# Patient Record
Sex: FEMALE | Race: WHITE | NOT HISPANIC OR LATINO | Employment: FULL TIME | URBAN - METROPOLITAN AREA
[De-identification: names, ages, dates, MRNs, and addresses within clinical notes are randomized per-mention and may not be internally consistent; named-entity substitution may affect disease eponyms.]

---

## 2018-05-22 ENCOUNTER — HOSPITAL ENCOUNTER (EMERGENCY)
Facility: HOSPITAL | Age: 58
Discharge: HOME | End: 2018-05-22
Attending: EMERGENCY MEDICINE | Admitting: EMERGENCY MEDICINE

## 2018-05-22 ENCOUNTER — APPOINTMENT (OUTPATIENT)
Dept: GENERAL RADIOLOGY | Facility: HOSPITAL | Age: 58
End: 2018-05-22

## 2018-05-22 VITALS
BODY MASS INDEX: 26.07 KG/M2 | OXYGEN SATURATION: 98 % | DIASTOLIC BLOOD PRESSURE: 70 MMHG | HEART RATE: 70 BPM | TEMPERATURE: 98.1 F | RESPIRATION RATE: 18 BRPM | SYSTOLIC BLOOD PRESSURE: 114 MMHG | HEIGHT: 68 IN | WEIGHT: 172 LBS

## 2018-05-22 DIAGNOSIS — M43.16 SPONDYLOLISTHESIS AT L4-L5 LEVEL: ICD-10-CM

## 2018-05-22 DIAGNOSIS — M85.88 OSTEOPENIA OF SPINE: ICD-10-CM

## 2018-05-22 DIAGNOSIS — M54.50 ACUTE BILATERAL LOW BACK PAIN WITHOUT SCIATICA: Primary | ICD-10-CM

## 2018-05-22 PROCEDURE — 63710000001 PREDNISONE PER 1 MG: Performed by: EMERGENCY MEDICINE

## 2018-05-22 PROCEDURE — 25010000002 HYDROMORPHONE PER 4 MG: Performed by: EMERGENCY MEDICINE

## 2018-05-22 PROCEDURE — 96372 THER/PROPH/DIAG INJ SC/IM: CPT

## 2018-05-22 PROCEDURE — 25010000002 ONDANSETRON PER 1 MG

## 2018-05-22 PROCEDURE — 96374 THER/PROPH/DIAG INJ IV PUSH: CPT

## 2018-05-22 PROCEDURE — 99284 EMERGENCY DEPT VISIT MOD MDM: CPT

## 2018-05-22 PROCEDURE — 72100 X-RAY EXAM L-S SPINE 2/3 VWS: CPT

## 2018-05-22 PROCEDURE — 25010000002 PROMETHAZINE PER 50 MG: Performed by: EMERGENCY MEDICINE

## 2018-05-22 RX ORDER — PREDNISONE 20 MG/1
40 TABLET ORAL ONCE
Status: COMPLETED | OUTPATIENT
Start: 2018-05-22 | End: 2018-05-22

## 2018-05-22 RX ORDER — PREDNISONE 20 MG/1
TABLET ORAL
Qty: 10 TABLET | Refills: 0 | Status: ON HOLD | OUTPATIENT
Start: 2018-05-22 | End: 2018-10-02

## 2018-05-22 RX ORDER — ONDANSETRON 2 MG/ML
4 INJECTION INTRAMUSCULAR; INTRAVENOUS ONCE
Status: COMPLETED | OUTPATIENT
Start: 2018-05-22 | End: 2018-05-22

## 2018-05-22 RX ORDER — METAXALONE 800 MG/1
800 TABLET ORAL 3 TIMES DAILY PRN
Qty: 15 TABLET | Refills: 0 | Status: ON HOLD | OUTPATIENT
Start: 2018-05-22 | End: 2018-10-02

## 2018-05-22 RX ORDER — PROMETHAZINE HYDROCHLORIDE 25 MG/ML
12.5 INJECTION, SOLUTION INTRAMUSCULAR; INTRAVENOUS ONCE
Status: COMPLETED | OUTPATIENT
Start: 2018-05-22 | End: 2018-05-22

## 2018-05-22 RX ORDER — ONDANSETRON 2 MG/ML
INJECTION INTRAMUSCULAR; INTRAVENOUS
Status: COMPLETED
Start: 2018-05-22 | End: 2018-05-22

## 2018-05-22 RX ORDER — HYDROMORPHONE HYDROCHLORIDE 1 MG/ML
2 INJECTION, SOLUTION INTRAMUSCULAR; INTRAVENOUS; SUBCUTANEOUS ONCE
Status: COMPLETED | OUTPATIENT
Start: 2018-05-22 | End: 2018-05-22

## 2018-05-22 RX ORDER — HYDROCODONE BITARTRATE AND ACETAMINOPHEN 7.5; 325 MG/1; MG/1
1 TABLET ORAL EVERY 4 HOURS PRN
Qty: 12 TABLET | Refills: 0 | Status: ON HOLD | OUTPATIENT
Start: 2018-05-22 | End: 2018-10-03

## 2018-05-22 RX ADMIN — PREDNISONE 40 MG: 20 TABLET ORAL at 17:34

## 2018-05-22 RX ADMIN — ONDANSETRON 4 MG: 2 INJECTION INTRAMUSCULAR; INTRAVENOUS at 20:38

## 2018-05-22 RX ADMIN — PROMETHAZINE HYDROCHLORIDE 12.5 MG: 25 INJECTION INTRAMUSCULAR; INTRAVENOUS at 17:33

## 2018-05-22 RX ADMIN — HYDROMORPHONE HYDROCHLORIDE 2 MG: 1 INJECTION, SOLUTION INTRAMUSCULAR; INTRAVENOUS; SUBCUTANEOUS at 17:33

## 2018-05-22 NOTE — DISCHARGE INSTRUCTIONS
Return if difficulty controlling your bowels or bladder or any other concerns.  Don't drive while taking the muscle relaxer or the pain medicine that I have prescribed, they will make you sleepy.  Remain as active as you can

## 2018-05-22 NOTE — ED PROVIDER NOTES
Subjective   Carolyn Chaparro is a 57 y.o.female who presents to the emergency department with complaints of back pain. The patient has a history of degenerative disc disease and arthritis in the right hip. She has chronic pain in the right lower back at baseline but tells us that this progressively increased in severity last night with radiation across the lower back to the left side which is new and different from baseline. At times the pain has been so severe that it made her cry out. The patient says that her back pain usually goes away when lying down. She has tried lying down and even sitting but states that this has not provided any relief. Sitting down actually causes her to have sharp pains radiating down the left posterior upper leg. She took 800 mg of Ibuprofen at 1500 today prior to arrival with no improvement and acme here for continued symptoms. She denies numbness, tingling, incontinence of urine or stool, or any other acute complaints at this time.        History provided by:  Patient  Back Pain   Location:  Lumbar spine  Quality:  Aching  Radiates to:  L posterior upper leg  Pain severity:  Severe  Pain is:  Same all the time  Onset quality:  Gradual  Duration:  1 day  Timing:  Constant  Progression:  Worsening  Chronicity:  Chronic  Context: physical stress    Relieved by:  Nothing  Worsened by:  Lying down (sitting)  Ineffective treatments:  Ibuprofen  Associated symptoms: leg pain    Associated symptoms: no bladder incontinence, no bowel incontinence, no numbness, no paresthesias, no perianal numbness, no tingling and no weakness        Review of Systems   Gastrointestinal: Negative for bowel incontinence.   Genitourinary: Negative for bladder incontinence.   Musculoskeletal: Positive for back pain.   Neurological: Negative for tingling, weakness, numbness and paresthesias.   All other systems reviewed and are negative.      Past Medical History:   Diagnosis Date   • Anxiety    • Chronic  constipation    • Heart palpitations    • Hypertension    • Post-menopause on HRT (hormone replacement therapy)        No Known Allergies    Past Surgical History:   Procedure Laterality Date   • LAPAROSCOPIC ASSISTED VAGINAL HYSTERECTOMY SALPINGO OOPHORECTOMY Bilateral    • LAPAROSCOPY WITH LASER     • PERINEOPLASTY     • POSTERIOR REPAIR         History reviewed. No pertinent family history.    Social History     Social History   • Marital status:      Social History Main Topics   • Smoking status: Never Smoker   • Alcohol use No   • Drug use: No   • Sexual activity: Yes     Partners: Male     Birth control/ protection: Surgical, Post-menopausal     Other Topics Concern   • Not on file         Objective   Physical Exam   Constitutional: She is oriented to person, place, and time. She appears well-developed and well-nourished. No distress.   HENT:   Head: Normocephalic and atraumatic.   Eyes: Conjunctivae are normal. No scleral icterus.   Neck: Normal range of motion. Neck supple.   Cardiovascular: Normal rate, regular rhythm and normal heart sounds.    No murmur heard.  Pulmonary/Chest: Effort normal and breath sounds normal. No respiratory distress.   Abdominal: Soft. Bowel sounds are normal. There is no tenderness. There is no rebound and no guarding.   Musculoskeletal: She exhibits tenderness. She exhibits no edema.   She is standing. Diffuse tenderness to palpation of the lower back.   Neurological: She is alert and oriented to person, place, and time.   Skin: Skin is warm and dry. No erythema.   Psychiatric: She has a normal mood and affect. Her behavior is normal.   Nursing note and vitals reviewed.      Procedures         ED Course  ED Course as of May 24 2044   Tue May 22, 2018   1716 At this point she is standing unable to sit and will not be able to get x-rays.  She has occasional spasms of pain.  I will give her an injection of pain medicine and hopefully at that point will be able to get x-rays   [DT]   1919 Long talk with Mrs. Chaparro and her  about her osteopenia as well as the spondylolisthesis.  I talked to them about use of narcotic pain medications.  I talked to her about the need for follow-up and will refer her to follow-up with Dr. Kaur who is on-call for neurosurgery she will also continue to follow with her primary care provider  [DT]      ED Course User Index  [DT] Rashaad Mota MD                     MDM  Number of Diagnoses or Management Options  Acute bilateral low back pain without sciatica: new and requires workup  Osteopenia of spine:   Spondylolisthesis at L4-L5 level:      Amount and/or Complexity of Data Reviewed  Tests in the radiology section of CPT®: ordered and reviewed    Patient Progress  Patient progress: improved      Final diagnoses:   Acute bilateral low back pain without sciatica   Spondylolisthesis at L4-L5 level   Osteopenia of spine       Documentation assistance provided by gamaliel Dodson.  Information recorded by the scribe was done at my direction and has been verified and validated by me.     Luz Dodson  05/22/18 [id number]       Rashaad Mota MD  05/24/18 [id number]

## 2018-10-01 ENCOUNTER — HOSPITAL ENCOUNTER (INPATIENT)
Facility: HOSPITAL | Age: 58
LOS: 3 days | Discharge: HOME | End: 2018-10-05
Attending: EMERGENCY MEDICINE | Admitting: INTERNAL MEDICINE

## 2018-10-01 ENCOUNTER — APPOINTMENT (OUTPATIENT)
Dept: GENERAL RADIOLOGY | Facility: HOSPITAL | Age: 58
End: 2018-10-01

## 2018-10-01 DIAGNOSIS — R77.8 ELEVATED TROPONIN: ICD-10-CM

## 2018-10-01 DIAGNOSIS — R07.2 PRECORDIAL CHEST PAIN: Primary | ICD-10-CM

## 2018-10-01 DIAGNOSIS — I21.4 NSTEMI (NON-ST ELEVATED MYOCARDIAL INFARCTION) (HCC): ICD-10-CM

## 2018-10-01 LAB
ALBUMIN SERPL-MCNC: 4.15 G/DL (ref 3.2–4.8)
ALBUMIN/GLOB SERPL: 1.8 G/DL (ref 1.5–2.5)
ALP SERPL-CCNC: 75 U/L (ref 25–100)
ALT SERPL W P-5'-P-CCNC: 34 U/L (ref 7–40)
ANION GAP SERPL CALCULATED.3IONS-SCNC: 6 MMOL/L (ref 3–11)
AST SERPL-CCNC: 34 U/L (ref 0–33)
BASOPHILS # BLD AUTO: 0.03 10*3/MM3 (ref 0–0.2)
BASOPHILS NFR BLD AUTO: 0.6 % (ref 0–1)
BILIRUB SERPL-MCNC: 0.4 MG/DL (ref 0.3–1.2)
BNP SERPL-MCNC: 34 PG/ML (ref 0–100)
BUN BLD-MCNC: 12 MG/DL (ref 9–23)
BUN/CREAT SERPL: 14.5 (ref 7–25)
CALCIUM SPEC-SCNC: 9 MG/DL (ref 8.7–10.4)
CHLORIDE SERPL-SCNC: 100 MMOL/L (ref 99–109)
CO2 SERPL-SCNC: 31 MMOL/L (ref 20–31)
CREAT BLD-MCNC: 0.83 MG/DL (ref 0.6–1.3)
DEPRECATED RDW RBC AUTO: 40.4 FL (ref 37–54)
EOSINOPHIL # BLD AUTO: 0.29 10*3/MM3 (ref 0–0.3)
EOSINOPHIL NFR BLD AUTO: 5.9 % (ref 0–3)
ERYTHROCYTE [DISTWIDTH] IN BLOOD BY AUTOMATED COUNT: 12.5 % (ref 11.3–14.5)
GFR SERPL CREATININE-BSD FRML MDRD: 71 ML/MIN/1.73
GLOBULIN UR ELPH-MCNC: 2.3 GM/DL
GLUCOSE BLD-MCNC: 129 MG/DL (ref 70–100)
HCT VFR BLD AUTO: 39.9 % (ref 34.5–44)
HGB BLD-MCNC: 13.7 G/DL (ref 11.5–15.5)
HOLD SPECIMEN: NORMAL
HOLD SPECIMEN: NORMAL
IMM GRANULOCYTES # BLD: 0.01 10*3/MM3 (ref 0–0.03)
IMM GRANULOCYTES NFR BLD: 0.2 % (ref 0–0.6)
LIPASE SERPL-CCNC: 38 U/L (ref 6–51)
LYMPHOCYTES # BLD AUTO: 1.72 10*3/MM3 (ref 0.6–4.8)
LYMPHOCYTES NFR BLD AUTO: 35.1 % (ref 24–44)
MCH RBC QN AUTO: 30.9 PG (ref 27–31)
MCHC RBC AUTO-ENTMCNC: 34.3 G/DL (ref 32–36)
MCV RBC AUTO: 89.9 FL (ref 80–99)
MONOCYTES # BLD AUTO: 0.49 10*3/MM3 (ref 0–1)
MONOCYTES NFR BLD AUTO: 10 % (ref 0–12)
NEUTROPHILS # BLD AUTO: 2.37 10*3/MM3 (ref 1.5–8.3)
NEUTROPHILS NFR BLD AUTO: 48.4 % (ref 41–71)
PLATELET # BLD AUTO: 245 10*3/MM3 (ref 150–450)
PMV BLD AUTO: 10.1 FL (ref 6–12)
POTASSIUM BLD-SCNC: 4.1 MMOL/L (ref 3.5–5.5)
PROT SERPL-MCNC: 6.4 G/DL (ref 5.7–8.2)
RBC # BLD AUTO: 4.44 10*6/MM3 (ref 3.89–5.14)
SODIUM BLD-SCNC: 137 MMOL/L (ref 132–146)
TROPONIN I SERPL-MCNC: 0 NG/ML (ref 0–0.07)
WBC NRBC COR # BLD: 4.9 10*3/MM3 (ref 3.5–10.8)
WHOLE BLOOD HOLD SPECIMEN: NORMAL
WHOLE BLOOD HOLD SPECIMEN: NORMAL

## 2018-10-01 PROCEDURE — 83880 ASSAY OF NATRIURETIC PEPTIDE: CPT | Performed by: EMERGENCY MEDICINE

## 2018-10-01 PROCEDURE — 99285 EMERGENCY DEPT VISIT HI MDM: CPT

## 2018-10-01 PROCEDURE — 85025 COMPLETE CBC W/AUTO DIFF WBC: CPT | Performed by: EMERGENCY MEDICINE

## 2018-10-01 PROCEDURE — 93005 ELECTROCARDIOGRAM TRACING: CPT | Performed by: EMERGENCY MEDICINE

## 2018-10-01 PROCEDURE — 93005 ELECTROCARDIOGRAM TRACING: CPT

## 2018-10-01 PROCEDURE — 71045 X-RAY EXAM CHEST 1 VIEW: CPT

## 2018-10-01 PROCEDURE — 83690 ASSAY OF LIPASE: CPT | Performed by: EMERGENCY MEDICINE

## 2018-10-01 PROCEDURE — 84484 ASSAY OF TROPONIN QUANT: CPT

## 2018-10-01 PROCEDURE — 80053 COMPREHEN METABOLIC PANEL: CPT | Performed by: EMERGENCY MEDICINE

## 2018-10-01 RX ORDER — NITROGLYCERIN 0.4 MG/1
0.4 TABLET SUBLINGUAL
Status: DISCONTINUED | OUTPATIENT
Start: 2018-10-01 | End: 2018-10-02

## 2018-10-01 RX ORDER — PANTOPRAZOLE SODIUM 40 MG/1
40 TABLET, DELAYED RELEASE ORAL DAILY
COMMUNITY

## 2018-10-01 RX ORDER — FLUOXETINE HYDROCHLORIDE 20 MG/1
20 CAPSULE ORAL NIGHTLY
COMMUNITY
End: 2021-03-22

## 2018-10-01 RX ORDER — AMITRIPTYLINE HYDROCHLORIDE 10 MG/1
10 TABLET, FILM COATED ORAL NIGHTLY
COMMUNITY
End: 2022-06-03

## 2018-10-01 RX ORDER — SODIUM CHLORIDE 0.9 % (FLUSH) 0.9 %
10 SYRINGE (ML) INJECTION AS NEEDED
Status: DISCONTINUED | OUTPATIENT
Start: 2018-10-01 | End: 2018-10-02

## 2018-10-01 RX ORDER — NAPROXEN SODIUM 220 MG
220 TABLET ORAL NIGHTLY
COMMUNITY
End: 2019-07-17

## 2018-10-01 RX ORDER — ASPIRIN 81 MG/1
324 TABLET, CHEWABLE ORAL ONCE
Status: COMPLETED | OUTPATIENT
Start: 2018-10-01 | End: 2018-10-01

## 2018-10-01 RX ADMIN — NITROGLYCERIN 0.4 MG: 0.4 TABLET SUBLINGUAL at 23:23

## 2018-10-01 RX ADMIN — ASPIRIN 81 MG 324 MG: 81 TABLET ORAL at 22:43

## 2018-10-02 ENCOUNTER — APPOINTMENT (OUTPATIENT)
Dept: CARDIOLOGY | Facility: HOSPITAL | Age: 58
End: 2018-10-02

## 2018-10-02 PROBLEM — I21.4 NSTEMI (NON-ST ELEVATED MYOCARDIAL INFARCTION) (HCC): Status: ACTIVE | Noted: 2018-10-02

## 2018-10-02 PROBLEM — K21.9 GERD (GASTROESOPHAGEAL REFLUX DISEASE): Status: ACTIVE | Noted: 2018-10-02

## 2018-10-02 PROBLEM — R07.2 PRECORDIAL CHEST PAIN: Status: ACTIVE | Noted: 2018-10-02

## 2018-10-02 LAB
ACT BLD: 279 SECONDS (ref 82–152)
ACT BLD: 323 SECONDS (ref 82–152)
ANION GAP SERPL CALCULATED.3IONS-SCNC: 5 MMOL/L (ref 3–11)
ARTICHOKE IGE QN: 104 MG/DL (ref 0–130)
BH CV ECHO MEAS - AO ROOT AREA (BSA CORRECTED): 1.5
BH CV ECHO MEAS - AO ROOT AREA: 6 CM^2
BH CV ECHO MEAS - AO ROOT DIAM: 2.8 CM
BH CV ECHO MEAS - BSA(HAYCOCK): 1.9 M^2
BH CV ECHO MEAS - BSA: 1.9 M^2
BH CV ECHO MEAS - BZI_BMI: 25.4 KILOGRAMS/M^2
BH CV ECHO MEAS - BZI_METRIC_HEIGHT: 172.7 CM
BH CV ECHO MEAS - BZI_METRIC_WEIGHT: 75.8 KG
BH CV ECHO MEAS - EDV(CUBED): 147.6 ML
BH CV ECHO MEAS - EDV(TEICH): 134.4 ML
BH CV ECHO MEAS - EF(CUBED): 76.8 %
BH CV ECHO MEAS - EF(TEICH): 68.4 %
BH CV ECHO MEAS - ESV(CUBED): 34.3 ML
BH CV ECHO MEAS - ESV(TEICH): 42.5 ML
BH CV ECHO MEAS - FS: 38.5 %
BH CV ECHO MEAS - IVS/LVPW: 0.98
BH CV ECHO MEAS - IVSD: 1 CM
BH CV ECHO MEAS - LA DIMENSION: 4 CM
BH CV ECHO MEAS - LA/AO: 1.5
BH CV ECHO MEAS - LAD MAJOR: 4.7 CM
BH CV ECHO MEAS - LAT PEAK E' VEL: 6.1 CM/SEC
BH CV ECHO MEAS - LATERAL E/E' RATIO: 8.4
BH CV ECHO MEAS - LV MASS(C)D: 213 GRAMS
BH CV ECHO MEAS - LV MASS(C)DI: 112.5 GRAMS/M^2
BH CV ECHO MEAS - LVIDD: 5.3 CM
BH CV ECHO MEAS - LVIDS: 3.2 CM
BH CV ECHO MEAS - LVPWD: 1.1 CM
BH CV ECHO MEAS - MED PEAK E' VEL: 4.6 CM/SEC
BH CV ECHO MEAS - MEDIAL E/E' RATIO: 11
BH CV ECHO MEAS - MV A MAX VEL: 78.5 CM/SEC
BH CV ECHO MEAS - MV DEC SLOPE: 207.3 CM/SEC^2
BH CV ECHO MEAS - MV DEC TIME: 0.41 SEC
BH CV ECHO MEAS - MV E MAX VEL: 52.3 CM/SEC
BH CV ECHO MEAS - MV E/A: 0.67
BH CV ECHO MEAS - MV P1/2T MAX VEL: 70.6 CM/SEC
BH CV ECHO MEAS - MV P1/2T: 99.7 MSEC
BH CV ECHO MEAS - MVA P1/2T LCG: 3.1 CM^2
BH CV ECHO MEAS - MVA(P1/2T): 2.2 CM^2
BH CV ECHO MEAS - PA ACC SLOPE: 387.9 CM/SEC^2
BH CV ECHO MEAS - PA ACC TIME: 0.15 SEC
BH CV ECHO MEAS - PA PR(ACCEL): 10.9 MMHG
BH CV ECHO MEAS - RV MAX PG: 1.5 MMHG
BH CV ECHO MEAS - RV V1 MAX: 61.2 CM/SEC
BH CV ECHO MEAS - SI(CUBED): 59.8 ML/M^2
BH CV ECHO MEAS - SI(TEICH): 48.6 ML/M^2
BH CV ECHO MEAS - SV(CUBED): 113.3 ML
BH CV ECHO MEAS - SV(TEICH): 91.9 ML
BH CV ECHO MEASUREMENTS AVERAGE E/E' RATIO: 9.78
BH CV XLRA - RV BASE: 2.7 CM
BH CV XLRA - RV LENGTH: 6.9 CM
BH CV XLRA - RV MID: 2.4 CM
BH CV XLRA - TDI S': 10.8 CM/SEC
BUN BLD-MCNC: 14 MG/DL (ref 9–23)
BUN/CREAT SERPL: 21.5 (ref 7–25)
CALCIUM SPEC-SCNC: 8.9 MG/DL (ref 8.7–10.4)
CHLORIDE SERPL-SCNC: 99 MMOL/L (ref 99–109)
CHOLEST SERPL-MCNC: 153 MG/DL (ref 0–200)
CO2 SERPL-SCNC: 29 MMOL/L (ref 20–31)
CREAT BLD-MCNC: 0.65 MG/DL (ref 0.6–1.3)
DEPRECATED RDW RBC AUTO: 39.6 FL (ref 37–54)
ERYTHROCYTE [DISTWIDTH] IN BLOOD BY AUTOMATED COUNT: 12.3 % (ref 11.3–14.5)
GFR SERPL CREATININE-BSD FRML MDRD: 94 ML/MIN/1.73
GLUCOSE BLD-MCNC: 108 MG/DL (ref 70–100)
HBA1C MFR BLD: 5.1 % (ref 4.8–5.6)
HCT VFR BLD AUTO: 36.9 % (ref 34.5–44)
HDLC SERPL-MCNC: 41 MG/DL (ref 40–60)
HGB BLD-MCNC: 12.6 G/DL (ref 11.5–15.5)
LEFT ATRIUM VOLUME INDEX: 22.7 ML/M^2
LEFT ATRIUM VOLUME: 43 CM3
LV EF 2D ECHO EST: 60 %
MCH RBC QN AUTO: 30.4 PG (ref 27–31)
MCHC RBC AUTO-ENTMCNC: 34.1 G/DL (ref 32–36)
MCV RBC AUTO: 88.9 FL (ref 80–99)
PLATELET # BLD AUTO: 243 10*3/MM3 (ref 150–450)
PMV BLD AUTO: 10.3 FL (ref 6–12)
POTASSIUM BLD-SCNC: 4.5 MMOL/L (ref 3.5–5.5)
RBC # BLD AUTO: 4.15 10*6/MM3 (ref 3.89–5.14)
SODIUM BLD-SCNC: 133 MMOL/L (ref 132–146)
TRIGL SERPL-MCNC: 126 MG/DL (ref 0–150)
TROPONIN I SERPL-MCNC: 1.02 NG/ML (ref 0–0.07)
TROPONIN I SERPL-MCNC: 9.86 NG/ML
WBC NRBC COR # BLD: 7.11 10*3/MM3 (ref 3.5–10.8)

## 2018-10-02 PROCEDURE — C1769 GUIDE WIRE: HCPCS | Performed by: INTERNAL MEDICINE

## 2018-10-02 PROCEDURE — 85347 COAGULATION TIME ACTIVATED: CPT

## 2018-10-02 PROCEDURE — 93306 TTE W/DOPPLER COMPLETE: CPT

## 2018-10-02 PROCEDURE — 84484 ASSAY OF TROPONIN QUANT: CPT

## 2018-10-02 PROCEDURE — 93306 TTE W/DOPPLER COMPLETE: CPT | Performed by: INTERNAL MEDICINE

## 2018-10-02 PROCEDURE — C1894 INTRO/SHEATH, NON-LASER: HCPCS | Performed by: INTERNAL MEDICINE

## 2018-10-02 PROCEDURE — 25010000002 FENTANYL CITRATE (PF) 100 MCG/2ML SOLUTION: Performed by: INTERNAL MEDICINE

## 2018-10-02 PROCEDURE — 25010000002 ENOXAPARIN PER 10 MG: Performed by: EMERGENCY MEDICINE

## 2018-10-02 PROCEDURE — 25010000002 HEPARIN (PORCINE) PER 1000 UNITS: Performed by: INTERNAL MEDICINE

## 2018-10-02 PROCEDURE — 25010000002 EPTIFIBATIDE 20 MG/10ML SOLUTION: Performed by: INTERNAL MEDICINE

## 2018-10-02 PROCEDURE — 84484 ASSAY OF TROPONIN QUANT: CPT | Performed by: NURSE PRACTITIONER

## 2018-10-02 PROCEDURE — 93005 ELECTROCARDIOGRAM TRACING: CPT | Performed by: INTERNAL MEDICINE

## 2018-10-02 PROCEDURE — 25010000002 ONDANSETRON PER 1 MG: Performed by: INTERNAL MEDICINE

## 2018-10-02 PROCEDURE — 25010000002 EPTIFIBATIDE PER 5 MG: Performed by: INTERNAL MEDICINE

## 2018-10-02 PROCEDURE — 25010000002 MIDAZOLAM PER 1 MG: Performed by: INTERNAL MEDICINE

## 2018-10-02 PROCEDURE — 0 IOPAMIDOL PER 1 ML: Performed by: INTERNAL MEDICINE

## 2018-10-02 PROCEDURE — 85027 COMPLETE CBC AUTOMATED: CPT | Performed by: NURSE PRACTITIONER

## 2018-10-02 PROCEDURE — 25010000002 MORPHINE PER 10 MG: Performed by: INTERNAL MEDICINE

## 2018-10-02 PROCEDURE — 4A023N7 MEASUREMENT OF CARDIAC SAMPLING AND PRESSURE, LEFT HEART, PERCUTANEOUS APPROACH: ICD-10-PCS | Performed by: INTERNAL MEDICINE

## 2018-10-02 PROCEDURE — 80061 LIPID PANEL: CPT | Performed by: NURSE PRACTITIONER

## 2018-10-02 PROCEDURE — 93010 ELECTROCARDIOGRAM REPORT: CPT | Performed by: INTERNAL MEDICINE

## 2018-10-02 PROCEDURE — 93005 ELECTROCARDIOGRAM TRACING: CPT | Performed by: NURSE PRACTITIONER

## 2018-10-02 PROCEDURE — 80048 BASIC METABOLIC PNL TOTAL CA: CPT | Performed by: NURSE PRACTITIONER

## 2018-10-02 PROCEDURE — 99223 1ST HOSP IP/OBS HIGH 75: CPT | Performed by: INTERNAL MEDICINE

## 2018-10-02 PROCEDURE — 93454 CORONARY ARTERY ANGIO S&I: CPT | Performed by: INTERNAL MEDICINE

## 2018-10-02 PROCEDURE — 99254 IP/OBS CNSLTJ NEW/EST MOD 60: CPT | Performed by: INTERNAL MEDICINE

## 2018-10-02 PROCEDURE — B2111ZZ FLUOROSCOPY OF MULTIPLE CORONARY ARTERIES USING LOW OSMOLAR CONTRAST: ICD-10-PCS | Performed by: INTERNAL MEDICINE

## 2018-10-02 PROCEDURE — B2151ZZ FLUOROSCOPY OF LEFT HEART USING LOW OSMOLAR CONTRAST: ICD-10-PCS | Performed by: INTERNAL MEDICINE

## 2018-10-02 PROCEDURE — C1887 CATHETER, GUIDING: HCPCS | Performed by: INTERNAL MEDICINE

## 2018-10-02 PROCEDURE — 83036 HEMOGLOBIN GLYCOSYLATED A1C: CPT | Performed by: NURSE PRACTITIONER

## 2018-10-02 RX ORDER — EPTIFIBATIDE 0.75 MG/ML
2 INJECTION, SOLUTION INTRAVENOUS CONTINUOUS
Status: DISCONTINUED | OUTPATIENT
Start: 2018-10-02 | End: 2018-10-05 | Stop reason: HOSPADM

## 2018-10-02 RX ORDER — HEPARIN SODIUM 1000 [USP'U]/ML
INJECTION, SOLUTION INTRAVENOUS; SUBCUTANEOUS AS NEEDED
Status: DISCONTINUED | OUTPATIENT
Start: 2018-10-02 | End: 2018-10-02 | Stop reason: HOSPADM

## 2018-10-02 RX ORDER — PANTOPRAZOLE SODIUM 40 MG/1
40 TABLET, DELAYED RELEASE ORAL
Status: DISCONTINUED | OUTPATIENT
Start: 2018-10-02 | End: 2018-10-05 | Stop reason: HOSPADM

## 2018-10-02 RX ORDER — ACETAMINOPHEN 325 MG/1
650 TABLET ORAL EVERY 4 HOURS PRN
Status: DISCONTINUED | OUTPATIENT
Start: 2018-10-02 | End: 2018-10-02 | Stop reason: SDUPTHER

## 2018-10-02 RX ORDER — MORPHINE SULFATE 2 MG/ML
1 INJECTION, SOLUTION INTRAMUSCULAR; INTRAVENOUS EVERY 4 HOURS PRN
Status: DISCONTINUED | OUTPATIENT
Start: 2018-10-02 | End: 2018-10-05 | Stop reason: HOSPADM

## 2018-10-02 RX ORDER — METOPROLOL SUCCINATE 50 MG/1
50 TABLET, EXTENDED RELEASE ORAL EVERY EVENING
Status: DISCONTINUED | OUTPATIENT
Start: 2018-10-02 | End: 2018-10-05 | Stop reason: HOSPADM

## 2018-10-02 RX ORDER — CLOPIDOGREL BISULFATE 75 MG/1
300 TABLET ORAL ONCE
Status: COMPLETED | OUTPATIENT
Start: 2018-10-02 | End: 2018-10-02

## 2018-10-02 RX ORDER — CLOPIDOGREL BISULFATE 75 MG/1
75 TABLET ORAL DAILY
Status: DISCONTINUED | OUTPATIENT
Start: 2018-10-03 | End: 2018-10-05 | Stop reason: HOSPADM

## 2018-10-02 RX ORDER — ACETAMINOPHEN 325 MG/1
650 TABLET ORAL EVERY 4 HOURS PRN
Status: DISCONTINUED | OUTPATIENT
Start: 2018-10-02 | End: 2018-10-05 | Stop reason: HOSPADM

## 2018-10-02 RX ORDER — CLOPIDOGREL BISULFATE 75 MG/1
75 TABLET ORAL DAILY
Status: DISCONTINUED | OUTPATIENT
Start: 2018-10-03 | End: 2018-10-02

## 2018-10-02 RX ORDER — FENTANYL CITRATE 50 UG/ML
INJECTION, SOLUTION INTRAMUSCULAR; INTRAVENOUS AS NEEDED
Status: DISCONTINUED | OUTPATIENT
Start: 2018-10-02 | End: 2018-10-02 | Stop reason: HOSPADM

## 2018-10-02 RX ORDER — AMITRIPTYLINE HYDROCHLORIDE 10 MG/1
10 TABLET, FILM COATED ORAL NIGHTLY
Status: DISCONTINUED | OUTPATIENT
Start: 2018-10-02 | End: 2018-10-05 | Stop reason: HOSPADM

## 2018-10-02 RX ORDER — ONDANSETRON 2 MG/ML
4 INJECTION INTRAMUSCULAR; INTRAVENOUS EVERY 6 HOURS PRN
Status: DISCONTINUED | OUTPATIENT
Start: 2018-10-02 | End: 2018-10-05 | Stop reason: HOSPADM

## 2018-10-02 RX ORDER — ONDANSETRON 4 MG/1
4 TABLET, FILM COATED ORAL EVERY 6 HOURS PRN
Status: DISCONTINUED | OUTPATIENT
Start: 2018-10-02 | End: 2018-10-05 | Stop reason: HOSPADM

## 2018-10-02 RX ORDER — EPTIFIBATIDE 0.75 MG/ML
INJECTION, SOLUTION INTRAVENOUS CONTINUOUS PRN
Status: COMPLETED | OUTPATIENT
Start: 2018-10-02 | End: 2018-10-02

## 2018-10-02 RX ORDER — ONDANSETRON 2 MG/ML
INJECTION INTRAMUSCULAR; INTRAVENOUS AS NEEDED
Status: DISCONTINUED | OUTPATIENT
Start: 2018-10-02 | End: 2018-10-02 | Stop reason: HOSPADM

## 2018-10-02 RX ORDER — HYDROCODONE BITARTRATE AND ACETAMINOPHEN 7.5; 325 MG/1; MG/1
1 TABLET ORAL EVERY 4 HOURS PRN
Status: DISCONTINUED | OUTPATIENT
Start: 2018-10-02 | End: 2018-10-05 | Stop reason: HOSPADM

## 2018-10-02 RX ORDER — METOPROLOL SUCCINATE 50 MG/1
50 TABLET, EXTENDED RELEASE ORAL DAILY
Status: DISCONTINUED | OUTPATIENT
Start: 2018-10-02 | End: 2018-10-02

## 2018-10-02 RX ORDER — SODIUM CHLORIDE 0.9 % (FLUSH) 0.9 %
1-10 SYRINGE (ML) INJECTION AS NEEDED
Status: DISCONTINUED | OUTPATIENT
Start: 2018-10-02 | End: 2018-10-05 | Stop reason: HOSPADM

## 2018-10-02 RX ORDER — BISACODYL 10 MG
10 SUPPOSITORY, RECTAL RECTAL DAILY PRN
Status: DISCONTINUED | OUTPATIENT
Start: 2018-10-02 | End: 2018-10-05 | Stop reason: HOSPADM

## 2018-10-02 RX ORDER — ONDANSETRON 2 MG/ML
4 INJECTION INTRAMUSCULAR; INTRAVENOUS EVERY 6 HOURS PRN
Status: DISCONTINUED | OUTPATIENT
Start: 2018-10-02 | End: 2018-10-02 | Stop reason: SDUPTHER

## 2018-10-02 RX ORDER — LIDOCAINE HYDROCHLORIDE 10 MG/ML
INJECTION, SOLUTION EPIDURAL; INFILTRATION; INTRACAUDAL; PERINEURAL AS NEEDED
Status: DISCONTINUED | OUTPATIENT
Start: 2018-10-02 | End: 2018-10-02 | Stop reason: HOSPADM

## 2018-10-02 RX ORDER — CLOPIDOGREL BISULFATE 75 MG/1
600 TABLET ORAL ONCE
Status: DISCONTINUED | OUTPATIENT
Start: 2018-10-02 | End: 2018-10-02

## 2018-10-02 RX ORDER — ATORVASTATIN CALCIUM 40 MG/1
80 TABLET, FILM COATED ORAL NIGHTLY
Status: DISCONTINUED | OUTPATIENT
Start: 2018-10-02 | End: 2018-10-02

## 2018-10-02 RX ORDER — SODIUM CHLORIDE 9 MG/ML
75 INJECTION, SOLUTION INTRAVENOUS CONTINUOUS
Status: ACTIVE | OUTPATIENT
Start: 2018-10-02 | End: 2018-10-02

## 2018-10-02 RX ORDER — ASPIRIN 325 MG
325 TABLET, DELAYED RELEASE (ENTERIC COATED) ORAL DAILY
Status: DISCONTINUED | OUTPATIENT
Start: 2018-10-03 | End: 2018-10-05

## 2018-10-02 RX ORDER — ASPIRIN 81 MG/1
81 TABLET ORAL DAILY
Status: DISCONTINUED | OUTPATIENT
Start: 2018-10-02 | End: 2018-10-02 | Stop reason: SDUPTHER

## 2018-10-02 RX ORDER — SODIUM CHLORIDE 9 MG/ML
1-3 INJECTION, SOLUTION INTRAVENOUS CONTINUOUS
Status: DISCONTINUED | OUTPATIENT
Start: 2018-10-02 | End: 2018-10-05 | Stop reason: HOSPADM

## 2018-10-02 RX ORDER — ATORVASTATIN CALCIUM 10 MG/1
10 TABLET, FILM COATED ORAL NIGHTLY
Status: DISCONTINUED | OUTPATIENT
Start: 2018-10-02 | End: 2018-10-03

## 2018-10-02 RX ORDER — ALPRAZOLAM 0.25 MG/1
0.25 TABLET ORAL 3 TIMES DAILY PRN
Status: DISCONTINUED | OUTPATIENT
Start: 2018-10-02 | End: 2018-10-05 | Stop reason: HOSPADM

## 2018-10-02 RX ORDER — SODIUM CHLORIDE 0.9 % (FLUSH) 0.9 %
3 SYRINGE (ML) INJECTION EVERY 12 HOURS SCHEDULED
Status: DISCONTINUED | OUTPATIENT
Start: 2018-10-02 | End: 2018-10-05 | Stop reason: HOSPADM

## 2018-10-02 RX ORDER — FLUOXETINE HYDROCHLORIDE 20 MG/1
20 CAPSULE ORAL DAILY
Status: DISCONTINUED | OUTPATIENT
Start: 2018-10-02 | End: 2018-10-02

## 2018-10-02 RX ORDER — FLUOXETINE HYDROCHLORIDE 20 MG/1
20 CAPSULE ORAL EVERY EVENING
Status: DISCONTINUED | OUTPATIENT
Start: 2018-10-02 | End: 2018-10-05 | Stop reason: HOSPADM

## 2018-10-02 RX ORDER — SODIUM CHLORIDE 9 MG/ML
100 INJECTION, SOLUTION INTRAVENOUS CONTINUOUS
Status: ACTIVE | OUTPATIENT
Start: 2018-10-02 | End: 2018-10-02

## 2018-10-02 RX ORDER — ASPIRIN 325 MG
325 TABLET ORAL ONCE
Status: DISCONTINUED | OUTPATIENT
Start: 2018-10-02 | End: 2018-10-02 | Stop reason: SDUPTHER

## 2018-10-02 RX ORDER — NICARDIPINE HCL-0.9% SOD CHLOR 1 MG/10 ML
SYRINGE (ML) INTRAVENOUS AS NEEDED
Status: DISCONTINUED | OUTPATIENT
Start: 2018-10-02 | End: 2018-10-02 | Stop reason: HOSPADM

## 2018-10-02 RX ORDER — EPTIFIBATIDE 2 MG/ML
INJECTION, SOLUTION INTRAVENOUS AS NEEDED
Status: DISCONTINUED | OUTPATIENT
Start: 2018-10-02 | End: 2018-10-02 | Stop reason: HOSPADM

## 2018-10-02 RX ORDER — NITROGLYCERIN 0.4 MG/1
0.4 TABLET SUBLINGUAL
Status: DISCONTINUED | OUTPATIENT
Start: 2018-10-02 | End: 2018-10-05 | Stop reason: HOSPADM

## 2018-10-02 RX ORDER — NALOXONE HCL 0.4 MG/ML
0.4 VIAL (ML) INJECTION
Status: DISCONTINUED | OUTPATIENT
Start: 2018-10-02 | End: 2018-10-05 | Stop reason: HOSPADM

## 2018-10-02 RX ORDER — ZOLPIDEM TARTRATE 5 MG/1
5 TABLET ORAL NIGHTLY PRN
Status: DISCONTINUED | OUTPATIENT
Start: 2018-10-02 | End: 2018-10-05 | Stop reason: HOSPADM

## 2018-10-02 RX ORDER — MIDAZOLAM HYDROCHLORIDE 1 MG/ML
INJECTION INTRAMUSCULAR; INTRAVENOUS AS NEEDED
Status: DISCONTINUED | OUTPATIENT
Start: 2018-10-02 | End: 2018-10-02 | Stop reason: HOSPADM

## 2018-10-02 RX ADMIN — METOPROLOL SUCCINATE 50 MG: 50 TABLET, EXTENDED RELEASE ORAL at 17:43

## 2018-10-02 RX ADMIN — ONDANSETRON 4 MG: 4 TABLET, FILM COATED ORAL at 06:48

## 2018-10-02 RX ADMIN — POLYETHYLENE GLYCOL (3350) 17 G: 17 POWDER, FOR SOLUTION ORAL at 21:31

## 2018-10-02 RX ADMIN — EPTIFIBATIDE 2 MCG/KG/MIN: 0.75 INJECTION, SOLUTION INTRAVENOUS at 23:14

## 2018-10-02 RX ADMIN — EPTIFIBATIDE 2 MCG/KG/MIN: 0.75 INJECTION, SOLUTION INTRAVENOUS at 17:43

## 2018-10-02 RX ADMIN — AMITRIPTYLINE HYDROCHLORIDE 10 MG: 10 TABLET, FILM COATED ORAL at 21:33

## 2018-10-02 RX ADMIN — NITROGLYCERIN 0.5 INCH: 20 OINTMENT TOPICAL at 00:30

## 2018-10-02 RX ADMIN — ATORVASTATIN CALCIUM 10 MG: 10 TABLET, FILM COATED ORAL at 21:32

## 2018-10-02 RX ADMIN — ASPIRIN 81 MG: 81 TABLET, COATED ORAL at 09:00

## 2018-10-02 RX ADMIN — ACETAMINOPHEN 650 MG: 325 TABLET, FILM COATED ORAL at 06:20

## 2018-10-02 RX ADMIN — FLUOXETINE HYDROCHLORIDE 20 MG: 20 CAPSULE ORAL at 17:43

## 2018-10-02 RX ADMIN — NICARDIPINE HYDROCHLORIDE 2 MG/HR: 0.1 INJECTION, SOLUTION INTRAVENOUS at 22:09

## 2018-10-02 RX ADMIN — ENOXAPARIN SODIUM 80 MG: 80 INJECTION SUBCUTANEOUS at 00:31

## 2018-10-02 RX ADMIN — CLOPIDOGREL BISULFATE 300 MG: 75 TABLET ORAL at 06:20

## 2018-10-02 RX ADMIN — SODIUM CHLORIDE 75 ML/HR: 9 INJECTION, SOLUTION INTRAVENOUS at 06:49

## 2018-10-02 RX ADMIN — NICARDIPINE HYDROCHLORIDE 2 MG/HR: 0.1 INJECTION, SOLUTION INTRAVENOUS at 13:38

## 2018-10-02 RX ADMIN — PANTOPRAZOLE SODIUM 40 MG: 40 TABLET, DELAYED RELEASE ORAL at 06:20

## 2018-10-02 RX ADMIN — ONDANSETRON 4 MG: 4 TABLET, FILM COATED ORAL at 17:42

## 2018-10-02 RX ADMIN — MORPHINE SULFATE 1 MG: 2 INJECTION, SOLUTION INTRAMUSCULAR; INTRAVENOUS at 12:40

## 2018-10-02 NOTE — PROGRESS NOTES
Discharge Planning Assessment  [facility]     Patient Name: Carolyn Chaparro  MRN: [id number]  Today's Date: 10/2/2018    Admit Date: 10/1/2018          Discharge Needs Assessment     Row Name 10/02/18 1035       Living Environment    Lives With spouse    Name(s) of Who Lives With Patient Daniel Chaparro spouse    Current Living Arrangements home/apartment/condo    Primary Care Provided by self    Family Caregiver if Needed spouse    Family Caregiver Names Daniel Chaparro    Quality of Family Relationships supportive    Able to Return to Prior Arrangements yes    Living Arrangement Comments Lives in [place] and goal is to return home       Transition Planning    Patient/Family Anticipates Transition to home with family    Transportation Anticipated family or friend will provide       Discharge Needs Assessment    Readmission Within the Last 30 Days no previous admission in last 30 days    Concerns to be Addressed no discharge needs identified    Equipment Currently Used at Home none    Anticipated Changes Related to Illness none    Equipment Needed After Discharge none            Discharge Plan     Row Name 10/02/18 1036       Plan    Plan Carolyn Chaparro lives in [place] with her spouse Daniel Chaparro. She is going to the Cath Lab at this time.  She was independent before being admitted to the hospital.  She does not use equipment or have home health needs at this time.  Casemanagement will follow Mrs. Chaparro for discharge planning needs.    Patient/Family in Agreement with Plan yes        Destination     No service coordination in this encounter.      Durable Medical Equipment     No service coordination in this encounter.      Dialysis/Infusion     No service coordination in this encounter.      Home Medical Care     No service coordination in this encounter.      Social Care     No service coordination in this encounter.                Demographic Summary     Row Name 10/02/18 1035        General Information    Admission Type inpatient            Functional Status     Row Name 10/02/18 1035       Functional Status    Usual Activity Tolerance good    Current Activity Tolerance moderate       Functional Status, IADL    Medications independent    Meal Preparation independent    Housekeeping independent    Laundry independent    Shopping independent            Psychosocial    No documentation.           Abuse/Neglect    No documentation.           Legal    No documentation.           Substance Abuse    No documentation.           Patient Forms    No documentation.         SELENE Contreras

## 2018-10-02 NOTE — PROGRESS NOTES
H&P reviewed. Patient unable to be seen this morning as she was already down in cath lab with cardiology. Will await results per cardiology. No further changes at this time.

## 2018-10-02 NOTE — PLAN OF CARE
Problem: Patient Care Overview  Goal: Plan of Care Review  Outcome: Ongoing (interventions implemented as appropriate)    Goal: Individualization and Mutuality  Outcome: Ongoing (interventions implemented as appropriate)    Goal: Discharge Needs Assessment  Outcome: Ongoing (interventions implemented as appropriate)    Goal: Interprofessional Rounds/Family Conf  Outcome: Ongoing (interventions implemented as appropriate)      Problem: Cardiac: ACS (Acute Coronary Syndrome) (Adult)  Goal: Signs and Symptoms of Listed Potential Problems Will be Absent, Minimized or Managed (Cardiac: ACS)  Outcome: Ongoing (interventions implemented as appropriate)

## 2018-10-02 NOTE — H&P
"    [facility] Medicine Services  HISTORY AND PHYSICAL    Patient Name: Carolyn Chaparro  : 1960  MRN: [id number]  Primary Care Physician: Kimberlee Pinto MD  Date of admission: 10/1/2018      Subjective   Subjective     Chief Complaint: Chest Pain    HPI:  Carolyn Chaparro is a 58 y.o. female with past medical history of anxiety, GERD, hypertension, heart palpitations  presented to [facility] emergency department this evening with complaints of chest pain.  Patient states that 9 p.m. She was getting ready for bed and developed midsternal chest pain and pressure.  Patient also reports that her arms became weak and describes the pain as constant and \"uncomfortable.\"  At its worst she rates it at a 6 out of 10, but currently rates it at a 0 out of 10. Patient denies any prior similar symptoms. Patient reports that her chest pain was relieved with the placement Nitropaste in the emergency arm.  While in the emergency department patient was found to have troponin was elevated at 1.02.  Patient admitted to the hospital medicine service for further evaluation and treatment.    Review of Systems   Constitutional: Negative for chills, diaphoresis, fatigue and fever.   Respiratory: Negative for cough, shortness of breath and wheezing.    Cardiovascular: Positive for chest pain. Negative for palpitations and leg swelling.   Gastrointestinal: Negative for abdominal pain, nausea and vomiting.   Genitourinary: Negative for dysuria, frequency and urgency.   Musculoskeletal: Negative for arthralgias and myalgias.   Skin: Negative for color change, pallor, rash and wound.   Neurological: Negative for dizziness, syncope, weakness, light-headedness and headaches.   Psychiatric/Behavioral: Negative for agitation and confusion.   All other systems reviewed and are negative.    Otherwise 10-system ROS reviewed and is negative except as mentioned in the HPI.    Personal History "     Past Medical History:   Diagnosis Date   • Anxiety    • Chronic constipation    • GERD (gastroesophageal reflux disease)    • Heart palpitations    • Hypertension    • Osteopenia    • Post-menopause on HRT (hormone replacement therapy)        Past Surgical History:   Procedure Laterality Date   • LAPAROSCOPIC ASSISTED VAGINAL HYSTERECTOMY SALPINGO OOPHORECTOMY Bilateral    • LAPAROSCOPY WITH LASER     • PERINEOPLASTY     • POSTERIOR REPAIR         Family History: family history is not on file.     Social History:  reports that she has never smoked. She has never used smokeless tobacco. She reports that she does not drink alcohol or use drugs.  Social History     Social History Narrative   • No narrative on file       Medications:  Available home medication information reviewed     No Known Allergies    Objective   Objective     Vital Signs:   Temp:  [97.7 °F (36.5 °C)] 97.7 °F (36.5 °C)  Heart Rate:  [47-65] 65  Resp:  [16] 16  BP: (125-162)/(62-90) 134/78        Physical Exam   Constitutional: She is oriented to person, place, and time. She appears well-developed and well-nourished.   HENT:   Head: Normocephalic and atraumatic.   Eyes: Pupils are equal, round, and reactive to light. EOM are normal. No scleral icterus.   Neck: Normal range of motion. Neck supple. No JVD present.   Cardiovascular: Normal rate, regular rhythm, normal heart sounds and intact distal pulses.  Exam reveals no gallop and no friction rub.    No murmur heard.  Pulmonary/Chest: Effort normal and breath sounds normal. No respiratory distress. She has no wheezes. She has no rales. She exhibits no tenderness.   Abdominal: Soft. Bowel sounds are normal. She exhibits no distension and no mass. There is no tenderness. There is no rebound and no guarding. No hernia.   Musculoskeletal: Normal range of motion. She exhibits no edema, tenderness or deformity.   Neurological: She is alert and oriented to person, place, and time.   Skin: Skin is warm  and dry. Capillary refill takes less than 2 seconds. No rash noted. No erythema. No pallor.   Psychiatric: Her speech is normal and behavior is normal. Judgment and thought content normal. Her mood appears anxious. Cognition and memory are normal.   Nursing note and vitals reviewed.    Results Reviewed:  I have personally reviewed current lab, radiology, and data and agree.      Results from last 7 days  Lab Units 10/01/18  2219   WBC 10*3/mm3 4.90   HEMOGLOBIN g/dL 13.7   HEMATOCRIT % 39.9   PLATELETS 10*3/mm3 245       Results from last 7 days  Lab Units 10/01/18  2219   SODIUM mmol/L 137   POTASSIUM mmol/L 4.1   CHLORIDE mmol/L 100   CO2 mmol/L 31.0   BUN mg/dL 12   CREATININE mg/dL 0.83   GLUCOSE mg/dL 129*   CALCIUM mg/dL 9.0   ALT (SGPT) U/L 34   AST (SGOT) U/L 34*     Estimated Creatinine Clearance: 83.8 mL/min (by C-G formula based on SCr of 0.83 mg/dL).  Brief Urine Lab Results     None        BNP   Date Value Ref Range Status   10/01/2018 34.0 0.0 - 100.0 pg/mL Final     Comment:     Results may be falsely decreased if patient taking Biotin.     Imaging Results (last 24 hours)     Procedure Component Value Units Date/Time    XR Chest 1 View [[id number]] Collected:  10/01/18 2134     Updated:  10/01/18 2314    Narrative:       EXAM:    XR Chest, 1 View    CLINICAL HISTORY:    58 years old, female; Pain; Chest pain; Additional info: Chest pain triage   protocol    TECHNIQUE:    Frontal view of the chest.    COMPARISON:    No relevant prior studies available.    FINDINGS:    Lungs:  No acute infiltrates.  No pneumonia or CHF.    Pleural space:  No pleural effusion or pneumothorax.    Heart:  Normal heart size.    Mediastinum:  No acute mediastinal abnormality visualized.    Bones/joints:  Minimal scoliotic curvature, positional versus fixed.  No   acute abnormality seen along the well-visualized osseous structures.    Vasculature:  Mild aortic tortuosity.      Impression:         No acute cardiopulmonary  disease demonstrated.    THIS DOCUMENT HAS BEEN ELECTRONICALLY SIGNED BY NICKI BONILLA MD             Assessment/Plan   Assessment / Plan     Hospital Problem List     * (Principal)NSTEMI (non-ST elevated myocardial infarction) (CMS/[facility])    Hypertension    GERD (gastroesophageal reflux disease)          Assessment & Plan:  - Admit to telemetry  - VS q4h  - Consult to cardiology   - NSTEMI  - Trend troponin  - NPO   - ASA, Lovenox and Nitro paste in ED   - Continue  - Will add Plavix and statin   - AM Labs  - AM ekg  - AM ECHO  - IVF  - Referral to cardiac rehab    DVT prophylaxis: Lovenox    CODE STATUS:    Code Status and Medical Interventions:   Ordered at: 10/02/18 0520     Level Of Support Discussed With:    Patient     Code Status:    CPR     Medical Interventions (Level of Support Prior to Arrest):    Full       Admission Status:  I believe this patient meets INPATIENT status due to the need for care which can only be reasonably provided in an hospital setting such as aggressive/expedited ancillary services and/or consultation services, the necessity for IV medications, close physician monitoring and/or the possible need for procedures.  In such, I feel patient’s risk for adverse outcomes and need for care warrant INPATIENT evaluation and predict the patient’s care encounter to likely last beyond 2 midnights.    Electronically signed by REE Fischer, 10/02/18, 3:46 AM.      Brief Attending Admission Attestation     I have seen and examined the patient, performing an independent face-to-face diagnostic evaluation with plan of care reviewed and developed with the advanced practice clinician (APC).      Brief Summary Statement/HPI:   Carolyn Chaparro is a 58 y.o. female with a PMH significant for GERD, anxiety who present to the ED d/t midsternal chest pain.  Pt reports sx onset at 9 pm. While getting ready for bed.  He pain Non-radiating, no associated diaphoresis, nausea, presyncope,  palpitations.  She says the pain felt similar to an esophageal spasm which typically will resolve after drinking water.  Her pain persisted and she came to the ED for further evaluation.    Attending Physical Exam:  Constitutional: No acute distress, awake, alert  Eyes: PERRLA, sclerae anicteric, no conjunctival injection  HENT: NCAT, mucous membranes moist  Neck: Supple, no thyromegaly, no lymphadenopathy, trachea midline  Respiratory: Clear to auscultation bilaterally, nonlabored respirations   Cardiovascular: RRR, no murmurs, rubs, or gallops, palpable pedal pulses bilaterally  Gastrointestinal: Positive bowel sounds, soft, nontender, nondistended  Musculoskeletal: No bilateral ankle edema, no clubbing or cyanosis to extremities  Psychiatric: Appropriate affect, cooperative  Neurologic: Oriented x 3, strength symmetric in all extremities, Cranial Nerves grossly intact to confrontation, speech clear  Skin: No rashes      Brief Assessment/Plan :  See above for further detailed assessment and plan developed with APC which I have reviewed and/or edited.      Electronically signed by Radha Rodriguez DO, 10/02/18, 5:30 AM.

## 2018-10-02 NOTE — PLAN OF CARE
Problem: Cardiac: ACS (Acute Coronary Syndrome) (Adult)  Goal: Signs and Symptoms of Listed Potential Problems Will be Absent, Minimized or Managed (Cardiac: ACS)  Outcome: Ongoing (interventions implemented as appropriate)   10/02/18 0418   Goal/Outcome Evaluation   Problems Assessed (Acute Coronary Syndrome) all   Problems Present (Acute Coronary Syn) situational response

## 2018-10-02 NOTE — ED PROVIDER NOTES
Subjective   Ms. Carolyn Chaparro is a 58-year-old female presenting to the emergency department with complaints of mid-sternal chest pain with sudden onset around 21:00 this evening. The pain began shortly after taking a hydrocodone for sciatica before going to bed. She describes the pain as a pressure sensation, a 7/10 in severity, and it is not worsened with movement, walking, stretching, or breathing. She states that she ate chili this evening but does not suspect that this could be causing the symptoms. She also complains of SOA , but denies any cough. She denies any recent trauma, illness, or heavy lifting. She has had a stress test in the past for heart palpitations, and she now regularly takes Metoprolol.  The patient's mother had a CABG in her 70s. The patient has a hx of HTN. There are no other acute complaints at this time.  Lifelong nonsmoker.  No history of diabetes or dyslipidemia.        History provided by:  Patient  Chest Pain   Chest pain location: Mid-sternal.  Pain quality: pressure    Pain radiates to:  Does not radiate  Onset quality:  Sudden  Duration:  2 hours  Timing:  Constant  Progression:  Unchanged  Chronicity:  New  Context comment:  Getting ready for bed  Relieved by:  None tried  Worsened by:  Nothing  Ineffective treatments:  None tried  Associated symptoms: shortness of breath and weakness    Associated symptoms: no cough    Risk factors: hypertension        Review of Systems   Respiratory: Positive for shortness of breath. Negative for cough.    Cardiovascular: Positive for chest pain.   Neurological: Positive for weakness.   All other systems reviewed and are negative.      Past Medical History:   Diagnosis Date   • Anxiety    • Chronic constipation    • GERD (gastroesophageal reflux disease)    • Heart palpitations    • Hypertension    • Osteopenia    • Post-menopause on HRT (hormone replacement therapy)        No Known Allergies    Past Surgical History:   Procedure Laterality  Date   • LAPAROSCOPIC ASSISTED VAGINAL HYSTERECTOMY SALPINGO OOPHORECTOMY Bilateral    • LAPAROSCOPY WITH LASER     • PERINEOPLASTY     • POSTERIOR REPAIR         History reviewed. No pertinent family history.    Social History     Social History   • Marital status:      Social History Main Topics   • Smoking status: Never Smoker   • Smokeless tobacco: Never Used   • Alcohol use No   • Drug use: No   • Sexual activity: Yes     Partners: Male     Birth control/ protection: Surgical, Post-menopausal     Other Topics Concern   • Not on file         Objective   Physical Exam   Constitutional: She is oriented to person, place, and time. She appears well-developed and well-nourished. No distress.   HENT:   Head: Normocephalic and atraumatic.   Nose: Nose normal.   Eyes: Conjunctivae are normal. No scleral icterus.   Neck: Normal range of motion. Neck supple.   Cardiovascular: Regular rhythm and normal heart sounds.  Bradycardia present.    No murmur heard.  Pulmonary/Chest: Effort normal and breath sounds normal. No respiratory distress.   Abdominal: Soft. There is no tenderness.   Musculoskeletal: Normal range of motion. She exhibits no edema.   No reproduction of the patient's pain with palpation to the epigastrium or chest wall. No peripheral edema.   Neurological: She is alert and oriented to person, place, and time.   Skin: Skin is warm and dry.   Psychiatric: She has a normal mood and affect. Her behavior is normal.   Nursing note and vitals reviewed.      Procedures         ED Course  ED Course as of Oct 02 0321   Mon Oct 01, 2018   9719 Paged Dr. Batres to discuss the patient's case.  [CB]   Tue Oct 02, 2018   0038 I had nursing staff administer nitroglycerin sublingual.  The patient had complete resolution of her chest discomfort after a single nitroglycerin.  We have administered aspirin orally, Lovenox subcutaneously, nitroglycerin paste.  I discussed the case with Dr. Ta who will admit.   [MS]    0319 Elevated second Troponin.  Pt still pain free. Troponin I: (!!) 1.02 [MS]      ED Course User Index  [CB] Dandy Summers  [MS] Johnathon Logan MD     Recent Results (from the past 24 hour(s))   Comprehensive Metabolic Panel    Collection Time: 10/01/18 10:19 PM   Result Value Ref Range    Glucose 129 (H) 70 - 100 mg/dL    BUN 12 9 - 23 mg/dL    Creatinine 0.83 0.60 - 1.30 mg/dL    Sodium 137 132 - 146 mmol/L    Potassium 4.1 3.5 - 5.5 mmol/L    Chloride 100 99 - 109 mmol/L    CO2 31.0 20.0 - 31.0 mmol/L    Calcium 9.0 8.7 - 10.4 mg/dL    Total Protein 6.4 5.7 - 8.2 g/dL    Albumin 4.15 3.20 - 4.80 g/dL    ALT (SGPT) 34 7 - 40 U/L    AST (SGOT) 34 (H) 0 - 33 U/L    Alkaline Phosphatase 75 25 - 100 U/L    Total Bilirubin 0.4 0.3 - 1.2 mg/dL    eGFR Non African Amer 71 >60 mL/min/1.73    Globulin 2.3 gm/dL    A/G Ratio 1.8 1.5 - 2.5 g/dL    BUN/Creatinine Ratio 14.5 7.0 - 25.0    Anion Gap 6.0 3.0 - 11.0 mmol/L   Lipase    Collection Time: 10/01/18 10:19 PM   Result Value Ref Range    Lipase 38 6 - 51 U/L   BNP    Collection Time: 10/01/18 10:19 PM   Result Value Ref Range    BNP 34.0 0.0 - 100.0 pg/mL   Light Blue Top    Collection Time: 10/01/18 10:19 PM   Result Value Ref Range    Extra Tube hold for add-on    Green Top (Gel)    Collection Time: 10/01/18 10:19 PM   Result Value Ref Range    Extra Tube Hold for add-ons.    Lavender Top    Collection Time: 10/01/18 10:19 PM   Result Value Ref Range    Extra Tube hold for add-on    Gold Top - SST    Collection Time: 10/01/18 10:19 PM   Result Value Ref Range    Extra Tube Hold for add-ons.    CBC Auto Differential    Collection Time: 10/01/18 10:19 PM   Result Value Ref Range    WBC 4.90 3.50 - 10.80 10*3/mm3    RBC 4.44 3.89 - 5.14 10*6/mm3    Hemoglobin 13.7 11.5 - 15.5 g/dL    Hematocrit 39.9 34.5 - 44.0 %    MCV 89.9 80.0 - 99.0 fL    MCH 30.9 27.0 - 31.0 pg    MCHC 34.3 32.0 - 36.0 g/dL    RDW 12.5 11.3 - 14.5 %    RDW-SD 40.4 37.0 - 54.0 fl    MPV 10.1  6.0 - 12.0 fL    Platelets 245 150 - 450 10*3/mm3    Neutrophil % 48.4 41.0 - 71.0 %    Lymphocyte % 35.1 24.0 - 44.0 %    Monocyte % 10.0 0.0 - 12.0 %    Eosinophil % 5.9 (H) 0.0 - 3.0 %    Basophil % 0.6 0.0 - 1.0 %    Immature Grans % 0.2 0.0 - 0.6 %    Neutrophils, Absolute 2.37 1.50 - 8.30 10*3/mm3    Lymphocytes, Absolute 1.72 0.60 - 4.80 10*3/mm3    Monocytes, Absolute 0.49 0.00 - 1.00 10*3/mm3    Eosinophils, Absolute 0.29 0.00 - 0.30 10*3/mm3    Basophils, Absolute 0.03 0.00 - 0.20 10*3/mm3    Immature Grans, Absolute 0.01 0.00 - 0.03 10*3/mm3   POC Troponin, Rapid    Collection Time: 10/01/18 10:27 PM   Result Value Ref Range    Troponin I 0.00 0.00 - 0.07 ng/mL     Note: In addition to lab results from this visit, the labs listed above may include labs taken at another facility or during a different encounter within the last 24 hours. Please correlate lab times with ED admission and discharge times for further clarification of the services performed during this visit.    XR Chest 1 View   Final Result     No acute cardiopulmonary disease demonstrated.      THIS DOCUMENT HAS BEEN ELECTRONICALLY SIGNED BY NICKI BONILLA MD        Vitals:    10/01/18 2300 10/01/18 2330 10/02/18 0000 10/02/18 0100   BP: 162/83 125/67 143/84 158/90   BP Location:       Patient Position:       Pulse: 52 62 58 59   Resp:       Temp:       TempSrc:       SpO2: 98% 95% 95% 97%   Weight:       Height:         Medications   sodium chloride 0.9 % flush 10 mL (not administered)   nitroglycerin (NITROSTAT) SL tablet 0.4 mg (0.4 mg Sublingual Given 10/1/18 2323)   aspirin chewable tablet 324 mg (324 mg Oral Given 10/1/18 2243)   enoxaparin (LOVENOX) syringe 80 mg (80 mg Subcutaneous Given 10/2/18 0031)   nitroglycerin (NITROSTAT) ointment 0.5 inch (0.5 inches Topical Given 10/2/18 0030)     ECG/EMG Results (last 24 hours)     Procedure Component Value Units Date/Time    ECG 12 Lead [[id number]] Collected:  10/01/18 2137     Updated:   10/01/18 2228    Narrative:        ECG 12 Lead   Final Result   Test Reason : 2ND SET   Blood Pressure : **/** mmHG   Vent. Rate : 060 BPM     Atrial Rate : 060 BPM      P-R Int : 170 ms          QRS Dur : 098 ms       QT Int : 444 ms       P-R-T Axes : 037 011 036 degrees      QTc Int : 444 ms      Normal sinus rhythm   When compared with ECG of 01-OCT-2018 21:37,   Nonspecific T wave abnormality now evident in Inferior leads   Confirmed by MARCOS ABARCA MD ([id number]) on 10/2/2018 3:21:56 AM      Referred By:  TEVIN           Confirmed By:MARCOS ABARCA MD      ECG 12 Lead   Final Result   Test Reason : CHEST PAIN   Blood Pressure : **/** mmHG   Vent. Rate : 048 BPM     Atrial Rate : 048 BPM      P-R Int : 144 ms          QRS Dur : 096 ms       QT Int : 464 ms       P-R-T Axes : 046 022 063 degrees      QTc Int : 414 ms      Sinus bradycardia   Nonspecific ST abnormality   Abnormal ECG   No previous ECGs available   Confirmed by MARCOS ABARCA MD ([id number]) on 10/1/2018 10:28:39 PM      Referred By:  DAVON BRONSON           Confirmed By:MARCOS ABARCA MD                  HEART Score (for prediction of 6-week risk of major adverse cardiac event) reviewed and/or performed as part of the patient evaluation and treatment planning process.  The result associated with this review/performance is: 4           MDM    Final diagnoses:   Precordial chest pain   Elevated troponin       Documentation assistance provided by gamaliel Summers.  Information recorded by the scribe was done at my direction and has been verified and validated by me.     Dandy Summers  10/01/18 2311       Dandy Summers  10/01/18 9392       Marcos Abarca MD  10/02/18 0123       Marcos Abarca MD  10/02/18 8360

## 2018-10-02 NOTE — CONSULTS
[facility] Consult Note      Referring Provider: No ref. provider found  Primary Provider:  Kimberlee Pinto MD  Reason for Consultation: chest pain    Patient Care Team:  Kimberlee Pinto MD as PCP - General  Kimberlee Pinto MD as PCP - Family Medicine  GhislaineReed escobedo MD as Consulting Physician (Gynecology)    Chief complaint chest pain    Identification:  58 year old female    Problem list:    1.  Chest pain/ NSTEMI  2.  Palpitations  3.  Hypertension  4.  GERD  5.  Anxiety  6.  Surgeries:   A.  Total hysterectomy    Allergies:  Patient has no known allergies.    Home/Current Medications:      Scheduled Meds:  amitriptyline 10 mg Oral Nightly   aspirin 81 mg Oral Daily   atorvastatin 80 mg Oral Nightly   [START ON 10/3/2018] clopidogrel 75 mg Oral Daily   FLUoxetine 20 mg Oral Daily   metoprolol succinate XL 50 mg Oral Q PM   pantoprazole 40 mg Oral Q AM     Continuous Infusions:  sodium chloride 75 mL/hr Last Rate: 75 mL/hr (10/02/18 0800)     PRN Meds:.•  acetaminophen  •  ondansetron **OR** ondansetron  •  sodium chloride    History of present illness:    Patient is a very pleasant 58 year old female with the above noted medical history who we have been asked to see regarding elevated troponins.  She presented to the emergency department last night a little bit after 9 PM with the sudden onset of midsternal chest pain while she was getting ready for bed.  She states that it did radiate into her back and there were no other associated symptoms.  She initially thought she was having an esophageal spasm and took a drink of water as she normally does without relief.  She finally received relief after having nitroglycerin paste on her chest for approximately one hour.  She has had no further recurrence of this chest pain.  She states that she has had no previous symptoms leading up to this event.  She states that she walked to the football game over the weekend and walk back home  (approximately 20 minutes each way) without difficulty.  She has not been limited in doing her daily chores by any symptoms either.  She does report having a treadmill stress test approximately 5 years ago at [facility] for her palpitations.  She has been on metoprolol with adequate control since that time.  She has had no further cardiac testing since then.  She does have a family history of coronary disease with her mother having a known history of coronary disease and CABG in her 70s.  Her father had a history of congestive heart failure as well as an aortic aneurysm.  She has never been screened for an aneurysm.  Her primary complaint at this time his headache from the nitroglycerin paste and some nausea.    Cardiac Risk Factors:  Hypertension, family history coronary disease      Stress test within last 6 months:  treadmill stress 5 years ago                        Details: chris, Dr Vizcaino     Social History:  Social History     Social History   • Marital status:      Spouse name: N/A   • Number of children: N/A   • Years of education: N/A     Occupational History   • Not on file.     Social History Main Topics   • Smoking status: Never Smoker   • Smokeless tobacco: Never Used   • Alcohol use No   • Drug use: No   • Sexual activity: Yes     Partners: Male     Birth control/ protection: Surgical, Post-menopausal     Other Topics Concern   • Not on file     Social History Narrative   • No narrative on file     Family History:  History reviewed. No pertinent family history.     Review of Systems  Pertinent positives are listed in the HPI.  All other systems reviewed are negative.         Objective     Vital Sign Min/Max for last 24 hours  Temp  Min: 97.7 °F (36.5 °C)  Max: 98.3 °F (36.8 °C)   BP  Min: 125/67  Max: 168/87   Pulse  Min: 47  Max: 65   Resp  Min: 16  Max: 18   SpO2  Min: 93 %  Max: 100 %   No Data Recorded   Weight  Min: 75.8 kg (167 lb)  Max: 79.4 kg (175 lb)     Flowsheet Rows       "First Filed Value   Admission Height  172.7 cm (68\") Documented at 10/01/2018 2131   Admission Weight  79.4 kg (175 lb) Documented at 10/01/2018 2131          Physical Exam:    GENERAL: well-developed, well-nourished; in no acute distress.   NECK:  There is no jugular venous distention at 30°.  Carotid upstrokes are 2+ and  symmetrical without bruits.   LUNGS: Clear to auscultation bilaterally without wheezing, rhonchi, or rales noted.   CARDIOVASCULAR: The heart has a regular rate with a normal S1 and S2. There is no murmur, gallop, rub, or click appreciated. The PMI is nondisplaced.   ABDOMEN: Soft and nontender  NEUROLOGICAL: Nonfocal; Alert and oriented  PERIPHERAL VASCULAR:  Posterior tibial pulses are 2+ and symmetrical. There is no peripheral edema.   SKIN:  Warm and dry  PSYCHIATRIC: normal mood and affect; behavior appropriate     EKG:  SR    Echocardiogram:  To be done today    Labs:      Results from last 7 days  Lab Units 10/02/18  0604 10/01/18  2219   SODIUM mmol/L 133 137   POTASSIUM mmol/L 4.5 4.1   CHLORIDE mmol/L 99 100   CO2 mmol/L 29.0 31.0   BUN mg/dL 14 12   CREATININE mg/dL 0.65 0.83   CALCIUM mg/dL 8.9 9.0   BILIRUBIN mg/dL  --  0.4   ALK PHOS U/L  --  75   ALT (SGPT) U/L  --  34   AST (SGOT) U/L  --  34*   GLUCOSE mg/dL 108* 129*       Lab Results (last 24 hours)     Procedure Component Value Units Date/Time    Troponin [[id number]]  (Abnormal) Collected:  10/02/18 0604    Specimen:  Blood Updated:  10/02/18 0742     Troponin I 9.864 (C) ng/mL      Comment: Results may be falsely decreased if patient taking Biotin.       Lipid Panel [[id number]]  (Normal) Collected:  10/02/18 0604    Specimen:  Blood Updated:  10/02/18 0731     Total Cholesterol 153 mg/dL      Triglycerides 126 mg/dL      HDL Cholesterol 41 mg/dL      LDL Cholesterol  104 mg/dL     CBC (No Diff) [[id number]]  (Normal) Collected:  10/02/18 0605    Specimen:  Blood Updated:  10/02/18 0650     WBC 7.11 10*3/mm3      RBC 4.15 " 10*6/mm3      Hemoglobin 12.6 g/dL      Hematocrit 36.9 %      MCV 88.9 fL      MCH 30.4 pg      MCHC 34.1 g/dL      RDW 12.3 %      RDW-SD 39.6 fl      MPV 10.3 fL      Platelets 243 10*3/mm3     Hemoglobin A1c [[id number]] Collected:  10/02/18 0605    Specimen:  Blood Updated:  10/02/18 0641    POC Troponin, Rapid [[id number]]  (Abnormal) Collected:  10/02/18 0249    Specimen:  Blood Updated:  10/02/18 0305     Troponin I 1.02 (C) ng/mL      Comment: Serial Number: [id number]:  [id number]       BNP [[id number]]  (Normal) Collected:  10/01/18 2219    Specimen:  Blood Updated:  10/01/18 2311     BNP 34.0 pg/mL      Comment: Results may be falsely decreased if patient taking Biotin.       Lipase [[id number]]  (Normal) Collected:  10/01/18 2219    Specimen:  Blood Updated:  10/01/18 2304     Lipase 38 U/L     CBC Auto Differential [[id number]]  (Abnormal) Collected:  10/01/18 2219    Specimen:  Blood Updated:  10/01/18 2250     WBC 4.90 10*3/mm3      RBC 4.44 10*6/mm3      Hemoglobin 13.7 g/dL      Hematocrit 39.9 %      MCV 89.9 fL      MCH 30.9 pg      MCHC 34.3 g/dL      RDW 12.5 %      RDW-SD 40.4 fl      MPV 10.1 fL      Platelets 245 10*3/mm3      Neutrophil % 48.4 %      Lymphocyte % 35.1 %      Monocyte % 10.0 %      Eosinophil % 5.9 (H) %      Basophil % 0.6 %      Immature Grans % 0.2 %      Neutrophils, Absolute 2.37 10*3/mm3      Lymphocytes, Absolute 1.72 10*3/mm3      Monocytes, Absolute 0.49 10*3/mm3      Eosinophils, Absolute 0.29 10*3/mm3      Basophils, Absolute 0.03 10*3/mm3      Immature Grans, Absolute 0.01 10*3/mm3     POC Troponin, Rapid [[id number]]  (Normal) Collected:  10/01/18 2227    Specimen:  Blood Updated:  10/01/18 2240     Troponin I 0.00 ng/mL      Comment: Serial Number: [id number]:  [id number]           Lab Results   Component Value Date    TROPONINI 9.864 (C) 10/02/2018        Lab Results   Component Value Date    CHOL 153 10/02/2018     Lab Results   Component Value Date    HDL 41  10/02/2018     No results found for: LDLDIRECT  Lab Results   Component Value Date    TRIG 126 10/02/2018     No components found for: CHOLHDL  No results found for: INR, PROTIME    Ejection Fraction:  unknown      Results Review:  I reviewed the patients new clinical results.      Assessment:  1.  NSTEMI  2.  Hypertension  3.  Palpitation hx  4.  GERD        Plan:  Left cardiac catheterization today with Dr. Hellen Allan.  The risks, benefits, potential complications of all been discussed with the patient and she is agreeable to proceed.  She does not have any upcoming surgeries planned in the near future.  Right radial access will be utilized.    Echocardiogram is artery been ordered  She will need a CT scan for family history of aortic aneurysm.    I discussed the patients findings and my recommendations with patient.    Scribed for Hellen Allan MD by REE Gutiérrez on 10/01/2018 at 9:11 AM     REE Stokes  10/02/18  9:11 AM    I Hellen Allan MD personally performed the services described in this documentation as scribed by the above individual in my presence, and it is both accurate and complete.    Hellen Allan MD, FACC

## 2018-10-02 NOTE — PLAN OF CARE
Problem: Patient Care Overview  Goal: Plan of Care Review  Outcome: Ongoing (interventions implemented as appropriate)   10/02/18 0418   Coping/Psychosocial   Plan of Care Reviewed With patient   Plan of Care Review   Progress no change

## 2018-10-03 ENCOUNTER — TRANSCRIBE ORDERS (OUTPATIENT)
Dept: CARDIAC REHAB | Facility: HOSPITAL | Age: 58
End: 2018-10-03

## 2018-10-03 ENCOUNTER — APPOINTMENT (OUTPATIENT)
Dept: CT IMAGING | Facility: HOSPITAL | Age: 58
End: 2018-10-03

## 2018-10-03 DIAGNOSIS — I21.4 NSTEMI (NON-ST ELEVATED MYOCARDIAL INFARCTION) (HCC): Primary | ICD-10-CM

## 2018-10-03 PROBLEM — I25.42 CORONARY ARTERY DISSECTION: Status: ACTIVE | Noted: 2018-10-03

## 2018-10-03 LAB
ANION GAP SERPL CALCULATED.3IONS-SCNC: 6 MMOL/L (ref 3–11)
BUN BLD-MCNC: 7 MG/DL (ref 9–23)
BUN/CREAT SERPL: 9.2 (ref 7–25)
CALCIUM SPEC-SCNC: 9 MG/DL (ref 8.7–10.4)
CHLORIDE SERPL-SCNC: 100 MMOL/L (ref 99–109)
CO2 SERPL-SCNC: 29 MMOL/L (ref 20–31)
CREAT BLD-MCNC: 0.76 MG/DL (ref 0.6–1.3)
GFR SERPL CREATININE-BSD FRML MDRD: 79 ML/MIN/1.73
GLUCOSE BLD-MCNC: 133 MG/DL (ref 70–100)
POTASSIUM BLD-SCNC: 3.6 MMOL/L (ref 3.5–5.5)
SODIUM BLD-SCNC: 135 MMOL/L (ref 132–146)
TROPONIN I SERPL-MCNC: 49.81 NG/ML

## 2018-10-03 PROCEDURE — 80048 BASIC METABOLIC PNL TOTAL CA: CPT | Performed by: INTERNAL MEDICINE

## 2018-10-03 PROCEDURE — 99233 SBSQ HOSP IP/OBS HIGH 50: CPT | Performed by: INTERNAL MEDICINE

## 2018-10-03 PROCEDURE — 71275 CT ANGIOGRAPHY CHEST: CPT

## 2018-10-03 PROCEDURE — 93005 ELECTROCARDIOGRAM TRACING: CPT | Performed by: INTERNAL MEDICINE

## 2018-10-03 PROCEDURE — 99232 SBSQ HOSP IP/OBS MODERATE 35: CPT | Performed by: INTERNAL MEDICINE

## 2018-10-03 PROCEDURE — 0 IOPAMIDOL PER 1 ML: Performed by: INTERNAL MEDICINE

## 2018-10-03 PROCEDURE — 74174 CTA ABD&PLVS W/CONTRAST: CPT

## 2018-10-03 PROCEDURE — 84484 ASSAY OF TROPONIN QUANT: CPT | Performed by: INTERNAL MEDICINE

## 2018-10-03 RX ORDER — ROSUVASTATIN CALCIUM 10 MG/1
5 TABLET, COATED ORAL NIGHTLY
Status: DISCONTINUED | OUTPATIENT
Start: 2018-10-03 | End: 2018-10-05 | Stop reason: HOSPADM

## 2018-10-03 RX ORDER — CETIRIZINE HYDROCHLORIDE 10 MG/1
10 TABLET ORAL NIGHTLY
COMMUNITY
End: 2019-07-17

## 2018-10-03 RX ORDER — MELATONIN
1000 DAILY
COMMUNITY

## 2018-10-03 RX ORDER — CHOLECALCIFEROL (VITAMIN D3) 125 MCG
1000 CAPSULE ORAL DAILY
COMMUNITY

## 2018-10-03 RX ADMIN — Medication 3 ML: at 20:14

## 2018-10-03 RX ADMIN — PANTOPRAZOLE SODIUM 40 MG: 40 TABLET, DELAYED RELEASE ORAL at 06:02

## 2018-10-03 RX ADMIN — FLUOXETINE HYDROCHLORIDE 20 MG: 20 CAPSULE ORAL at 17:41

## 2018-10-03 RX ADMIN — POLYETHYLENE GLYCOL (3350) 17 G: 17 POWDER, FOR SOLUTION ORAL at 20:12

## 2018-10-03 RX ADMIN — MAGNESIUM HYDROXIDE 10 ML: 2400 SUSPENSION ORAL at 06:02

## 2018-10-03 RX ADMIN — METOPROLOL SUCCINATE 50 MG: 50 TABLET, EXTENDED RELEASE ORAL at 17:41

## 2018-10-03 RX ADMIN — AMITRIPTYLINE HYDROCHLORIDE 10 MG: 10 TABLET, FILM COATED ORAL at 20:13

## 2018-10-03 RX ADMIN — IOPAMIDOL 99 ML: 755 INJECTION, SOLUTION INTRAVENOUS at 15:30

## 2018-10-03 RX ADMIN — CLOPIDOGREL BISULFATE 75 MG: 75 TABLET ORAL at 09:20

## 2018-10-03 RX ADMIN — ASPIRIN 325 MG: 325 TABLET, DELAYED RELEASE ORAL at 09:20

## 2018-10-03 RX ADMIN — ROSUVASTATIN CALCIUM 5 MG: 10 TABLET, FILM COATED ORAL at 20:13

## 2018-10-03 NOTE — PROGRESS NOTES
"Clinical Nutrition   Reason For Visit: MST score 2+    Patient Name: Carolyn Chaparro  YOB: 1960  MRN: [id number]  Date of Encounter: 10/03/18 12:04 PM  Admission date: 10/1/2018        Nutrition Assessment     Hospital Problem List  Principal Problem:    NSTEMI (non-ST elevated myocardial infarction) (CMS/[facility])  Active Problems:    Hypertension    GERD (gastroesophageal reflux disease)    Precordial chest pain          PMH: She  has a past medical history of Anxiety; Chronic constipation; GERD (gastroesophageal reflux disease); Heart palpitations; Hypertension; Osteopenia; and Post-menopause on HRT (hormone replacement therapy).   PSxH: She  has a past surgical history that includes Laser laparoscopy; laparoscopic assisted vaginal hysterectomy salpingo oophorectomy (Bilateral); Posterior repair; and Perineoplasty.       Applicable medical tests/procedures since admission:   10/2: Left heart cath     Reported/Observed/Food/Nutrition Related History     Reports was nauseous after heart cath 10/2 better now.  Has chronic constipation and uses Miralax daily at home.      Anthropometrics   Height: 68 \"  Weight: 167 lb  BMI: 25.39  BMI classification: Overweight: 25.0-29.9kg/m2    UBW: 172 lb  IBW: 140 lbs      Weight change:  5 lbs weight loss over 4 months     Labs reviewed   Labs reviewed: Yes    Results from last 7 days  Lab Units 10/03/18  0629 10/02/18  0604 10/01/18  2219   SODIUM mmol/L 135 133 137   POTASSIUM mmol/L 3.6 4.5 4.1   CHLORIDE mmol/L 100 99 100   CO2 mmol/L 29.0 29.0 31.0   BUN mg/dL 7* 14 12   CREATININE mg/dL 0.76 0.65 0.83   GLUCOSE mg/dL 133* 108* 129*   CALCIUM mg/dL 9.0 8.9 9.0   CHOLESTEROL mg/dL  --  153  --    TRIGLYCERIDES mg/dL  --  126  --        Results from last 7 days  Lab Units 10/02/18  0605 10/02/18  0604 10/01/18  2219   WBC 10*3/mm3 7.11  --  4.90   ALBUMIN g/dL  --   --  4.15   CHOLESTEROL mg/dL  --  153  --    TRIGLYCERIDES mg/dL  --  126  --            Lab " Results  Lab Value Date/Time   HGBA1C 5.10 10/02/2018 0605     Medications reviewed   Medications reviewed: Yes  Pertinent: Miralax daily       Current Nutrition Prescription   PO: Diet Regular; Consistent Carbohydrate, Cardiac      Evaluation of Received Nutrient/Fluid Intake:  Insufficient data       Nutrition Diagnosis     Problem No nutrition diagnosis at this time   Etiology    Signs/Symptoms        Intervention   Intervention: Follow treatment progress, Care plan reviewed, Advise alternate selection, Encourage intake      Goal:   General: Nutrition support treatment  PO: Establish PO, Tolerate PO        Monitoring/Evaluation:       Monitoring/Evaluation: Per protocol  Will Continue to follow per protocol  Heide Munoz RD  Time Spent: 30 min

## 2018-10-03 NOTE — PROGRESS NOTES
Pt. Referred for Phase II Cardiac Rehab. Staff discussed benefits of exercise, program protocol, and educational material provided. Teach back verified.  Patient scheduled for orientation at [facility] on 10/24/18 at 1300.

## 2018-10-03 NOTE — PROGRESS NOTES
"[facility] Daily Note       LOS: 1 day   Patient Care Team:  Kimberlee Pinto MD as PCP - General  Kimberlee Pinto MD as PCP - Family Medicine  GhislaineRede escobedo MD as Consulting Physician (Gynecology)    Chief Complaint:  Non-ST elevation MI/chest pain    Subjective     Subjective: No complaints this morning.  She had a little bit nauseated yesterday and had some chest pressure right after the procedure were all that has resolved.  Her blood pressure has been good overnight on the Cardene.    Review of Systems:   As above.    Medications:    amitriptyline 10 mg Oral Nightly   aspirin 325 mg Oral Daily   atorvastatin 10 mg Oral Nightly   clopidogrel 75 mg Oral Daily   FLUoxetine 20 mg Oral Q PM   metoprolol succinate XL 50 mg Oral Q PM   pantoprazole 40 mg Oral Q AM   polyethylene glycol 17 g Oral Daily   sodium chloride 3 mL Intravenous Q12H       Objective     Vital Sign Min/Max for last 24 hours  Temp  Min: 97.9 °F (36.6 °C)  Max: 99.2 °F (37.3 °C)   BP  Min: 107/69  Max: 158/70   Pulse  Min: 55  Max: 73   Resp  Min: 16  Max: 18   SpO2  Min: 90 %  Max: 99 %   No Data Recorded   No Data Recorded      Intake/Output Summary (Last 24 hours) at 10/03/18 0728  Last data filed at 10/02/18 1700   Gross per 24 hour   Intake                0 ml   Output                0 ml   Net                0 ml        Flowsheet Rows      First Filed Value   Admission Height  172.7 cm (68\") Documented at 10/01/2018 2131   Admission Weight  79.4 kg (175 lb) Documented at 10/01/2018 2131          Physical Exam:    General: Alert and oriented.   Cardiovascular: Heart has a nondisplaced focal PMI. Regular rate and rhythm without murmur, gallop or rub.  Lungs: Clear without rales or wheezes. Equal expansion is noted.   Abdomen: Soft, nontender.  Extremities: Show no edema. Good right radial pulse  Skin: warm and dry.     Results Review:    I reviewed the patient's new clinical results.  EKG:  Tele: Normal sinus " rhythm    Labs:      Results from last 7 days  Lab Units 10/02/18  0604 10/01/18  2219   SODIUM mmol/L 133 137   POTASSIUM mmol/L 4.5 4.1   CHLORIDE mmol/L 99 100   CO2 mmol/L 29.0 31.0   BUN mg/dL 14 12   CREATININE mg/dL 0.65 0.83   CALCIUM mg/dL 8.9 9.0   BILIRUBIN mg/dL  --  0.4   ALK PHOS U/L  --  75   ALT (SGPT) U/L  --  34   AST (SGOT) U/L  --  34*   GLUCOSE mg/dL 108* 129*       Results from last 7 days  Lab Units 10/02/18  0605 10/01/18  2219   WBC 10*3/mm3 7.11 4.90   HEMOGLOBIN g/dL 12.6 13.7   HEMATOCRIT % 36.9 39.9   PLATELETS 10*3/mm3 243 245     Lab Results   Component Value Date    TROPONINI 9.864 (C) 10/02/2018     Lab Results   Component Value Date    CHOL 153 10/02/2018     Lab Results   Component Value Date    TRIG 126 10/02/2018     Lab Results   Component Value Date    HDL 41 10/02/2018     No components found for: LDLCALC  No results found for: INR, PROTIME      Ejection Fraction:    Assessment   Assessment:  1.  NSTEMI, status post spontaneous dissection of the distal circumflex OM 2.   2.  Hypertension  3.  Palpitation hx  4.  GERD      Plan:    DC Cardene  Change Lipitor to Crestor as her HDL was only 41  May shower daily  CT scan of the chest and abdomen today to evaluate the patient for aneurysm as her pain as her father had an abdominal aortic aneurysm      Hellen Allan MD  10/03/18  7:28 AM

## 2018-10-03 NOTE — PROGRESS NOTES
Continued Stay Note  [facility]     Patient Name: Carolyn Chaparro  MRN: [id number]  Today's Date: 10/3/2018    Admit Date: 10/1/2018          Discharge Plan     Row Name 10/03/18 1402       Plan    Plan update    Patient/Family in Agreement with Plan yes    Plan Comments Spoke with patient at bedside regarding discharge plan.  Patient hoping to go home tomorrow.  No discharge needs noted at this time.  CM following.  Patient plan is to discharge home via car with family to transport when medically ready.     Final Discharge Disposition Code 01 - home or self-care              Discharge Codes    No documentation.       Expected Discharge Date and Time     Expected Discharge Date Expected Discharge Time    Oct 4, 2018             Kalyn Balderrama RN

## 2018-10-03 NOTE — PLAN OF CARE
Problem: Patient Care Overview  Goal: Plan of Care Review  Outcome: Ongoing (interventions implemented as appropriate)   10/03/18 1500   OTHER   Outcome Summary Pt doing well today, no complaints of chest pain at this time. CT scans done to screen for AA. Cardene gtt d/c. VS stable and pt remains on RA. Will continue to monitor.      Goal: Individualization and Mutuality  Outcome: Ongoing (interventions implemented as appropriate)    Goal: Discharge Needs Assessment  Outcome: Ongoing (interventions implemented as appropriate)    Goal: Interprofessional Rounds/Family Conf  Outcome: Ongoing (interventions implemented as appropriate)    Goal: Plan of Care Review  Outcome: Ongoing (interventions implemented as appropriate)    Goal: Individualization and Mutuality  Outcome: Ongoing (interventions implemented as appropriate)    Goal: Discharge Needs Assessment  Outcome: Ongoing (interventions implemented as appropriate)    Goal: Interprofessional Rounds/Family Conf  Outcome: Ongoing (interventions implemented as appropriate)      Problem: Cardiac: ACS (Acute Coronary Syndrome) (Adult)  Goal: Signs and Symptoms of Listed Potential Problems Will be Absent, Minimized or Managed (Cardiac: ACS)  Outcome: Ongoing (interventions implemented as appropriate)      Problem: Pain, Acute (Adult)  Goal: Identify Related Risk Factors and Signs and Symptoms  Outcome: Ongoing (interventions implemented as appropriate)    Goal: Acceptable Pain Control/Comfort Level  Outcome: Ongoing (interventions implemented as appropriate)

## 2018-10-03 NOTE — PROGRESS NOTES
[facility] Medicine Services  PROGRESS NOTE    Patient Name: Carolyn Chaparro  : 1960  MRN: [id number]    Date of Admission: 10/1/2018  Length of Stay: 1  Primary Care Physician: Kimberlee Pinto MD    Subjective   Subjective     CC: f/u NSTEMI    HPI: Up in bed with  at bedside. Feeling well. Has no new complaints. Denies chest pain.    Review of Systems  Gen- No fevers, chills  CV- No chest pain, palpitations  Resp- No cough, dyspnea  GI- No N/V/D, abd pain    Otherwise ROS is negative except as mentioned in the HPI.    Objective   Objective     Vital Signs:   Temp:  [97.9 °F (36.6 °C)-99.2 °F (37.3 °C)] 98.6 °F (37 °C)  Heart Rate:  [55-73] 66  Resp:  [18] 18  BP: (107-158)/(60-86) 132/72  Total (NIH Stroke Scale): 0     Physical Exam:  Constitutional: No acute distress, awake, alert  HENT: NCAT, mucous membranes moist  Respiratory: Clear to auscultation bilaterally, respiratory effort normal   Cardiovascular: RRR, no murmurs, rubs, or gallops, palpable pedal pulses bilaterally  Gastrointestinal: Positive bowel sounds, soft, nontender, nondistended  Musculoskeletal: No bilateral ankle edema  Psychiatric: Appropriate affect, cooperative  Neurologic: Oriented x 3, strength symmetric in all extremities, Cranial Nerves grossly intact to confrontation, speech clear  Skin: No rashes    Results Reviewed:  I have personally reviewed current lab, radiology, and data and agree.      Results from last 7 days  Lab Units 10/02/18  0605 10/01/18  2219   WBC 10*3/mm3 7.11 4.90   HEMOGLOBIN g/dL 12.6 13.7   HEMATOCRIT % 36.9 39.9   PLATELETS 10*3/mm3 243 245       Results from last 7 days  Lab Units 10/03/18  0629 10/02/18  0604 10/01/18  2219   SODIUM mmol/L 135 133 137   POTASSIUM mmol/L 3.6 4.5 4.1   CHLORIDE mmol/L 100 99 100   CO2 mmol/L 29.0 29.0 31.0   BUN mg/dL 7* 14 12   CREATININE mg/dL 0.76 0.65 0.83   GLUCOSE mg/dL 133* 108* 129*   CALCIUM mg/dL 9.0 8.9 9.0   ALT (SGPT)  U/L  --   --  34   AST (SGOT) U/L  --   --  34*   TROPONIN I ng/mL 49.814* 9.864*  --      Estimated Creatinine Clearance: 98.9 mL/min (by C-G formula based on SCr of 0.76 mg/dL).  BNP   Date Value Ref Range Status   10/01/2018 34.0 0.0 - 100.0 pg/mL Final     Comment:     Results may be falsely decreased if patient taking Biotin.       Microbiology Results Abnormal     None        CXR personally reviewed without acute chest disease. Agree with interpretation.     [facility] notable for coronary dissection.    Results for orders placed during the hospital encounter of 10/01/18   Adult Transthoracic Echo Complete W/ Cont if Necessary Per Protocol    Narrative · Left ventricular systolic function is normal. Estimated EF = 60%.  · Trace mitral and tricuspid regurgitation.          I have reviewed the medications.      amitriptyline 10 mg Oral Nightly   aspirin 325 mg Oral Daily   clopidogrel 75 mg Oral Daily   FLUoxetine 20 mg Oral Q PM   metoprolol succinate XL 50 mg Oral Q PM   pantoprazole 40 mg Oral Q AM   pharmacy consult - MTM  Does not apply Daily   polyethylene glycol 17 g Oral Daily   rosuvastatin 5 mg Oral Nightly   sodium chloride 3 mL Intravenous Q12H         Assessment/Plan   Assessment / Plan     Hospital Problem List     * (Principal)NSTEMI (non-ST elevated myocardial infarction) (CMS/[facility])    Hypertension    GERD (gastroesophageal reflux disease)    Precordial chest pain    Coronary artery dissection          Brief Hospital Course to date:  Carolyn Chaparro is a 58 y.o. female admitted with chest pain and elevated troponin found to have a spontaneous coronary artery dissection.    Assessment & Plan:  --Dr. Allan following. Continue Integrilin and other medical therapy. Echo reviewed and unremarkable.  --Stop IV cardene. Continue metoprolol as above.   --CT c/a/p as ordered by Dr. Allan to r/o aneurysm.  --OOB.    CODE STATUS:   Code Status and Medical Interventions:   Ordered at: 10/02/18  1212     Level Of Support Discussed With:    Patient     Code Status:    CPR     Medical Interventions (Level of Support Prior to Arrest):    Full       Disposition: I expect the patient to be discharged home in 1-2 days when okay with cardiology.    Electronically signed by Ivanna Valles II, DO, 10/03/18, 1:12 PM.

## 2018-10-03 NOTE — PLAN OF CARE
Problem: Patient Care Overview  Goal: Plan of Care Review  Outcome: Ongoing (interventions implemented as appropriate)   10/03/18 0328   Coping/Psychosocial   Plan of Care Reviewed With patient   Plan of Care Review   Progress no change   OTHER   Outcome Summary Pt has rested throughout the night with no complaints of chest pain. She has remained on room air with stable vitals. took all meds. No further changes. Will continue to monitor.        Problem: Cardiac: ACS (Acute Coronary Syndrome) (Adult)  Goal: Signs and Symptoms of Listed Potential Problems Will be Absent, Minimized or Managed (Cardiac: ACS)  Outcome: Ongoing (interventions implemented as appropriate)   10/02/18 9674   Goal/Outcome Evaluation   Problems Assessed (Acute Coronary Syndrome) all   Problems Present (Acute Coronary Syn) situational response       Problem: Pain, Acute (Adult)  Goal: Acceptable Pain Control/Comfort Level  Outcome: Ongoing (interventions implemented as appropriate)   10/03/18 0328   Pain, Acute (Adult)   Acceptable Pain Control/Comfort Level achieves outcome

## 2018-10-04 PROCEDURE — 99231 SBSQ HOSP IP/OBS SF/LOW 25: CPT | Performed by: PHYSICIAN ASSISTANT

## 2018-10-04 PROCEDURE — 99232 SBSQ HOSP IP/OBS MODERATE 35: CPT | Performed by: INTERNAL MEDICINE

## 2018-10-04 RX ADMIN — POLYETHYLENE GLYCOL (3350) 17 G: 17 POWDER, FOR SOLUTION ORAL at 20:40

## 2018-10-04 RX ADMIN — AMITRIPTYLINE HYDROCHLORIDE 10 MG: 10 TABLET, FILM COATED ORAL at 20:40

## 2018-10-04 RX ADMIN — Medication 3 ML: at 20:41

## 2018-10-04 RX ADMIN — CLOPIDOGREL BISULFATE 75 MG: 75 TABLET ORAL at 09:19

## 2018-10-04 RX ADMIN — ASPIRIN 325 MG: 325 TABLET, DELAYED RELEASE ORAL at 09:19

## 2018-10-04 RX ADMIN — ROSUVASTATIN CALCIUM 5 MG: 10 TABLET, FILM COATED ORAL at 20:40

## 2018-10-04 RX ADMIN — PANTOPRAZOLE SODIUM 40 MG: 40 TABLET, DELAYED RELEASE ORAL at 06:07

## 2018-10-04 RX ADMIN — FLUOXETINE HYDROCHLORIDE 20 MG: 20 CAPSULE ORAL at 17:53

## 2018-10-04 NOTE — PROGRESS NOTES
"[facility] Daily Note       LOS: 2 days   Patient Care Team:  Kimberlee Pinto MD as PCP - General  Kimberlee Pinto MD as PCP - Family Medicine  GhislaineReed escobedo MD as Consulting Physician (Gynecology)    Chief Complaint:  Non-ST elevation MI/chest pain    Subjective     Subjective: No complaints.  No chest pain.  No shortness of breath.  Overall feels good.  Is anxious to go home in the morning.      Review of Systems:   As above.    Medications:    amitriptyline 10 mg Oral Nightly   aspirin 325 mg Oral Daily   clopidogrel 75 mg Oral Daily   FLUoxetine 20 mg Oral Q PM   metoprolol succinate XL 50 mg Oral Q PM   pantoprazole 40 mg Oral Q AM   pharmacy consult - MTM  Does not apply Daily   polyethylene glycol 17 g Oral Daily   rosuvastatin 5 mg Oral Nightly   sodium chloride 3 mL Intravenous Q12H       Objective     Vital Sign Min/Max for last 24 hours  Temp  Min: 98 °F (36.7 °C)  Max: 98.7 °F (37.1 °C)   BP  Min: 92/55  Max: 128/86   Pulse  Min: 54  Max: 82   Resp  Min: 16  Max: 18   SpO2  Min: 87 %  Max: 97 %   No Data Recorded   No Data Recorded      Intake/Output Summary (Last 24 hours) at 10/04/18 0828  Last data filed at 10/03/18 1700   Gross per 24 hour   Intake              720 ml   Output                0 ml   Net              720 ml        Flowsheet Rows      First Filed Value   Admission Height  172.7 cm (68\") Documented at 10/01/2018 2131   Admission Weight  79.4 kg (175 lb) Documented at 10/01/2018 2131          Physical Exam:    General: Alert and oriented   Cardiovascular: Heart has a nondisplaced focal PMI. Regular rate and rhythm without murmur, gallop or rub.  Lungs: Clear without rales or wheezes. Equal expansion is noted.   Extremities: Show no edema.  Skin: warm and dry.  Neurologic: nonfocal     Results Review:    I reviewed the patient's new clinical results.    Tele:  NSR    Labs:      Results from last 7 days  Lab Units 10/03/18  0629 10/02/18  0604 10/01/18  2219 "   SODIUM mmol/L 135 133 137   POTASSIUM mmol/L 3.6 4.5 4.1   CHLORIDE mmol/L 100 99 100   CO2 mmol/L 29.0 29.0 31.0   BUN mg/dL 7* 14 12   CREATININE mg/dL 0.76 0.65 0.83   CALCIUM mg/dL 9.0 8.9 9.0   BILIRUBIN mg/dL  --   --  0.4   ALK PHOS U/L  --   --  75   ALT (SGPT) U/L  --   --  34   AST (SGOT) U/L  --   --  34*   GLUCOSE mg/dL 133* 108* 129*       Results from last 7 days  Lab Units 10/02/18  0605 10/01/18  2219   WBC 10*3/mm3 7.11 4.90   HEMOGLOBIN g/dL 12.6 13.7   HEMATOCRIT % 36.9 39.9   PLATELETS 10*3/mm3 243 245     Lab Results   Component Value Date    TROPONINI 49.814 (C) 10/03/2018    TROPONINI 9.864 (C) 10/02/2018     Lab Results   Component Value Date    CHOL 153 10/02/2018     Lab Results   Component Value Date    TRIG 126 10/02/2018     Lab Results   Component Value Date    HDL 41 10/02/2018         LDL   104      10/02/2018    CT chest/ abdomen:  Generalized ectasia of ascending thoracic aorta to a max of 3.9cm.      Ejection Fraction:  normal    Assessment   Assessment:  1.  NSTEMI, status post spontaneous dissection of the distal circumflex OM 2.   2.  Hypertension  3.  Palpitation hx  4.  GERD  5.  Hyperlipidemia  6.  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        Plan:    Continue current medications.  Home in a.m.    Marcia Carter, APRN  10/04/18  8:28 AM    I Hellen Allan MD personally performed the services described in this documentation as scribed by the above individual in my presence, and it is both accurate and complete.    Hellen Allan MD, FACC

## 2018-10-04 NOTE — PLAN OF CARE
Problem: Patient Care Overview  Goal: Plan of Care Review  Outcome: Ongoing (interventions implemented as appropriate)   10/04/18 0453   Coping/Psychosocial   Plan of Care Reviewed With patient   Plan of Care Review   Progress no change   OTHER   Outcome Summary patient rested during most of shift. patient without chest pain at this time. BP low late in shift. with SBP in the 90's patient asleep at that time and patient awakened and BP recheck 110/51. remained normal sinus rhythm at this time. will continue to monitor.        Problem: Cardiac: ACS (Acute Coronary Syndrome) (Adult)  Goal: Signs and Symptoms of Listed Potential Problems Will be Absent, Minimized or Managed (Cardiac: ACS)  Outcome: Ongoing (interventions implemented as appropriate)      Problem: Pain, Acute (Adult)  Goal: Acceptable Pain Control/Comfort Level  Outcome: Ongoing (interventions implemented as appropriate)

## 2018-10-04 NOTE — PROGRESS NOTES
[facility] Medicine Services  PROGRESS NOTE    Patient Name: Carolyn Chaparro  : 1960  MRN: [id number]    Date of Admission: 10/1/2018  Length of Stay: 2  Primary Care Physician: Kimberlee Pinto MD    Subjective     CC: f/u coronary artery dissection    HPI:   In bed. Feels well and has no complaints. She denies chest pain or dyspnea. Wants to go home.     Review of Systems  Gen- No fevers, chills  CV- No chest pain, palpitations  Resp- No cough, dyspnea  GI- No N/V/D, abd pain    Otherwise ROS is negative except as mentioned in the HPI.    Objective     Vital Signs:   Temp:  [98 °F (36.7 °C)-99.1 °F (37.3 °C)] 98.3 °F (36.8 °C)  Heart Rate:  [54-95] 95  Resp:  [16] 16  BP: ()/(49-86) 103/63  Total (NIH Stroke Scale): 0     Physical Exam:  Constitutional: No acute distress, awake, alert  HENT: NCAT, mucous membranes moist  Respiratory: Clear to auscultation bilaterally, respiratory effort normal   Cardiovascular: RRR, no murmurs, rubs, or gallops, palpable pedal pulses bilaterally  Gastrointestinal: Positive bowel sounds, soft, nontender, nondistended  Musculoskeletal: No bilateral ankle edema  Psychiatric: Appropriate affect, cooperative  Neurologic: Oriented x 3, strength symmetric in all extremities, Cranial Nerves grossly intact to confrontation, speech clear  Skin: No rashes    Results Reviewed:  I have personally reviewed current lab, radiology, and data and agree.      Results from last 7 days  Lab Units 10/02/18  0605 10/01/18  2219   WBC 10*3/mm3 7.11 4.90   HEMOGLOBIN g/dL 12.6 13.7   HEMATOCRIT % 36.9 39.9   PLATELETS 10*3/mm3 243 245       Results from last 7 days  Lab Units 10/03/18  0629 10/02/18  0604 10/01/18  2219   SODIUM mmol/L 135 133 137   POTASSIUM mmol/L 3.6 4.5 4.1   CHLORIDE mmol/L 100 99 100   CO2 mmol/L 29.0 29.0 31.0   BUN mg/dL 7* 14 12   CREATININE mg/dL 0.76 0.65 0.83   GLUCOSE mg/dL 133* 108* 129*   CALCIUM mg/dL 9.0 8.9 9.0   ALT (SGPT)  U/L  --   --  34   AST (SGOT) U/L  --   --  34*   TROPONIN I ng/mL 49.814* 9.864*  --      Estimated Creatinine Clearance: 98.9 mL/min (by C-G formula based on SCr of 0.76 mg/dL).  BNP   Date Value Ref Range Status   10/01/2018 34.0 0.0 - 100.0 pg/mL Final     Comment:     Results may be falsely decreased if patient taking Biotin.       Microbiology Results Abnormal     None        CXR personally reviewed without acute chest disease. Agree with interpretation.     [facility] notable for coronary dissection.    Results for orders placed during the hospital encounter of 10/01/18   Adult Transthoracic Echo Complete W/ Cont if Necessary Per Protocol    Narrative · Left ventricular systolic function is normal. Estimated EF = 60%.  · Trace mitral and tricuspid regurgitation.        I have reviewed the medications.      amitriptyline 10 mg Oral Nightly   aspirin 325 mg Oral Daily   clopidogrel 75 mg Oral Daily   FLUoxetine 20 mg Oral Q PM   metoprolol succinate XL 50 mg Oral Q PM   pantoprazole 40 mg Oral Q AM   pharmacy consult - MTM  Does not apply Daily   polyethylene glycol 17 g Oral Daily   rosuvastatin 5 mg Oral Nightly   sodium chloride 3 mL Intravenous Q12H     Assessment / Plan     Hospital Problem List     * (Principal)NSTEMI (non-ST elevated myocardial infarction) (CMS/[facility])    Hypertension    GERD (gastroesophageal reflux disease)    Precordial chest pain    Coronary artery dissection        Brief Hospital Course to date:  Carolyn Chaparro is a 58 y.o. female admitted with chest pain and elevated troponin found to have a spontaneous coronary artery dissection.    Assessment & Plan:  - Dr. Allan following. Continue Plavix and Plavix   - Continue Metoprolol.   - CT abdomen/pelvis notable for 3.9cm ectasia of the ascending thoracic aorta   - OOB.    CODE STATUS:   Code Status and Medical Interventions:   Ordered at: 10/02/18 1212     Level Of Support Discussed With:    Patient     Code Status:    CPR      Medical Interventions (Level of Support Prior to Arrest):    Full     Disposition: I expect the patient to be discharged home tomorrow    Electronically signed by Bernice Claire PA-C, 10/04/18, 4:05 PM.

## 2018-10-04 NOTE — PLAN OF CARE
Problem: Patient Care Overview  Goal: Plan of Care Review  Outcome: Ongoing (interventions implemented as appropriate)   10/04/18 1446   OTHER   Outcome Summary Pt has no complaints at this time. VS stable. Expect to be d/c tomorrow. Will continue to monitor.     Goal: Individualization and Mutuality  Outcome: Ongoing (interventions implemented as appropriate)    Goal: Discharge Needs Assessment  Outcome: Ongoing (interventions implemented as appropriate)    Goal: Interprofessional Rounds/Family Conf  Outcome: Ongoing (interventions implemented as appropriate)    Goal: Plan of Care Review  Outcome: Ongoing (interventions implemented as appropriate)    Goal: Individualization and Mutuality  Outcome: Ongoing (interventions implemented as appropriate)    Goal: Discharge Needs Assessment  Outcome: Ongoing (interventions implemented as appropriate)    Goal: Interprofessional Rounds/Family Conf  Outcome: Ongoing (interventions implemented as appropriate)      Problem: Cardiac: ACS (Acute Coronary Syndrome) (Adult)  Goal: Signs and Symptoms of Listed Potential Problems Will be Absent, Minimized or Managed (Cardiac: ACS)  Outcome: Ongoing (interventions implemented as appropriate)      Problem: Pain, Acute (Adult)  Goal: Identify Related Risk Factors and Signs and Symptoms  Outcome: Ongoing (interventions implemented as appropriate)    Goal: Acceptable Pain Control/Comfort Level  Outcome: Ongoing (interventions implemented as appropriate)

## 2018-10-05 VITALS
HEART RATE: 67 BPM | TEMPERATURE: 98.1 F | RESPIRATION RATE: 18 BRPM | DIASTOLIC BLOOD PRESSURE: 70 MMHG | WEIGHT: 167 LBS | SYSTOLIC BLOOD PRESSURE: 114 MMHG | OXYGEN SATURATION: 94 % | HEIGHT: 68 IN | BODY MASS INDEX: 25.31 KG/M2

## 2018-10-05 PROCEDURE — 99232 SBSQ HOSP IP/OBS MODERATE 35: CPT | Performed by: INTERNAL MEDICINE

## 2018-10-05 PROCEDURE — 99238 HOSP IP/OBS DSCHRG MGMT 30/<: CPT | Performed by: PHYSICIAN ASSISTANT

## 2018-10-05 RX ORDER — CLOPIDOGREL BISULFATE 75 MG/1
75 TABLET ORAL DAILY
Qty: 30 TABLET | Refills: 2 | Status: SHIPPED | OUTPATIENT
Start: 2018-10-05 | End: 2019-07-17

## 2018-10-05 RX ORDER — ASPIRIN 81 MG/1
81 TABLET ORAL DAILY
Status: DISCONTINUED | OUTPATIENT
Start: 2018-10-05 | End: 2018-10-05 | Stop reason: HOSPADM

## 2018-10-05 RX ORDER — ROSUVASTATIN CALCIUM 5 MG/1
5 TABLET, COATED ORAL NIGHTLY
Qty: 30 TABLET | Refills: 11 | Status: SHIPPED | OUTPATIENT
Start: 2018-10-05 | End: 2019-10-22 | Stop reason: SDUPTHER

## 2018-10-05 RX ADMIN — CLOPIDOGREL BISULFATE 75 MG: 75 TABLET ORAL at 08:15

## 2018-10-05 RX ADMIN — ASPIRIN 81 MG: 81 TABLET, COATED ORAL at 08:15

## 2018-10-05 NOTE — PROGRESS NOTES
Case Management Discharge Note    Final Note: Spoke with patient at bedside regarding discharge plan.  Patient reports she is going home today and her  is coming to transport her home.  No discharge needs noted.  Patient plan is to discharge home today via car with family to transport.     Destination     No service has been selected for the patient.      Durable Medical Equipment     No service has been selected for the patient.      Dialysis/Infusion     No service has been selected for the patient.      Home Medical Care     No service has been selected for the patient.      Social Care     No service has been selected for the patient.             Final Discharge Disposition Code: 01 - home or self-care

## 2018-10-05 NOTE — DISCHARGE INSTR - APPOINTMENTS
Kimberlee Pinto MD  PCP - General  PCP - Family Medicine Family Medicine [phone] [address]  [facility] [address]   Next Steps: Follow up to see ANDREW Jacobo on Monday October 15th @ 1:00      Instructions: PCP follow up in 10-14 days

## 2018-10-05 NOTE — DISCHARGE SUMMARY
[facility] Hospital Medicine Services  DISCHARGE SUMMARY    Patient Name: Carolyn Chaparro  : 1960  MRN: [id number]    Date of Admission: 10/1/2018  Date of Discharge:  10/5/2018  Primary Care Physician: Kimberlee Pinto MD    Consults     Date and Time Order Name Status Description    10/2/2018 0520 Inpatient Cardiology Consult Completed         Hospital Course     Presenting Problem:   Precordial chest pain [R07.2]    Active Hospital Problems    Diagnosis Date Noted   • **NSTEMI (non-ST elevated myocardial infarction) (CMS/HCC) [I21.4] 10/02/2018   • Coronary artery dissection [I25.42] 10/03/2018   • GERD (gastroesophageal reflux disease) [K21.9] 10/02/2018   • Precordial chest pain [R07.2] 10/02/2018   • Hypertension [I10]       Resolved Hospital Problems    Diagnosis Date Noted Date Resolved   No resolved problems to display.      Hospital Course:  Ms. Luis M Chaparro is a 57yo female with PMH significant for HTN, GERD, anxiety and heart palpitations. She presented to [facility] ED on 10/1/18 with complaints of chest pain that developed around 2100. Patient reported that she was getting ready for bed when she developed midsternal chest pain and pressure with associated arm weakness. She described the pain as constant and uncomfortable. Chest pain relieved with nitropaste in the ED. Troponin elevated to 1.02. She was admitted to the hospital medicine service and cardiology was consulted.     LHC performed on 10/2 and revealed spontaneous dissection of the distal circumflex artery. Dr. Allan recommends Crestor 5mg QHS and Plavix for at least 3 months while the dissection heals. A CT chest was obtained to evaluate for possible aortic aneurysm. She was found to have a 3.9 cm aneurysm of the ascending thoracic aorta. This will be monitored routinely by cardiology.     Ms. Chaparro is improved. She will discharge home in stable condition on  10/5/18.    Day of Discharge     HPI:   Doing well, no further chest pain. She is anxious to return home. No new questions or concerns.     Review of Systems  Gen- No fevers, chills  CV- No chest pain, palpitations  Resp- No cough, dyspnea  GI- No N/V/D, abd pain    Otherwise ROS is negative except as mentioned in the HPI.    Vital Signs:   Temp:  [98.1 °F (36.7 °C)-99.1 °F (37.3 °C)] 98.1 °F (36.7 °C)  Heart Rate:  [60-95] 67  Resp:  [16-18] 18  BP: (101-114)/(62-84) 114/70     Physical Exam:  Constitutional: No acute distress, awake, alert  HENT: NCAT, mucous membranes moist  Respiratory: Clear to auscultation bilaterally, respiratory effort normal   Cardiovascular: RRR, no murmurs, rubs, or gallops, palpable pedal pulses bilaterally  Gastrointestinal: Positive bowel sounds, soft, nontender, nondistended  Musculoskeletal: No bilateral ankle edema  Psychiatric: Appropriate affect, cooperative  Neurologic: Oriented x 3, strength symmetric in all extremities, Cranial Nerves grossly intact to confrontation, speech clear  Skin: No rashes    Pertinent  and/or Most Recent Results       Results from last 7 days  Lab Units 10/03/18  0629 10/02/18  0605 10/02/18  0604 10/01/18  2219   WBC 10*3/mm3  --  7.11  --  4.90   HEMOGLOBIN g/dL  --  12.6  --  13.7   HEMATOCRIT %  --  36.9  --  39.9   PLATELETS 10*3/mm3  --  243  --  245   SODIUM mmol/L 135  --  133 137   POTASSIUM mmol/L 3.6  --  4.5 4.1   CHLORIDE mmol/L 100  --  99 100   CO2 mmol/L 29.0  --  29.0 31.0   BUN mg/dL 7*  --  14 12   CREATININE mg/dL 0.76  --  0.65 0.83   GLUCOSE mg/dL 133*  --  108* 129*   CALCIUM mg/dL 9.0  --  8.9 9.0       Results from last 7 days  Lab Units 10/01/18  2219   BILIRUBIN mg/dL 0.4   ALK PHOS U/L 75   ALT (SGPT) U/L 34   AST (SGOT) U/L 34*       Results from last 7 days  Lab Units 10/02/18  0604   CHOLESTEROL mg/dL 153   TRIGLYCERIDES mg/dL 126   HDL CHOL mg/dL 41       Results from last 7 days  Lab Units 10/03/18  0629 10/02/18  0605  10/02/18  0604 10/01/18  2219   HEMOGLOBIN A1C %  --  5.10  --   --    BNP pg/mL  --   --   --  34.0   TROPONIN I ng/mL 49.814*  --  9.864*  --      Brief Urine Lab Results     None        Microbiology Results Abnormal     None        Imaging Results (all)     Procedure Component Value Units Date/Time    CT Angiogram Chest With & Without Contrast [[id number]] Collected:  10/03/18 1834     Updated:  10/03/18 2215    Narrative:       EXAMINATION: CT ANGIOGRAM CHEST WWO CONTRAST, CT ANGIOGRAM ABDOMEN  PELVIS WWO CONTRAST - 10/03/2018     INDICATION: Thoracic aortic aneurysm.     TECHNIQUE: Unenhanced 5 mm axial images through the chest, abdomen and  pelvis and subsequent 3 mm post-IV contrast arterial phase images with  sagittal and coronal 2D reconstructions and 3D VRT and MIP angiographic  reconstructions.     The radiation dose reduction device was turned on for each scan per the  ALARA (As Low as Reasonably Achievable) protocol.     COMPARISON: None.     FINDINGS: Patient history indicates non-ST elevated myocardial  infarction, spontaneous coronary artery dissection, evaluate for  thoracic aortic dissection.     Unenhanced images show no evidence of intramural hematoma. Postcontrast  images show the thoracic aorta to appear grossly normal. There is the  usual motion artifact at the root of the aorta, but allowing for this,  no consistent abnormality to suggest dissection. Maximal diameter of the  ascending aorta is approximately 3.9 cm. The aorta decreases to  approximately 2.5 cm at the level of the proximal descending aorta. The  remainder of the thoracic aorta and abdominal aorta appears  unremarkable. Iliac vessels appear unremarkable.     Elsewhere, the study is sufficient to exclude all but very small  pulmonary emboli. No pericardial or pleural effusion or mediastinal  adenopathy is seen. Lungs appear clear except for granulomatous  calcifications.     Below the diaphragm, there is diffuse fatty liver  change. No significant  abnormalities are seen of the gallbladder, pancreas, adrenal glands, or  kidneys. Spleen is normal in size and contains multiple granulomatous  calcifications. No free air, ascites, adenopathy or acute inflammatory  focus is seen. Bowel loops are normal in caliber. Bladder is  nondistended and normal in appearance.       Impression:       1. Generalized ectasia of the ascending thoracic aorta to a maximum of  approximately 3.9 cm. Allowing for cardiac motion artifact, no evidence  of dissection.  2. No other evidence of active chest, abdominal or pelvic disease  elsewhere.     DICTATED:   10/03/2018  EDITED/ls :   10/03/2018      This report was finalized on 10/3/2018 10:13 PM by DR. Gamal Ramirez MD.       CT Angiogram Abdomen Pelvis With & Without Contrast [[id number]] Collected:  10/03/18 1834     Updated:  10/03/18 2215    Narrative:       EXAMINATION: CT ANGIOGRAM CHEST WWO CONTRAST, CT ANGIOGRAM ABDOMEN  PELVIS WWO CONTRAST - 10/03/2018     INDICATION: Thoracic aortic aneurysm.     TECHNIQUE: Unenhanced 5 mm axial images through the chest, abdomen and  pelvis and subsequent 3 mm post-IV contrast arterial phase images with  sagittal and coronal 2D reconstructions and 3D VRT and MIP angiographic  reconstructions.     The radiation dose reduction device was turned on for each scan per the  ALARA (As Low as Reasonably Achievable) protocol.     COMPARISON: None.     FINDINGS: Patient history indicates non-ST elevated myocardial  infarction, spontaneous coronary artery dissection, evaluate for  thoracic aortic dissection.     Unenhanced images show no evidence of intramural hematoma. Postcontrast  images show the thoracic aorta to appear grossly normal. There is the  usual motion artifact at the root of the aorta, but allowing for this,  no consistent abnormality to suggest dissection. Maximal diameter of the  ascending aorta is approximately 3.9 cm. The aorta decreases to  approximately 2.5 cm  at the level of the proximal descending aorta. The  remainder of the thoracic aorta and abdominal aorta appears  unremarkable. Iliac vessels appear unremarkable.     Elsewhere, the study is sufficient to exclude all but very small  pulmonary emboli. No pericardial or pleural effusion or mediastinal  adenopathy is seen. Lungs appear clear except for granulomatous  calcifications.     Below the diaphragm, there is diffuse fatty liver change. No significant  abnormalities are seen of the gallbladder, pancreas, adrenal glands, or  kidneys. Spleen is normal in size and contains multiple granulomatous  calcifications. No free air, ascites, adenopathy or acute inflammatory  focus is seen. Bowel loops are normal in caliber. Bladder is  nondistended and normal in appearance.       Impression:       1. Generalized ectasia of the ascending thoracic aorta to a maximum of  approximately 3.9 cm. Allowing for cardiac motion artifact, no evidence  of dissection.  2. No other evidence of active chest, abdominal or pelvic disease  elsewhere.     DICTATED:   10/03/2018  EDITED/ls :   10/03/2018      This report was finalized on 10/3/2018 10:13 PM by DR. Gamal Ramirez MD.       XR Chest 1 View [[id number]] Collected:  10/01/18 2134     Updated:  10/01/18 2314    Narrative:       EXAM:    XR Chest, 1 View    CLINICAL HISTORY:    58 years old, female; Pain; Chest pain; Additional info: Chest pain triage   protocol    TECHNIQUE:    Frontal view of the chest.    COMPARISON:    No relevant prior studies available.    FINDINGS:    Lungs:  No acute infiltrates.  No pneumonia or CHF.    Pleural space:  No pleural effusion or pneumothorax.    Heart:  Normal heart size.    Mediastinum:  No acute mediastinal abnormality visualized.    Bones/joints:  Minimal scoliotic curvature, positional versus fixed.  No   acute abnormality seen along the well-visualized osseous structures.    Vasculature:  Mild aortic tortuosity.      Impression:         No acute  cardiopulmonary disease demonstrated.    THIS DOCUMENT HAS BEEN ELECTRONICALLY SIGNED BY NICKI BONILLA MD        Results for orders placed during the hospital encounter of 10/01/18   Adult Transthoracic Echo Complete W/ Cont if Necessary Per Protocol    Narrative · Left ventricular systolic function is normal. Estimated EF = 60%.  · Trace mitral and tricuspid regurgitation.        Discharge Details        Discharge Medications      New Medications      Instructions Start Date   clopidogrel 75 MG tablet  Commonly known as:  PLAVIX  Notes to patient:  prevent clots   75 mg, Oral, Daily      rosuvastatin 5 MG tablet  Commonly known as:  CRESTOR  Notes to patient:  Lower cholesterol, prevent plaque   5 mg, Oral, Nightly         Continue These Medications      Instructions Start Date   amitriptyline 10 MG tablet  Commonly known as:  ELAVIL   10 mg, Oral, Nightly      aspirin 81 MG EC tablet   81 mg, Oral, Daily      cetirizine 10 MG tablet  Commonly known as:  zyrTEC   10 mg, Oral, Nightly      cholecalciferol 1000 units tablet  Commonly known as:  VITAMIN D3   1,000 Units, Oral, Daily      FLUoxetine 20 MG capsule  Commonly known as:  PROzac   20 mg, Oral, Nightly      metoprolol succinate XL 25 MG 24 hr tablet  Commonly known as:  TOPROL-XL   25 mg, Oral, Nightly      MULTIVITAMIN ADULT PO   1 tablet, Oral, Daily      naproxen sodium 220 MG tablet  Commonly known as:  ALEVE   220 mg, Oral, Nightly, Back Pain      pantoprazole 40 MG EC tablet  Commonly known as:  PROTONIX   40 mg, Oral, Daily      PROBIOTIC ADVANCED PO   1 tablet, Oral, Daily      vitamin B-12 1000 MCG tablet  Commonly known as:  CYANOCOBALAMIN   1,000 mcg, Oral, Daily           Discharge Disposition:  Home or Self Care    Discharge Diet:  Diet Instructions     Diet: Regular, Cardiac; Thin       Discharge Diet:   Regular  Cardiac       Fluid Consistency:  Thin        Discharge Activity:   Activity Instructions     Activity as Tolerated           Code  Status/Level of Support:  Code Status and Medical Interventions:   Ordered at: 10/02/18 1212     Level Of Support Discussed With:    Patient     Code Status:    CPR     Medical Interventions (Level of Support Prior to Arrest):    Full     Future Appointments  Date Time Provider Department Center   10/24/2018 1:30 PM ORIENTATION -  [facility]     Additional Instructions for the Follow-ups that You Need to Schedule     Ambulatory Referral to Cardiac Rehab    As directed      Discharge Follow-up with PCP    As directed      Currently Documented PCP:  Kimberlee Pinto MD  PCP Phone Number:  [phone]    Follow Up Details:  PCP follow up in 10-14 days         Discharge Follow-up with Specified Provider: Follow up with Dr. Allan in 6 weeks    As directed      To:  Follow up with Dr. Allan in 6 weeks             Time Spent on Discharge:  30 minutes    Electronically signed by Bernice Claire PA-C, 10/05/18, 9:02 AM.

## 2018-10-05 NOTE — PROGRESS NOTES
"[facility] Discharge Note       LOS: 3 days   Patient Care Team:  Kimberlee Pinto MD as PCP - General  Kimberlee Pinto MD as PCP - Family Medicine  GhislaineReed escobedo MD as Consulting Physician (Gynecology)    Chief Complaint:  Non-ST elevation MI/chest pain    Subjective     Subjective: No complaints of chest pain or dyspnea this morning.  Overall feels good.  Would like to go home.  Works as a  for a large company.  Would like to go back to work as soon as possible.  No physical activity involved.    Review of Systems:   As above.    Medications:    amitriptyline 10 mg Oral Nightly   aspirin 325 mg Oral Daily   clopidogrel 75 mg Oral Daily   FLUoxetine 20 mg Oral Q PM   metoprolol succinate XL 50 mg Oral Q PM   pantoprazole 40 mg Oral Q AM   pharmacy consult - MTM  Does not apply Daily   polyethylene glycol 17 g Oral Daily   rosuvastatin 5 mg Oral Nightly   sodium chloride 3 mL Intravenous Q12H       Objective     Vital Sign Min/Max for last 24 hours  Temp  Min: 98.2 °F (36.8 °C)  Max: 99.1 °F (37.3 °C)   BP  Min: 96/49  Max: 108/63   Pulse  Min: 60  Max: 95   Resp  Min: 16  Max: 16   SpO2  Min: 90 %  Max: 93 %   No Data Recorded   No Data Recorded      Intake/Output Summary (Last 24 hours) at 10/05/18 0728  Last data filed at 10/04/18 1700   Gross per 24 hour   Intake              720 ml   Output                0 ml   Net              720 ml        Flowsheet Rows      First Filed Value   Admission Height  172.7 cm (68\") Documented at 10/01/2018 2131   Admission Weight  79.4 kg (175 lb) Documented at 10/01/2018 2131          Physical Exam:    General: Alert and oriented   Cardiovascular: Heart has a nondisplaced focal PMI. Regular rate and rhythm without murmur, gallop or rub.  Lungs: Clear without rales or wheezes. Equal expansion is noted.   Extremities: Show no edema.  Skin: warm and dry.  Neurologic: nonfocal     Results Review:    I reviewed the patient's new clinical " results.    Tele:  NSR    Labs:      Results from last 7 days  Lab Units 10/03/18  0629 10/02/18  0604 10/01/18  2219   SODIUM mmol/L 135 133 137   POTASSIUM mmol/L 3.6 4.5 4.1   CHLORIDE mmol/L 100 99 100   CO2 mmol/L 29.0 29.0 31.0   BUN mg/dL 7* 14 12   CREATININE mg/dL 0.76 0.65 0.83   CALCIUM mg/dL 9.0 8.9 9.0   BILIRUBIN mg/dL  --   --  0.4   ALK PHOS U/L  --   --  75   ALT (SGPT) U/L  --   --  34   AST (SGOT) U/L  --   --  34*   GLUCOSE mg/dL 133* 108* 129*       Results from last 7 days  Lab Units 10/02/18  0605 10/01/18  2219   WBC 10*3/mm3 7.11 4.90   HEMOGLOBIN g/dL 12.6 13.7   HEMATOCRIT % 36.9 39.9   PLATELETS 10*3/mm3 243 245     Lab Results   Component Value Date    TROPONINI 49.814 (C) 10/03/2018    TROPONINI 9.864 (C) 10/02/2018     Lab Results   Component Value Date    CHOL 153 10/02/2018     Lab Results   Component Value Date    TRIG 126 10/02/2018     Lab Results   Component Value Date    HDL 41 10/02/2018         LDL   104      10/02/2018    CT chest/ abdomen:  Generalized ectasia of ascending thoracic aorta to a max of 3.9cm.      Ejection Fraction:  normal    Assessment   Assessment:  1.  NSTEMI, status post spontaneous dissection of the distal circumflex OM 2.   2.  Hypertension  3.  Palpitation hx  4.  GERD  5.  Hyperlipidemia  6.  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        Plan:    Home today  The patient is stable, appropriate for discharge home, and follow-up has been arranged.  Discharge medications will be Elavil 10 mg by mouth daily at bedtime, aspirin 81 mg by mouth daily, Plavix 75 mg by mouth daily, Prozac 20 mg by mouth daily, metoprolol succinate 50 mg daily, Protonix 40 mg daily, Crestor 5 mg daily, cetirizine 10 mg by mouth daily day, vitamin D3 1000 units daily, vitamin B12 1000 µg daily, multivitamin daily, probiotic daily  Follow-up with me in 6 weeks      Hellen Allan MD  10/05/18  7:28 AM

## 2018-10-05 NOTE — PROGRESS NOTES
Continued Stay Note  [facility]     Patient Name: Carolyn Chaparro  MRN: [id number]  Today's Date: 10/5/2018    Admit Date: 10/1/2018    Consent obtained for the participation in the [facility]. Agueda Tapia RN        Discharge Plan    No documentation.             Discharge Codes    No documentation.       Expected Discharge Date and Time     Expected Discharge Date Expected Discharge Time    Oct 5, 2018             Agueda Tapia RN

## 2018-10-05 NOTE — PLAN OF CARE
Problem: Patient Care Overview  Goal: Plan of Care Review  Outcome: Ongoing (interventions implemented as appropriate)   10/05/18 0340   Coping/Psychosocial   Plan of Care Reviewed With patient   Plan of Care Review   Progress no change   OTHER   Outcome Summary patient rested this shift. Vital signs stable. SBP has remained greater then 100, and patient has remained normal sinus rhythm. no c/o chest pain this shift. will continue to monitor.        Problem: Cardiac: ACS (Acute Coronary Syndrome) (Adult)  Goal: Signs and Symptoms of Listed Potential Problems Will be Absent, Minimized or Managed (Cardiac: ACS)  Outcome: Ongoing (interventions implemented as appropriate)

## 2018-10-06 ENCOUNTER — READMISSION MANAGEMENT (OUTPATIENT)
Dept: CALL CENTER | Facility: HOSPITAL | Age: 58
End: 2018-10-06

## 2018-10-06 NOTE — OUTREACH NOTE
Prep Survey      Responses   Facility patient discharged from?  [facility]   Is patient eligible?  Yes   Discharge diagnosis  NSTEMI, spontaneous dissection of coronary artery,    Does the patient have one of the following disease processes/diagnoses(primary or secondary)?  Acute MI (STEMI,NSTEMI)   Does the patient have Home health ordered?  No   Is there a DME ordered?  No   Comments regarding appointments  Cardiac Rehab October 24 11:30am for initial evaluation, f/u with Joey. Cards. and PCP, Kimberlee Pinto   Medication alerts for this patient  Start Plavix and rosuvastatin (CRESTOR)   General alerts for this patient  Lives with    Prep survey completed?  Yes          Candace Dawson RN

## 2018-10-08 ENCOUNTER — READMISSION MANAGEMENT (OUTPATIENT)
Dept: CALL CENTER | Facility: HOSPITAL | Age: 58
End: 2018-10-08

## 2018-10-08 NOTE — OUTREACH NOTE
AMI Week 1 Survey      Responses   Facility patient discharged from?  [facility]   Does the patient have one of the following disease processes/diagnoses(primary or secondary)?  Acute MI (STEMI,NSTEMI)   Is there a successful TCM telephone encounter documented?  No   Week 1 attempt successful?  Yes   Call start time  1359   Call end time  1402   Discharge diagnosis  NSTEMI, spontaneous dissection of coronary artery,    Meds reviewed with patient/caregiver?  Yes   Is the patient having any side effects they believe may be caused by any medication additions or changes?  No   Does the patient have all prescriptions related to this admission filled (includes statins,anticoagulants,HTN meds,anti-arrhythmia meds)  Yes   Is the patient taking all medications as directed (includes completed medication regime)?  Yes   Does the patient have a primary care provider?   Yes   Does the patient have an appointment with their PCP,cardiologist,or clinic within 7 days of discharge?  Yes   Has the patient kept scheduled appointments due by today?  Yes   Has home health visited the patient within 72 hours of discharge?  N/A   Psychosocial issues?  No   Did the patient receive a copy of their discharge instructions?  Yes   Nursing interventions  Reviewed instructions with patient   What is the patient's perception of their health status since discharge?  Improving   Nursing interventions  Nurse provided patient education   Is the patient/caregiver able to teach back signs and symptoms of when to call for help immediately:  Dizziness or lightheadedness, Shortness of breath at any time, Sudden sweating or clammy skin, Irregular or rapid heart rate, Sudden discomfort in arms, back, neck or jaw, Sudden chest discomfort, Nausea or vomiting   Nursing interventions  Nurse provided patient education   Is the pateint /caregiver able to teach back the importance of cardiac rehab?  Yes   Nursing interventions  Provided education on importance of  cardiac rehab   Is the patient/caregiver able to teach back ways to prevent a second heart attack:  Manage risk factors   Is the patient/caregiver able to teach back the hierarchy of who to call/visit for symptoms/problems? PCP, Specialist, Home health nurse, Urgent Care, ED, 911  Yes   Week 1 call completed?  Yes          Cal Wyatt RN

## 2018-10-17 ENCOUNTER — READMISSION MANAGEMENT (OUTPATIENT)
Dept: CALL CENTER | Facility: HOSPITAL | Age: 58
End: 2018-10-17

## 2018-10-17 NOTE — OUTREACH NOTE
AMI Week 2 Survey      Responses   Facility patient discharged from?  [facility]   Does the patient have one of the following disease processes/diagnoses(primary or secondary)?  Acute MI (STEMI,NSTEMI)   Week 2 attempt successful?  No   Unsuccessful attempts  Attempt 1          Oxana Rizo RN

## 2018-10-19 ENCOUNTER — READMISSION MANAGEMENT (OUTPATIENT)
Dept: CALL CENTER | Facility: HOSPITAL | Age: 58
End: 2018-10-19

## 2018-10-19 NOTE — OUTREACH NOTE
AMI Week 2 Survey      Responses   Facility patient discharged from?  [facility]   Does the patient have one of the following disease processes/diagnoses(primary or secondary)?  Acute MI (STEMI,NSTEMI)   Week 2 attempt successful?  Yes   Call start time  1615   Call end time  1640   Discharge diagnosis  NSTEMI, spontaneous dissection of coronary artery,    Meds reviewed with patient/caregiver?  Yes   Is the patient having any side effects they believe may be caused by any medication additions or changes?  No   Does the patient have all prescriptions related to this admission filled (includes statins,anticoagulants,HTN meds,anti-arrhythmia meds)  Yes   Is the patient taking all medications as directed (includes completed medication regime)?  Yes   Does the patient have a primary care provider?   Yes   Comments regarding PCP  Pt is looking for a new PCP. Info given on  Med group.    Has the patient kept scheduled appointments due by today?  Yes   Psychosocial issues?  No   Comments  right wrist- LHC bruising is fading, denies pain. Pt has been waking with HA on occas. BP is WNL since DC per her report. She is concerned about connective tissue disease and is interested in further testing since she reports strong family h/o aortic issues, increase height, I recommended calling Dr Allan office to inquire about further testing as her PCP did not discuss further with pt despite her concerns.  She is interested in seeing .    What is the patient's perception of their health status since discharge?  Improving   Nursing interventions  Nurse provided patient education   Is the patient/caregiver able to teach back signs and symptoms of when to call for help immediately:  Sudden chest discomfort, Shortness of breath at any time, Sudden discomfort in arms, back, neck or jaw, Dizziness or lightheadedness, Sudden sweating or clammy skin, Nausea or vomiting   Nursing interventions  Nurse provided patient education    Is the pateint /caregiver able to teach back the importance of cardiac rehab?  Yes   Nursing interventions  Provided education on importance of cardiac rehab   Is the patient/caregiver able to teach back lifestyle changes to help prevent MIs  Heart healthy diet, Regular exercise as approved by provider   Is the patient/caregiver able to teach back ways to prevent a second heart attack:  Take medications, Follow up with MD, Participate in Cardiac Rehab   Is the patient/caregiver able to teach back the hierarchy of who to call/visit for symptoms/problems? PCP, Specialist, Home health nurse, Urgent Care, ED, 911  Yes   Week 2 call completed?  Yes          Lily Miles, RN

## 2018-10-24 ENCOUNTER — TREATMENT (OUTPATIENT)
Dept: CARDIAC REHAB | Facility: HOSPITAL | Age: 58
End: 2018-10-24

## 2018-10-24 DIAGNOSIS — I21.4 NSTEMI (NON-ST ELEVATED MYOCARDIAL INFARCTION) (HCC): ICD-10-CM

## 2018-10-24 PROCEDURE — 93798 PHYS/QHP OP CAR RHAB W/ECG: CPT

## 2018-10-24 NOTE — PROGRESS NOTES
Cardiac Rehab Initial Assessment      Name: Carolyn Chaparro  :1960 Allergies:Patient has no known allergies.   MRN: [id number] 58 y.o. Physician: Kimberlee Pinto MD   Primary Diagnosis:    Diagnosis Plan   1. NSTEMI (non-ST elevated myocardial infarction) (CMS/HCC)  Cardiac Rehab Phase II    Event Date: 10/2/2018 Specialist: Sanjana   Secondary Diagnosis: none   Risk Stratification:High Risk Note Author: Lizeth Cho RN     Cardiovascular History: HTN       EXERCISE AT HOME  no    N/A    EF: 60%      Source: Echo 10/2/18          Ambulatory Status:Independent  Ambulatory Fall Risk Assessed on Initial Visit: yes 6 Minute Walk Pre- Cardiac Rehab:  Distance:1920ft      RPE:11  Max. HR: 89       SPO2:99    MET: 3.7  MPH:              RPD:   Resting BP: 110/70 LA, 112/70 RA    Peak BP: 116/78  Recovery BP: 110/76  Comments:       NUTRITION  Lipids:yes If yes, labs as follows;  Total: No components found for: CHOLESTEROL  HDL:   HDL Cholesterol   Date Value Ref Range Status   10/02/2018 41 40 - 60 mg/dL Final    Lipids continued:  LDL:  LDL Cholesterol    Date Value Ref Range Status   10/02/2018 104 0 - 130 mg/dL Final     Triglyceride: No components found for: TRIGLYCERIDE   Weight Management:                 Weight: 180.4  Height: 68                                   BMI: 27.4  Waist Circumference:   inches   Alcohol Use: none Diabetes:No    Last HGBA1C with date if applicable:No components found for: A1C         SOCIAL HISTORY  Social History     Social History   • Marital status:      Social History Main Topics   • Smoking status: Never Smoker   • Smokeless tobacco: Never Used   • Alcohol use No   • Drug use: No   • Sexual activity: Yes     Partners: Male     Birth control/ protection: Surgical, Post-menopausal     Other Topics Concern   • Not on file       Educational Level (choose one that applies) college graduate Learning Barriers:Ready to Learn    Family Support:yes    Living  Arrangement: lives with their spouse    Risk Factors: HTN     Tobacco Adjunct: No        Comorbidities: na     PSYCHOSOCIAL  Clinical Depression: no    Stress: yes     Assess presence or absence of depression using a valid screening tool: yes      PHYSICAL ASSESSMENT  Influenza vaccine: yes  Pneumococcal vaccine: no          Angina: no    Describe angina scale of 0 - 4: 0 = none    Today are you having incisional pain? No. If, Yes, Scale: NA        Today are you having any other pain? No. If, Yes, Scale: NA     Diagnosed with Hypertension:yes    Heart Sounds: S1, S2, Regular     Lung Sounds: normal air entry, lungs clear to auscultation         Assessment:  Orthopedic Problems: Left foot pain from recent fall after tripping over piece of furniture    Are you being hurt, hit, or frightened by anyone at home or in your life? no    Are you being neglected by a caregiver? N/A Shoulder flexibility/Range of motion: Average     Recommended arm activity: Any    Chair sit and reach within: 0 inches   Leg flexibility: Average    Leg Strength/Balance/Five times sit to stand: 0 seconds.     Chose one: Average    Recommended stretching: Standing    Assessment:     Family attends IA: no Time of arrival: 1300  Time of departure: 1430     Patient Goals: 1. Exercise a total of 150 min/wk between cardiac rehab and home exercise by end of program to work toward a healthy lifestyle  2. Strength training by discharge.          10/24/2018  2:30 PM  Lizeth Cho, [facility] Phase II Cardiac Rehab. No medication or health history changes reported. See [facility] for details.

## 2018-10-24 NOTE — PATIENT INSTRUCTIONS
Stress and Stress Management  Stress is a normal reaction to life events. It is what you feel when life demands more than you are used to or more than you can handle. Some stress can be useful. For example, the stress reaction can help you catch the last bus of the day, study for a test, or meet a deadline at work. But stress that occurs too often or for too long can cause problems. It can affect your emotional health and interfere with relationships and normal daily activities. Too much stress can weaken your immune system and increase your risk for physical illness. If you already have a medical problem, stress can make it worse.  What are the causes?  All sorts of life events may cause stress. An event that causes stress for one person may not be stressful for another person. Major life events commonly cause stress. These may be positive or negative. Examples include losing your job, moving into a new home, getting , having a baby, or losing a loved one. Less obvious life events may also cause stress, especially if they occur day after day or in combination. Examples include working long hours, driving in traffic, caring for children, being in debt, or being in a difficult relationship.  What are the signs or symptoms?  Stress may cause emotional symptoms including, the following:  · Anxiety. This is feeling worried, afraid, on edge, overwhelmed, or out of control.  · Anger. This is feeling irritated or impatient.  · Depression. This is feeling sad, down, helpless, or guilty.  · Difficulty focusing, remembering, or making decisions.    Stress may cause physical symptoms, including the following:  · Aches and pains. These may affect your head, neck, back, stomach, or other areas of your body.  · Tight muscles or clenched jaw.  · Low energy or trouble sleeping.    Stress may cause unhealthy behaviors, including the following:  · Eating to feel better (overeating) or skipping meals.  · Sleeping too little,  too much, or both.  · Working too much or putting off tasks (procrastination).  · Smoking, drinking alcohol, or using drugs to feel better.    How is this diagnosed?  Stress is diagnosed through an assessment by your health care provider. Your health care provider will ask questions about your symptoms and any stressful life events. Your health care provider will also ask about your medical history and may order blood tests or other tests. Certain medical conditions and medicine can cause physical symptoms similar to stress. Mental illness can cause emotional symptoms and unhealthy behaviors similar to stress. Your health care provider may refer you to a mental health professional for further evaluation.  How is this treated?  Stress management is the recommended treatment for stress. The goals of stress management are reducing stressful life events and coping with stress in healthy ways.  Techniques for reducing stressful life events include the following:  · Stress identification. Self-monitor for stress and identify what causes stress for you. These skills may help you to avoid some stressful events.  · Time management. Set your priorities, keep a calendar of events, and learn to say “no.” These tools can help you avoid making too many commitments.    Techniques for coping with stress include the following:  · Rethinking the problem. Try to think realistically about stressful events rather than ignoring them or overreacting. Try to find the positives in a stressful situation rather than focusing on the negatives.  · Exercise. Physical exercise can release both physical and emotional tension. The key is to find a form of exercise you enjoy and do it regularly.  · Relaxation techniques. These relax the body and mind. Examples include yoga, meditation, lyly chi, biofeedback, deep breathing, progressive muscle relaxation, listening to music, being out in nature, journaling, and other hobbies. Again, the key is to find  one or more that you enjoy and can do regularly.  · Healthy lifestyle. Eat a balanced diet, get plenty of sleep, and do not smoke. Avoid using alcohol or drugs to relax.  · Strong support network. Spend time with family, friends, or other people you enjoy being around. Express your feelings and talk things over with someone you trust.    Counseling or talktherapy with a mental health professional may be helpful if you are having difficulty managing stress on your own. Medicine is typically not recommended for the treatment of stress. Talk to your health care provider if you think you need medicine for symptoms of stress.  Follow these instructions at home:  · Keep all follow-up visits as directed by your health care provider.  · Take all medicines as directed by your health care provider.  Contact a health care provider if:  · Your symptoms get worse or you start having new symptoms.  · You feel overwhelmed by your problems and can no longer manage them on your own.  Get help right away if:  · You feel like hurting yourself or someone else.  This information is not intended to replace advice given to you by your health care provider. Make sure you discuss any questions you have with your health care provider.  Document Released: 06/13/2002 Document Revised: 05/25/2017 Document Reviewed: 08/12/2014  [employer] Interactive Patient Education © 2017 [employer]    Fall Prevention in the Home  Falls can cause injuries. They can happen to people of all ages. There are many things you can do to make your home safe and to help prevent falls.  What can I do on the outside of my home?  · Regularly fix the edges of walkways and driveways and fix any cracks.  · Remove anything that might make you trip as you walk through a door, such as a raised step or threshold.  · Trim any bushes or trees on the path to your home.  · Use bright outdoor lighting.  · Clear any walking paths of anything that might make someone trip, such as  rocks or tools.  · Regularly check to see if handrails are loose or broken. Make sure that both sides of any steps have handrails.  · Any raised decks and porches should have guardrails on the edges.  · Have any leaves, snow, or ice cleared regularly.  · Use sand or salt on walking paths during winter.  · Clean up any spills in your garage right away. This includes oil or grease spills.  What can I do in the bathroom?  · Use night lights.  · Install grab bars by the toilet and in the tub and shower. Do not use towel bars as grab bars.  · Use non-skid mats or decals in the tub or shower.  · If you need to sit down in the shower, use a plastic, non-slip stool.  · Keep the floor dry. Clean up any water that spills on the floor as soon as it happens.  · Remove soap buildup in the tub or shower regularly.  · Attach bath mats securely with double-sided non-slip rug tape.  · Do not have throw rugs and other things on the floor that can make you trip.  What can I do in the bedroom?  · Use night lights.  · Make sure that you have a light by your bed that is easy to reach.  · Do not use any sheets or blankets that are too big for your bed. They should not hang down onto the floor.  · Have a firm chair that has side arms. You can use this for support while you get dressed.  · Do not have throw rugs and other things on the floor that can make you trip.  What can I do in the kitchen?  · Clean up any spills right away.  · Avoid walking on wet floors.  · Keep items that you use a lot in easy-to-reach places.  · If you need to reach something above you, use a strong step stool that has a grab bar.  · Keep electrical cords out of the way.  · Do not use floor polish or wax that makes floors slippery. If you must use wax, use non-skid floor wax.  · Do not have throw rugs and other things on the floor that can make you trip.  What can I do with my stairs?  · Do not leave any items on the stairs.  · Make sure that there are handrails on  both sides of the stairs and use them. Fix handrails that are broken or loose. Make sure that handrails are as long as the stairways.  · Check any carpeting to make sure that it is firmly attached to the stairs. Fix any carpet that is loose or worn.  · Avoid having throw rugs at the top or bottom of the stairs. If you do have throw rugs, attach them to the floor with carpet tape.  · Make sure that you have a light switch at the top of the stairs and the bottom of the stairs. If you do not have them, ask someone to add them for you.  What else can I do to help prevent falls?  · Wear shoes that:  ? Do not have high heels.  ? Have rubber bottoms.  ? Are comfortable and fit you well.  ? Are closed at the toe. Do not wear sandals.  · If you use a stepladder:  ? Make sure that it is fully opened. Do not climb a closed stepladder.  ? Make sure that both sides of the stepladder are locked into place.  ? Ask someone to hold it for you, if possible.  · Clearly maria r and make sure that you can see:  ? Any grab bars or handrails.  ? First and last steps.  ? Where the edge of each step is.  · Use tools that help you move around (mobility aids) if they are needed. These include:  ? Canes.  ? Walkers.  ? Scooters.  ? Crutches.  · Turn on the lights when you go into a dark area. Replace any light bulbs as soon as they burn out.  · Set up your furniture so you have a clear path. Avoid moving your furniture around.  · If any of your floors are uneven, fix them.  · If there are any pets around you, be aware of where they are.  · Review your medicines with your doctor. Some medicines can make you feel dizzy. This can increase your chance of falling.  Ask your doctor what other things that you can do to help prevent falls.  This information is not intended to replace advice given to you by your health care provider. Make sure you discuss any questions you have with your health care provider.  Document Released: 10/14/2010 Document  Revised: 05/25/2017 Document Reviewed: 01/22/2016  [employer] Interactive Patient Education © 2018 [employer]

## 2018-10-26 ENCOUNTER — TREATMENT (OUTPATIENT)
Dept: CARDIAC REHAB | Facility: HOSPITAL | Age: 58
End: 2018-10-26

## 2018-10-26 DIAGNOSIS — I21.4 NSTEMI (NON-ST ELEVATED MYOCARDIAL INFARCTION) (HCC): Primary | ICD-10-CM

## 2018-10-26 PROCEDURE — 93798 PHYS/QHP OP CAR RHAB W/ECG: CPT

## 2018-10-29 ENCOUNTER — TREATMENT (OUTPATIENT)
Dept: CARDIAC REHAB | Facility: HOSPITAL | Age: 58
End: 2018-10-29

## 2018-10-29 ENCOUNTER — READMISSION MANAGEMENT (OUTPATIENT)
Dept: CALL CENTER | Facility: HOSPITAL | Age: 58
End: 2018-10-29

## 2018-10-29 DIAGNOSIS — I21.4 NSTEMI (NON-ST ELEVATED MYOCARDIAL INFARCTION) (HCC): Primary | ICD-10-CM

## 2018-10-29 PROCEDURE — 93798 PHYS/QHP OP CAR RHAB W/ECG: CPT

## 2018-10-29 NOTE — OUTREACH NOTE
AMI Week 3 Survey      Responses   Facility patient discharged from?  [place]   Does the patient have one of the following disease processes/diagnoses(primary or secondary)?  Acute MI (STEMI,NSTEMI)   Week 3 attempt successful?  Yes   Call start time  1049   Call end time  1054   General alerts for this patient  Lives with    Discharge diagnosis  NSTEMI, spontaneous dissection of coronary artery,    Is patient permission given to speak with other caregiver?  Yes   List who call center can speak with     Medication alerts for this patient  Start Plavix and rosuvastatin (CRESTOR)   Meds reviewed with patient/caregiver?  Yes   Is the patient having any side effects they believe may be caused by any medication additions or changes?  No   Does the patient have all prescriptions related to this admission filled (includes statins,anticoagulants,HTN meds,anti-arrhythmia meds)  Yes   Is the patient taking all medications as directed (includes completed medication regime)?  Yes   Comments regarding appointments  Cardiac rehab started 10/24   Does the patient have a primary care provider?   Yes   Comments regarding PCP  Kimberlee Pinto MD   Has the patient kept scheduled appointments due by today?  Yes   Comments  Feeling much better, bruising gone, just small know noted on wrist now, all is fine.    What is the patient's perception of their health status since discharge?  Improving   Additional teach back comments  Feeling well, just little bit of weird feeling like long time between breaths, not really shortness of breath, just weird, not hurting, a little heaviness sort of.    Week 3 call completed?  Yes          Jagruti Brewer RN

## 2018-10-29 NOTE — PROGRESS NOTES
Attended Phase II Cardiac Rehab. No medication or health history changes reported. See [facility] for details.

## 2018-10-31 ENCOUNTER — TREATMENT (OUTPATIENT)
Dept: CARDIAC REHAB | Facility: HOSPITAL | Age: 58
End: 2018-10-31

## 2018-10-31 DIAGNOSIS — I21.4 NSTEMI (NON-ST ELEVATED MYOCARDIAL INFARCTION) (HCC): Primary | ICD-10-CM

## 2018-10-31 PROCEDURE — 93798 PHYS/QHP OP CAR RHAB W/ECG: CPT

## 2018-11-05 ENCOUNTER — READMISSION MANAGEMENT (OUTPATIENT)
Dept: CALL CENTER | Facility: HOSPITAL | Age: 58
End: 2018-11-05

## 2018-11-05 ENCOUNTER — TREATMENT (OUTPATIENT)
Dept: CARDIAC REHAB | Facility: HOSPITAL | Age: 58
End: 2018-11-05

## 2018-11-05 DIAGNOSIS — I21.4 NSTEMI (NON-ST ELEVATED MYOCARDIAL INFARCTION) (HCC): Primary | ICD-10-CM

## 2018-11-05 PROCEDURE — 93798 PHYS/QHP OP CAR RHAB W/ECG: CPT

## 2018-11-05 NOTE — PROGRESS NOTES
Attended Phase II Cardiac Rehab. No medication or health history changes reported. See [facility] for details.

## 2018-11-06 NOTE — OUTREACH NOTE
AMI Week 4 Survey      Responses   Facility patient discharged from?  [place]   Does the patient have one of the following disease processes/diagnoses(primary or secondary)?  Acute MI (STEMI,NSTEMI)   Week 4 attempt successful?  Yes   Call start time  1827   Call end time  1829   General alerts for this patient  Lives with    Discharge diagnosis  NSTEMI, spontaneous dissection of coronary artery,    Is patient permission given to speak with other caregiver?  Yes   List who call center can speak with     Medication alerts for this patient  Start Plavix and rosuvastatin (CRESTOR)   Meds reviewed with patient/caregiver?  Yes   Is the patient having any side effects they believe may be caused by any medication additions or changes?  No   Is the patient taking all medications as directed (includes completed medication regime)?  Yes   Has the patient kept scheduled appointments due by today?  Yes   Is the patient still receiving Home Health Services?  N/A   Psychosocial issues?  No   What is the patient's perception of their health status since discharge?  Improving   Nursing interventions  Nurse provided patient education   Is the patient/caregiver able to teach back signs and symptoms of when to call for help immediately:  Sudden chest discomfort, Shortness of breath at any time, Sudden discomfort in arms, back, neck or jaw, Dizziness or lightheadedness, Sudden sweating or clammy skin, Nausea or vomiting   Nursing interventions  Nurse provided patient education   Is the pateint /caregiver able to teach back the importance of cardiac rehab?  Yes [She is in cardiac rehab at present. ]   Is the patient/caregiver able to teach back lifestyle changes to help prevent MIs  Heart healthy diet, Regular exercise as approved by provider   Is the patient/caregiver able to teach back ways to prevent a second heart attack:  Take medications, Follow up with MD, Participate in Cardiac Rehab   Is the patient/caregiver  able to teach back the hierarchy of who to call/visit for symptoms/problems? PCP, Specialist, Home health nurse, Urgent Care, ED, 911  Yes   Week 4 call completed?  Yes          Chasity Belcher RN

## 2018-11-07 ENCOUNTER — TREATMENT (OUTPATIENT)
Dept: CARDIAC REHAB | Facility: HOSPITAL | Age: 58
End: 2018-11-07

## 2018-11-07 DIAGNOSIS — I21.4 NSTEMI (NON-ST ELEVATED MYOCARDIAL INFARCTION) (HCC): Primary | ICD-10-CM

## 2018-11-07 PROCEDURE — 93798 PHYS/QHP OP CAR RHAB W/ECG: CPT

## 2018-11-16 ENCOUNTER — APPOINTMENT (OUTPATIENT)
Dept: CARDIAC REHAB | Facility: HOSPITAL | Age: 58
End: 2018-11-16

## 2018-11-19 ENCOUNTER — APPOINTMENT (OUTPATIENT)
Dept: CARDIAC REHAB | Facility: HOSPITAL | Age: 58
End: 2018-11-19

## 2018-11-21 ENCOUNTER — OFFICE VISIT (OUTPATIENT)
Dept: CARDIOLOGY | Facility: CLINIC | Age: 58
End: 2018-11-21

## 2018-11-21 ENCOUNTER — APPOINTMENT (OUTPATIENT)
Dept: CARDIAC REHAB | Facility: HOSPITAL | Age: 58
End: 2018-11-21

## 2018-11-21 VITALS
OXYGEN SATURATION: 99 % | DIASTOLIC BLOOD PRESSURE: 82 MMHG | HEART RATE: 56 BPM | BODY MASS INDEX: 27.22 KG/M2 | HEIGHT: 68 IN | SYSTOLIC BLOOD PRESSURE: 127 MMHG | WEIGHT: 179.6 LBS

## 2018-11-21 DIAGNOSIS — I10 ESSENTIAL HYPERTENSION: ICD-10-CM

## 2018-11-21 DIAGNOSIS — I21.4 NSTEMI (NON-ST ELEVATED MYOCARDIAL INFARCTION) (HCC): ICD-10-CM

## 2018-11-21 DIAGNOSIS — I25.42 SPONTANEOUS DISSECTION OF CORONARY ARTERY: Primary | ICD-10-CM

## 2018-11-21 PROCEDURE — 99213 OFFICE O/P EST LOW 20 MIN: CPT | Performed by: INTERNAL MEDICINE

## 2018-11-21 NOTE — PROGRESS NOTES
Carolyn Chaparro  1960  58 y.o.  Home phone not available  [phone]      11/21/2018    Kimberlee Pinto MD    Chief Complaint   Patient presents with   • Coronary artery dissection   • Palpitations       Problem List:  1. Coronary artery dissection:  a. NSTEMI, 10/01/2018, with C 10/02/2018: Spontaneous circumflex dissection with improvement in flow initially following an attempted wiring of the vessel. With ongoing attempts at advancing the wire however the dissection propagated proximally. With the use of Cardene, nitroglycerin, and Integrilin flow was improved. No evidence of LAD or RCA disease.  b. Echo, 10/02/2018: EF 60%. Trace Mr and TR.  2. Palpitations.  3. Hypertension.  4. GERD.    Allergies   Allergen Reactions   • Erythromycin Diarrhea       Current Medications:      Current Outpatient Medications:   •  amitriptyline (ELAVIL) 10 MG tablet, Take 10 mg by mouth Every Night., Disp: , Rfl:   •  aspirin 81 MG EC tablet, Take 81 mg by mouth Daily., Disp: , Rfl:   •  cetirizine (zyrTEC) 10 MG tablet, Take 10 mg by mouth Every Night., Disp: , Rfl:   •  cholecalciferol (VITAMIN D3) 1000 units tablet, Take 1,000 Units by mouth Daily., Disp: , Rfl:   •  clopidogrel (PLAVIX) 75 MG tablet, Take 1 tablet by mouth Daily., Disp: 30 tablet, Rfl: 2  •  FLUoxetine (PROzac) 20 MG capsule, Take 20 mg by mouth Every Night., Disp: , Rfl:   •  metoprolol succinate XL (TOPROL-XL) 25 MG 24 hr tablet, Take 25 mg by mouth Every Night., Disp: , Rfl:   •  Multiple Vitamins-Minerals (MULTIVITAMIN ADULT PO), Take 1 tablet by mouth Daily., Disp: , Rfl:   •  naproxen sodium (ALEVE) 220 MG tablet, Take 220 mg by mouth Every Night. Back Pain, Disp: , Rfl:   •  pantoprazole (PROTONIX) 40 MG EC tablet, Take 40 mg by mouth Daily., Disp: , Rfl:   •  Probiotic Product (PROBIOTIC ADVANCED PO), Take 1 tablet by mouth Daily., Disp: , Rfl:   •  rosuvastatin (CRESTOR) 5 MG tablet, Take 1 tablet by mouth Every Night., Disp: 30  "tablet, Rfl: 11  •  vitamin B-12 (CYANOCOBALAMIN) 1000 MCG tablet, Take 1,000 mcg by mouth Daily., Disp: , Rfl:     HPI    Carolyn Chaparro is a 58 y.o. female who presents today for 6 week hospital follow up s/p NSTEMI. She has a history of CAD and hypertension. Since last visit, she has been doing well overall from a cardiovascular standpoint. She went to cardiac rehab after her NSTEMI, and will now begin using her elliptical  at home to remain physically active. Patient denies chest pain, palpitations, shortness of breath, edema, dizziness, and syncope. Her diet is plant-based, and she does not eat milk products.     The following portions of the patient's history were reviewed and updated as appropriate: allergies, current medications and problem list.    Pertinent positives as listed in the HPI.  All other systems reviewed are negative.    Vitals:    11/21/18 1503   BP: 127/82   BP Location: Left arm   Patient Position: Sitting   Pulse: 56   SpO2: 99%   Weight: 81.5 kg (179 lb 9.6 oz)   Height: 172.7 cm (68\")       Physical Exam:    General: Alert and oriented  Neck: Jugular venous pressure is within normal limits. Carotids have normal upstrokes without bruits.   Cardiovascular: Heart has a nondisplaced focal PMI. Regular rate and rhythm without murmur, gallop or rub.  Lungs: Clear without rales or wheezes. Equal expansion is noted.   Extremities: Show no edema.  Skin: warm and dry.  Neurologic: nonfocal    Diagnostic Data:  Lab Results   Component Value Date    GLUCOSE 133 (H) 10/03/2018    BUN 7 (L) 10/03/2018    CREATININE 0.76 10/03/2018    EGFRIFNONA 79 10/03/2018    BCR 9.2 10/03/2018    K 3.6 10/03/2018    CO2 29.0 10/03/2018    CALCIUM 9.0 10/03/2018    ALBUMIN 4.15 10/01/2018    AST 34 (H) 10/01/2018    ALT 34 10/01/2018     Lab Results   Component Value Date    GLUCOSE 133 (H) 10/03/2018    CALCIUM 9.0 10/03/2018     10/03/2018    K 3.6 10/03/2018    CO2 29.0 10/03/2018     " 10/03/2018    BUN 7 (L) 10/03/2018    CREATININE 0.76 10/03/2018    EGFRIFNONA 79 10/03/2018    BCR 9.2 10/03/2018    ANIONGAP 6.0 10/03/2018     Lab Results   Component Value Date    CHOL 153 10/02/2018    TRIG 126 10/02/2018    HDL 41 10/02/2018     10/02/2018     Lab Results   Component Value Date    WBC 7.11 10/02/2018    HGB 12.6 10/02/2018    HCT 36.9 10/02/2018    MCV 88.9 10/02/2018     10/02/2018     Procedures    Assessment:      ICD-10-CM ICD-9-CM   1. Spontaneous dissection of coronary artery I25.42 414.12   2. NSTEMI (non-ST elevated myocardial infarction) (CMS/[facility]) I21.4 410.70   3. Essential hypertension I10 401.9       Plan:    1. Continue dual antiplatelet therapy with aspirin 81 mg and plavix s/p NSTEMI.  Discontinue Plavix at the first of the year  2. Continue metoprolol for palpitations and dissection  3. Continue rosuvastatin 5 mg for dyslipidemia.  4. Continue all other current medications.  5. F/up in 6 months with a limited echo or sooner if needed.    Scribed for Hellen Allan MD by Chanelle Bro. 11/21/2018  3:29 PM     I Hellen Allan MD personally performed the services described in this documentation as scribed by the above individual in my presence, and it is both accurate and complete.    Hellen Allan MD, [facility]

## 2018-11-23 ENCOUNTER — APPOINTMENT (OUTPATIENT)
Dept: CARDIAC REHAB | Facility: HOSPITAL | Age: 58
End: 2018-11-23

## 2018-11-26 ENCOUNTER — APPOINTMENT (OUTPATIENT)
Dept: CARDIAC REHAB | Facility: HOSPITAL | Age: 58
End: 2018-11-26

## 2018-11-28 ENCOUNTER — APPOINTMENT (OUTPATIENT)
Dept: CARDIAC REHAB | Facility: HOSPITAL | Age: 58
End: 2018-11-28

## 2018-11-30 ENCOUNTER — APPOINTMENT (OUTPATIENT)
Dept: CARDIAC REHAB | Facility: HOSPITAL | Age: 58
End: 2018-11-30

## 2018-12-03 ENCOUNTER — APPOINTMENT (OUTPATIENT)
Dept: CARDIAC REHAB | Facility: HOSPITAL | Age: 58
End: 2018-12-03

## 2018-12-05 ENCOUNTER — APPOINTMENT (OUTPATIENT)
Dept: CARDIAC REHAB | Facility: HOSPITAL | Age: 58
End: 2018-12-05

## 2018-12-07 ENCOUNTER — APPOINTMENT (OUTPATIENT)
Dept: CARDIAC REHAB | Facility: HOSPITAL | Age: 58
End: 2018-12-07

## 2018-12-10 ENCOUNTER — APPOINTMENT (OUTPATIENT)
Dept: CARDIAC REHAB | Facility: HOSPITAL | Age: 58
End: 2018-12-10

## 2018-12-12 ENCOUNTER — APPOINTMENT (OUTPATIENT)
Dept: CARDIAC REHAB | Facility: HOSPITAL | Age: 58
End: 2018-12-12

## 2018-12-14 ENCOUNTER — APPOINTMENT (OUTPATIENT)
Dept: CARDIAC REHAB | Facility: HOSPITAL | Age: 58
End: 2018-12-14

## 2018-12-17 ENCOUNTER — APPOINTMENT (OUTPATIENT)
Dept: CARDIAC REHAB | Facility: HOSPITAL | Age: 58
End: 2018-12-17

## 2018-12-18 ENCOUNTER — TELEPHONE (OUTPATIENT)
Dept: CARDIOLOGY | Facility: CLINIC | Age: 58
End: 2018-12-18

## 2018-12-18 DIAGNOSIS — I49.8 FLUTTERING HEART: Primary | ICD-10-CM

## 2018-12-18 NOTE — TELEPHONE ENCOUNTER
PT calls with c/o heart fluttering- she reports that this is different than what she has had in the past- she states that it is like heart is fluttering and skipping beats- she states that it happens daily and that she thinks that we will be able to catch it with a 24 hour monitor- order placed and transferred to [facility] to discuss when she can come in to have it placed- further recommendations after the monitor is complete

## 2018-12-19 ENCOUNTER — APPOINTMENT (OUTPATIENT)
Dept: CARDIAC REHAB | Facility: HOSPITAL | Age: 58
End: 2018-12-19

## 2018-12-21 ENCOUNTER — APPOINTMENT (OUTPATIENT)
Dept: CARDIAC REHAB | Facility: HOSPITAL | Age: 58
End: 2018-12-21

## 2018-12-24 ENCOUNTER — APPOINTMENT (OUTPATIENT)
Dept: CARDIAC REHAB | Facility: HOSPITAL | Age: 58
End: 2018-12-24

## 2018-12-26 ENCOUNTER — APPOINTMENT (OUTPATIENT)
Dept: CARDIAC REHAB | Facility: HOSPITAL | Age: 58
End: 2018-12-26

## 2018-12-26 ENCOUNTER — TELEPHONE (OUTPATIENT)
Dept: CARDIOLOGY | Facility: CLINIC | Age: 58
End: 2018-12-26

## 2018-12-26 NOTE — TELEPHONE ENCOUNTER
LM on VM to call me back to discuss Holter and BP- will await her return call- per [facility] we can increase Toprol XL to 50 mg daily-     holter showed Occ PVC's, rare PAC's, rare couplet, brief atach 13 beats-

## 2018-12-28 ENCOUNTER — APPOINTMENT (OUTPATIENT)
Dept: CARDIAC REHAB | Facility: HOSPITAL | Age: 58
End: 2018-12-28

## 2018-12-31 ENCOUNTER — APPOINTMENT (OUTPATIENT)
Dept: CARDIAC REHAB | Facility: HOSPITAL | Age: 58
End: 2018-12-31

## 2018-12-31 RX ORDER — CLOPIDOGREL BISULFATE 75 MG/1
TABLET ORAL
Qty: 30 TABLET | Refills: 1 | OUTPATIENT
Start: 2018-12-31

## 2019-01-02 ENCOUNTER — APPOINTMENT (OUTPATIENT)
Dept: CARDIAC REHAB | Facility: HOSPITAL | Age: 59
End: 2019-01-02

## 2019-01-04 ENCOUNTER — APPOINTMENT (OUTPATIENT)
Dept: CARDIAC REHAB | Facility: HOSPITAL | Age: 59
End: 2019-01-04

## 2019-01-07 ENCOUNTER — APPOINTMENT (OUTPATIENT)
Dept: CARDIAC REHAB | Facility: HOSPITAL | Age: 59
End: 2019-01-07

## 2019-01-09 ENCOUNTER — APPOINTMENT (OUTPATIENT)
Dept: CARDIAC REHAB | Facility: HOSPITAL | Age: 59
End: 2019-01-09

## 2019-01-11 ENCOUNTER — APPOINTMENT (OUTPATIENT)
Dept: CARDIAC REHAB | Facility: HOSPITAL | Age: 59
End: 2019-01-11

## 2019-01-14 ENCOUNTER — APPOINTMENT (OUTPATIENT)
Dept: CARDIAC REHAB | Facility: HOSPITAL | Age: 59
End: 2019-01-14

## 2019-01-16 ENCOUNTER — APPOINTMENT (OUTPATIENT)
Dept: CARDIAC REHAB | Facility: HOSPITAL | Age: 59
End: 2019-01-16

## 2019-01-18 ENCOUNTER — APPOINTMENT (OUTPATIENT)
Dept: CARDIAC REHAB | Facility: HOSPITAL | Age: 59
End: 2019-01-18

## 2019-01-21 ENCOUNTER — APPOINTMENT (OUTPATIENT)
Dept: CARDIAC REHAB | Facility: HOSPITAL | Age: 59
End: 2019-01-21

## 2019-01-23 ENCOUNTER — APPOINTMENT (OUTPATIENT)
Dept: CARDIAC REHAB | Facility: HOSPITAL | Age: 59
End: 2019-01-23

## 2019-01-25 ENCOUNTER — APPOINTMENT (OUTPATIENT)
Dept: CARDIAC REHAB | Facility: HOSPITAL | Age: 59
End: 2019-01-25

## 2019-01-28 ENCOUNTER — APPOINTMENT (OUTPATIENT)
Dept: CARDIAC REHAB | Facility: HOSPITAL | Age: 59
End: 2019-01-28

## 2019-01-30 ENCOUNTER — APPOINTMENT (OUTPATIENT)
Dept: CARDIAC REHAB | Facility: HOSPITAL | Age: 59
End: 2019-01-30

## 2019-02-01 ENCOUNTER — APPOINTMENT (OUTPATIENT)
Dept: CARDIAC REHAB | Facility: HOSPITAL | Age: 59
End: 2019-02-01

## 2019-02-04 ENCOUNTER — APPOINTMENT (OUTPATIENT)
Dept: CARDIAC REHAB | Facility: HOSPITAL | Age: 59
End: 2019-02-04

## 2019-02-06 ENCOUNTER — APPOINTMENT (OUTPATIENT)
Dept: CARDIAC REHAB | Facility: HOSPITAL | Age: 59
End: 2019-02-06

## 2019-04-01 DIAGNOSIS — I25.10 CAD IN NATIVE ARTERY: Primary | ICD-10-CM

## 2019-04-01 DIAGNOSIS — Z79.899 LONG-TERM USE OF HIGH-RISK MEDICATION: ICD-10-CM

## 2019-05-09 ENCOUNTER — TELEPHONE (OUTPATIENT)
Dept: CARDIOLOGY | Facility: CLINIC | Age: 59
End: 2019-05-09

## 2019-05-09 ENCOUNTER — CLINICAL SUPPORT (OUTPATIENT)
Dept: CARDIOLOGY | Facility: CLINIC | Age: 59
End: 2019-05-09

## 2019-05-09 DIAGNOSIS — R00.2 HEART PALPITATIONS: Primary | ICD-10-CM

## 2019-05-09 PROCEDURE — 93000 ELECTROCARDIOGRAM COMPLETE: CPT | Performed by: INTERNAL MEDICINE

## 2019-05-09 NOTE — TELEPHONE ENCOUNTER
PT calls with c/o heart skipping beats- she states that it is happening more often but is similar to what she was experiencing last year when she wore the holter- she will come in either today or tomorrow for EKG and then we will go from there-

## 2019-05-10 NOTE — TELEPHONE ENCOUNTER
PT was in for EKG yesterday and she was having PVC's- had her to increase Metoprolol Succ to 50 mg daily and she will call me in 1 week with an update

## 2019-06-04 DIAGNOSIS — I25.42 SPONTANEOUS DISSECTION OF CORONARY ARTERY: ICD-10-CM

## 2019-06-04 DIAGNOSIS — I21.4 NSTEMI (NON-ST ELEVATED MYOCARDIAL INFARCTION) (HCC): ICD-10-CM

## 2019-06-04 DIAGNOSIS — I10 ESSENTIAL HYPERTENSION: Primary | ICD-10-CM

## 2019-06-05 RX ORDER — METOPROLOL SUCCINATE 50 MG/1
50 TABLET, EXTENDED RELEASE ORAL DAILY
Qty: 90 TABLET | Refills: 3 | Status: SHIPPED | OUTPATIENT
Start: 2019-06-05 | End: 2019-08-27 | Stop reason: SDUPTHER

## 2019-07-16 ENCOUNTER — LAB (OUTPATIENT)
Dept: LAB | Facility: HOSPITAL | Age: 59
End: 2019-07-16

## 2019-07-16 DIAGNOSIS — Z79.899 LONG-TERM USE OF HIGH-RISK MEDICATION: ICD-10-CM

## 2019-07-16 DIAGNOSIS — I25.10 CAD IN NATIVE ARTERY: ICD-10-CM

## 2019-07-16 LAB
ALBUMIN SERPL-MCNC: 4.6 G/DL (ref 3.5–5.2)
ALBUMIN/GLOB SERPL: 1.6 G/DL
ALP SERPL-CCNC: 64 U/L (ref 39–117)
ALT SERPL W P-5'-P-CCNC: 26 U/L (ref 1–33)
ANION GAP SERPL CALCULATED.3IONS-SCNC: 12.3 MMOL/L (ref 5–15)
AST SERPL-CCNC: 31 U/L (ref 1–32)
BILIRUB SERPL-MCNC: 0.5 MG/DL (ref 0.2–1.2)
BUN BLD-MCNC: 9 MG/DL (ref 6–20)
BUN/CREAT SERPL: 12.7 (ref 7–25)
CALCIUM SPEC-SCNC: 9.9 MG/DL (ref 8.6–10.5)
CHLORIDE SERPL-SCNC: 101 MMOL/L (ref 98–107)
CHOLEST SERPL-MCNC: 135 MG/DL (ref 0–200)
CO2 SERPL-SCNC: 26.7 MMOL/L (ref 22–29)
CREAT BLD-MCNC: 0.71 MG/DL (ref 0.57–1)
GFR SERPL CREATININE-BSD FRML MDRD: 85 ML/MIN/1.73
GLOBULIN UR ELPH-MCNC: 2.8 GM/DL
GLUCOSE BLD-MCNC: 110 MG/DL (ref 65–99)
HDLC SERPL-MCNC: 47 MG/DL (ref 40–60)
LDLC SERPL CALC-MCNC: 68 MG/DL (ref 0–100)
LDLC/HDLC SERPL: 1.46 {RATIO}
POTASSIUM BLD-SCNC: 4.8 MMOL/L (ref 3.5–5.2)
PROT SERPL-MCNC: 7.4 G/DL (ref 6–8.5)
SODIUM BLD-SCNC: 140 MMOL/L (ref 136–145)
TRIGL SERPL-MCNC: 98 MG/DL (ref 0–150)
VLDLC SERPL-MCNC: 19.6 MG/DL (ref 5–40)

## 2019-07-16 PROCEDURE — 80061 LIPID PANEL: CPT | Performed by: INTERNAL MEDICINE

## 2019-07-16 PROCEDURE — 80053 COMPREHEN METABOLIC PANEL: CPT | Performed by: INTERNAL MEDICINE

## 2019-07-17 ENCOUNTER — HOSPITAL ENCOUNTER (OUTPATIENT)
Dept: CARDIOLOGY | Facility: HOSPITAL | Age: 59
Discharge: HOME | End: 2019-07-17
Admitting: INTERNAL MEDICINE

## 2019-07-17 ENCOUNTER — OFFICE VISIT (OUTPATIENT)
Dept: CARDIOLOGY | Facility: CLINIC | Age: 59
End: 2019-07-17

## 2019-07-17 VITALS
OXYGEN SATURATION: 99 % | HEART RATE: 44 BPM | SYSTOLIC BLOOD PRESSURE: 110 MMHG | HEIGHT: 68 IN | WEIGHT: 176 LBS | DIASTOLIC BLOOD PRESSURE: 72 MMHG | BODY MASS INDEX: 26.67 KG/M2

## 2019-07-17 DIAGNOSIS — I10 ESSENTIAL HYPERTENSION: ICD-10-CM

## 2019-07-17 DIAGNOSIS — I25.42 SPONTANEOUS DISSECTION OF CORONARY ARTERY: ICD-10-CM

## 2019-07-17 DIAGNOSIS — I25.42 SPONTANEOUS DISSECTION OF CORONARY ARTERY: Primary | ICD-10-CM

## 2019-07-17 DIAGNOSIS — R00.2 HEART PALPITATIONS: ICD-10-CM

## 2019-07-17 DIAGNOSIS — I21.4 NSTEMI (NON-ST ELEVATED MYOCARDIAL INFARCTION) (HCC): ICD-10-CM

## 2019-07-17 LAB
BH CV ECHO MEAS - AO ROOT AREA (BSA CORRECTED): 1.9
BH CV ECHO MEAS - AO ROOT AREA: 10.4 CM^2
BH CV ECHO MEAS - AO ROOT DIAM: 3.6 CM
BH CV ECHO MEAS - BSA(HAYCOCK): 2 M^2
BH CV ECHO MEAS - BSA: 1.9 M^2
BH CV ECHO MEAS - BZI_BMI: 27.2 KILOGRAMS/M^2
BH CV ECHO MEAS - BZI_METRIC_HEIGHT: 172.7 CM
BH CV ECHO MEAS - BZI_METRIC_WEIGHT: 81.2 KG
BH CV ECHO MEAS - EDV(CUBED): 127.6 ML
BH CV ECHO MEAS - EDV(MOD-SP2): 53 ML
BH CV ECHO MEAS - EDV(MOD-SP4): 78 ML
BH CV ECHO MEAS - EDV(TEICH): 120.1 ML
BH CV ECHO MEAS - EF(CUBED): 77 %
BH CV ECHO MEAS - EF(MOD-BP): 65 %
BH CV ECHO MEAS - EF(MOD-SP2): 66 %
BH CV ECHO MEAS - EF(MOD-SP4): 64.1 %
BH CV ECHO MEAS - EF(TEICH): 68.9 %
BH CV ECHO MEAS - ESV(CUBED): 29.3 ML
BH CV ECHO MEAS - ESV(MOD-SP2): 18 ML
BH CV ECHO MEAS - ESV(MOD-SP4): 28 ML
BH CV ECHO MEAS - ESV(TEICH): 37.4 ML
BH CV ECHO MEAS - FS: 38.8 %
BH CV ECHO MEAS - IVS/LVPW: 1.1
BH CV ECHO MEAS - IVSD: 1.1 CM
BH CV ECHO MEAS - LA DIMENSION: 3.7 CM
BH CV ECHO MEAS - LA/AO: 1
BH CV ECHO MEAS - LV DIASTOLIC VOL/BSA (35-75): 40 ML/M^2
BH CV ECHO MEAS - LV MASS(C)D: 201.9 GRAMS
BH CV ECHO MEAS - LV MASS(C)DI: 103.5 GRAMS/M^2
BH CV ECHO MEAS - LV SYSTOLIC VOL/BSA (12-30): 14.4 ML/M^2
BH CV ECHO MEAS - LVIDD: 5 CM
BH CV ECHO MEAS - LVIDS: 3.1 CM
BH CV ECHO MEAS - LVLD AP2: 7.7 CM
BH CV ECHO MEAS - LVLD AP4: 7.3 CM
BH CV ECHO MEAS - LVLS AP2: 6.1 CM
BH CV ECHO MEAS - LVLS AP4: 6.6 CM
BH CV ECHO MEAS - LVPWD: 1 CM
BH CV ECHO MEAS - SI(CUBED): 50.4 ML/M^2
BH CV ECHO MEAS - SI(MOD-SP2): 18 ML/M^2
BH CV ECHO MEAS - SI(MOD-SP4): 25.6 ML/M^2
BH CV ECHO MEAS - SI(TEICH): 42.4 ML/M^2
BH CV ECHO MEAS - SV(CUBED): 98.3 ML
BH CV ECHO MEAS - SV(MOD-SP2): 35 ML
BH CV ECHO MEAS - SV(MOD-SP4): 50 ML
BH CV ECHO MEAS - SV(TEICH): 82.7 ML
BH CV VAS BP RIGHT ARM: NORMAL MMHG
LV EF 2D ECHO EST: 60 %
MAXIMAL PREDICTED HEART RATE: 163 BPM
STRESS TARGET HR: 139 BPM

## 2019-07-17 PROCEDURE — 99214 OFFICE O/P EST MOD 30 MIN: CPT | Performed by: INTERNAL MEDICINE

## 2019-07-17 PROCEDURE — 93308 TTE F-UP OR LMTD: CPT | Performed by: INTERNAL MEDICINE

## 2019-07-17 PROCEDURE — 93308 TTE F-UP OR LMTD: CPT

## 2019-07-17 RX ORDER — BISOPROLOL FUMARATE 5 MG/1
5 TABLET, FILM COATED ORAL DAILY
Qty: 90 TABLET | Refills: 3 | Status: SHIPPED | OUTPATIENT
Start: 2019-07-17 | End: 2019-08-27 | Stop reason: ALTCHOICE

## 2019-07-17 RX ORDER — LISINOPRIL 10 MG/1
10 TABLET ORAL DAILY
COMMUNITY
End: 2020-02-28 | Stop reason: SDUPTHER

## 2019-07-17 NOTE — PROGRESS NOTES
Carolyn Chaparro  1960  59 y.o.  Home phone not available  [phone]      Date: 07/17/2019    PCP: Kimberlee Pinto MD    Chief Complaint   Patient presents with   • Coronary Artery Disease   • Palpitations   • Hypertension       Problem List:  1. Coronary artery dissection:  a. NSTEMI, 10/01/2018, with LHC 10/02/2018: Spontaneous circumflex dissection with improvement in flow initially following an attempted wiring of the vessel. With ongoing attempts at advancing the wire however the dissection propagated proximally. With the use of Cardene, nitroglycerin, and Integrilin flow was improved. No evidence of LAD or RCA disease.  b. Echo, 10/02/2018: EF 60%. Trace Mr and TR.  c. 24 hour Holter, 12/19/2018: occasional PVC's, rare couplets, rare PAC's, brief atrial tachycardia, 13 beats.  d. Echocardiogram, 07/18/2019: EF 60%. LV systolic function is normal. No wall motion normalities are identified.  2. Palpitations.  3. Hypertension.  4. GERD.    Allergies   Allergen Reactions   • Erythromycin Diarrhea       Current Medications:      Current Outpatient Medications:   •  amitriptyline (ELAVIL) 10 MG tablet, Take 10 mg by mouth Every Night., Disp: , Rfl:   •  aspirin 81 MG EC tablet, Take 81 mg by mouth Daily., Disp: , Rfl:   •  cholecalciferol (VITAMIN D3) 1000 units tablet, Take 1,000 Units by mouth Daily., Disp: , Rfl:   •  Fexofenadine HCl (ALLERGY 24-HR PO), Take  by mouth Daily., Disp: , Rfl:   •  FLUoxetine (PROzac) 20 MG capsule, Take 20 mg by mouth Every Night., Disp: , Rfl:   •  lisinopril (PRINIVIL,ZESTRIL) 10 MG tablet, Take 10 mg by mouth Daily., Disp: , Rfl:   •  metoprolol succinate XL (TOPROL-XL) 50 MG 24 hr tablet, Take 1 tablet by mouth Daily., Disp: 90 tablet, Rfl: 3  •  Multiple Vitamins-Minerals (MULTIVITAMIN ADULT PO), Take 1 tablet by mouth Daily., Disp: , Rfl:   •  pantoprazole (PROTONIX) 40 MG EC tablet, Take 40 mg by mouth Daily., Disp: , Rfl:   •  Probiotic Product  "(PROBIOTIC ADVANCED PO), Take 1 tablet by mouth Daily., Disp: , Rfl:   •  rosuvastatin (CRESTOR) 5 MG tablet, Take 1 tablet by mouth Every Night., Disp: 30 tablet, Rfl: 11  •  vitamin B-12 (CYANOCOBALAMIN) 1000 MCG tablet, Take 1,000 mcg by mouth Daily., Disp: , Rfl:     HPI    Carolyn Chaparro is a 59 y.o. female who presents today for a 6 month follow up of coronary artery dissection, NSTEMI, hypertension, and palpitations. Since last visit, she has been doing well from a cardiovascular standpoint. Reported to the clinic for palpitations.  Her metoprolol was increased to 50 mg XL daily.  Since that time she reports having consistently low HR and feeling lethargic. Has been eating plant-based diet for 1 year, and inquires about the possibility of not taking rosuvastatin. Measures her BP to be 110/70 at home. Patient denies chest pain, shortness of breath, edema, dizziness, and syncope. Admits that she does not remain very physically active.    The following portions of the patient's history were reviewed and updated as appropriate: allergies, current medications and problem list.    Pertinent positives as listed in the HPI.  All other systems reviewed are negative.    Vitals:    07/17/19 1137   BP: 110/72   BP Location: Right arm   Patient Position: Sitting   Pulse: (!) 44   SpO2: 99%   Weight: 79.8 kg (176 lb)   Height: 172.7 cm (68\")       Physical Exam:    General: Alert and oriented.  Neck: Jugular venous pressure is within normal limits. Carotids have normal upstrokes without bruits.   Cardiovascular: Heart has a nondisplaced focal PMI. Regular rate and rhythm without murmur, gallop or rub.  Lungs: Clear without rales or wheezes. Equal expansion is noted.   Extremities: Show no edema.  Skin: Warm and dry.  Neurologic: Nonfocal.    Diagnostic Data:  Lab Results   Component Value Date    GLUCOSE 110 (H) 07/16/2019    BUN 9 07/16/2019    CREATININE 0.71 07/16/2019    EGFRIFNONA 85 07/16/2019    BCR 12.7 " 07/16/2019     07/16/2019    K 4.8 07/16/2019     07/16/2019    CO2 26.7 07/16/2019    CALCIUM 9.9 07/16/2019    ALBUMIN 4.60 07/16/2019    AST 31 07/16/2019    ALT 26 07/16/2019     Lab Results   Component Value Date    CHOL 135 07/16/2019    TRIG 98 07/16/2019    HDL 47 07/16/2019    LDL 68 07/16/2019      Lab Results   Component Value Date    WBC 7.11 10/02/2018    HGB 12.6 10/02/2018    HCT 36.9 10/02/2018    MCV 88.9 10/02/2018     10/02/2018     No results found for: TSH    Procedures    Assessment:    Dr. Allan reviewed echocardiogram with pt for 2 minutes.    Dr. Allan reviewed labs with pt for 2 minutes.    Dr. Allan discussed regular exercise for 30 minutes, 4-5 times per week, for 2 minutes.      ICD-10-CM ICD-9-CM   1. Spontaneous dissection of coronary artery I25.42 414.12   2. Heart palpitations R00.2 785.1   3. Essential hypertension I10 401.9     Lab results found above were reviewed with the patient.    Plan:    1. Continue anticoagulation with aspirin 81 mg s/p NSTEMI.   2. For her palpitations and history of dissection I would like for her to be on a beta-blocker however with the fatigue on an increased dose of sotalol we will switch metoprolol to 5 mg bisoprolol. Increase as necessary to 7.5 reason 10 mg daily if needed  3. DC rosuvastatin. Lipid panel in 3 months.  She would like to remain off statins if her cholesterol profile remains within reasonable limits as she is now on a plant-based diet  4. Continue all other current medications.  5. F/up in 6 months or sooner if needed.    Scribed for Hellen Allan MD by Luz Christensen. 7/17/2019  11:55 AM

## 2019-08-15 RX ORDER — DILTIAZEM HYDROCHLORIDE 120 MG/1
120 CAPSULE, EXTENDED RELEASE ORAL DAILY
Qty: 30 CAPSULE | Refills: 2 | Status: SHIPPED | OUTPATIENT
Start: 2019-08-15 | End: 2019-08-27 | Stop reason: ALTCHOICE

## 2019-08-27 RX ORDER — METOPROLOL SUCCINATE 50 MG/1
50 TABLET, EXTENDED RELEASE ORAL DAILY
Qty: 90 TABLET | Refills: 3 | Status: SHIPPED | OUTPATIENT
Start: 2019-08-27 | End: 2020-08-27

## 2019-09-19 ENCOUNTER — TELEPHONE (OUTPATIENT)
Dept: CARDIOLOGY | Facility: CLINIC | Age: 59
End: 2019-09-19

## 2019-09-19 DIAGNOSIS — R00.2 HEART PALPITATIONS: Primary | ICD-10-CM

## 2019-09-19 NOTE — TELEPHONE ENCOUNTER
"Pt states that she has been having heart rates all over the place- ranging from  and BP has been 130's/80's- she states that she has not \"been right\" since we switched her meds earlier this year- she is currently back on Metoprolol XL 25 mg daily, as she did not like the way Bisoprolol made her feel-     Will order a 5 day epatch and Thelma will call her to set up a day and time to come in to have it placed-     She verbalized understanding and is agreeable to the plan-   "

## 2019-10-15 DIAGNOSIS — I21.4 NSTEMI (NON-ST ELEVATED MYOCARDIAL INFARCTION) (HCC): Primary | ICD-10-CM

## 2019-10-17 ENCOUNTER — LAB (OUTPATIENT)
Dept: LAB | Facility: HOSPITAL | Age: 59
End: 2019-10-17

## 2019-10-17 DIAGNOSIS — I21.4 NSTEMI (NON-ST ELEVATED MYOCARDIAL INFARCTION) (HCC): ICD-10-CM

## 2019-10-17 LAB
CHOLEST SERPL-MCNC: 215 MG/DL (ref 0–200)
HDLC SERPL-MCNC: 43 MG/DL (ref 40–60)
LDLC SERPL CALC-MCNC: 145 MG/DL (ref 0–100)
LDLC/HDLC SERPL: 3.36 {RATIO}
TRIGL SERPL-MCNC: 137 MG/DL (ref 0–150)
VLDLC SERPL-MCNC: 27.4 MG/DL (ref 5–40)

## 2019-10-17 PROCEDURE — 80061 LIPID PANEL: CPT

## 2019-10-22 DIAGNOSIS — E78.5 HYPERLIPIDEMIA, UNSPECIFIED HYPERLIPIDEMIA TYPE: ICD-10-CM

## 2019-10-22 DIAGNOSIS — I25.42 SPONTANEOUS DISSECTION OF CORONARY ARTERY: Primary | ICD-10-CM

## 2019-10-22 RX ORDER — ROSUVASTATIN CALCIUM 5 MG/1
5 TABLET, COATED ORAL NIGHTLY
Qty: 30 TABLET | Refills: 11 | Status: SHIPPED | OUTPATIENT
Start: 2019-10-22 | End: 2020-10-19

## 2020-01-27 ENCOUNTER — LAB (OUTPATIENT)
Dept: LAB | Facility: HOSPITAL | Age: 60
End: 2020-01-27

## 2020-01-27 DIAGNOSIS — I25.42 SPONTANEOUS DISSECTION OF CORONARY ARTERY: ICD-10-CM

## 2020-01-27 DIAGNOSIS — E78.5 HYPERLIPIDEMIA, UNSPECIFIED HYPERLIPIDEMIA TYPE: ICD-10-CM

## 2020-01-27 LAB
ALBUMIN SERPL-MCNC: 4.6 G/DL (ref 3.5–5.2)
ALP SERPL-CCNC: 56 U/L (ref 39–117)
ALT SERPL W P-5'-P-CCNC: 26 U/L (ref 1–33)
AST SERPL-CCNC: 31 U/L (ref 1–32)
BILIRUB CONJ SERPL-MCNC: <0.2 MG/DL (ref 0.2–0.3)
BILIRUB INDIRECT SERPL-MCNC: ABNORMAL MG/DL
BILIRUB SERPL-MCNC: 0.3 MG/DL (ref 0.2–1.2)
CHOLEST SERPL-MCNC: 106 MG/DL (ref 0–200)
HDLC SERPL-MCNC: 42 MG/DL (ref 40–60)
LDLC SERPL CALC-MCNC: 53 MG/DL (ref 0–100)
LDLC/HDLC SERPL: 1.26 {RATIO}
PROT SERPL-MCNC: 6.9 G/DL (ref 6–8.5)
TRIGL SERPL-MCNC: 56 MG/DL (ref 0–150)
VLDLC SERPL-MCNC: 11.2 MG/DL (ref 5–40)

## 2020-01-27 PROCEDURE — 80061 LIPID PANEL: CPT

## 2020-01-27 PROCEDURE — 80076 HEPATIC FUNCTION PANEL: CPT

## 2020-01-29 ENCOUNTER — OFFICE VISIT (OUTPATIENT)
Dept: CARDIOLOGY | Facility: CLINIC | Age: 60
End: 2020-01-29

## 2020-01-29 VITALS
DIASTOLIC BLOOD PRESSURE: 66 MMHG | HEART RATE: 47 BPM | BODY MASS INDEX: 26.8 KG/M2 | WEIGHT: 176.8 LBS | OXYGEN SATURATION: 99 % | HEIGHT: 68 IN | SYSTOLIC BLOOD PRESSURE: 116 MMHG

## 2020-01-29 DIAGNOSIS — I49.3 PREMATURE VENTRICULAR CONTRACTIONS: ICD-10-CM

## 2020-01-29 DIAGNOSIS — I25.42 SPONTANEOUS DISSECTION OF CORONARY ARTERY: Primary | ICD-10-CM

## 2020-01-29 DIAGNOSIS — I10 ESSENTIAL HYPERTENSION: ICD-10-CM

## 2020-01-29 PROCEDURE — 99214 OFFICE O/P EST MOD 30 MIN: CPT | Performed by: INTERNAL MEDICINE

## 2020-01-29 NOTE — PROGRESS NOTES
[facility] Cardiology    Patient ID: Carolyn Chaparro is a  59 y.o.  female.She is employed at [employer].  : 1960   Contact: Home phone not available    Encounter date: 2020    PCP: Kimberlee Pinto MD      Chief complaint: No chief complaint on file.      Problem List:  1. Coronary artery dissection:  a. LHC/NSTEMI, 10/02/2018: Spontaneous circumflex dissection with improvement in flow initially following an attempted wiring of the vessel. With ongoing attempts at advancing the wire however the dissection propagated proximally. With the use of Cardene, nitroglycerin, and Integrilin flow was improved. No evidence of LAD or RCA disease.  b. Echo, 10/02/2018: EF 60%. Trace Mr and TR.  c. 24 hour Holter, 2018: occasional PVC's, rare couplets, rare PAC's, brief atrial tachycardia, 13 beats.  d. Echocardiogram, 2019: EF 60%. LV systolic function is normal. No wall motion normalities are identified.  2. Palpitations:  a. 2 week monitor, 2019: Occasional PVCs (1.8%), occasional couplets. Brief atach, longest 14 beats.  3. Hypertension.  4. GERD.    Allergies   Allergen Reactions   • Bisoprolol Other (See Comments)     Ineffective in controlling palps   • Erythromycin Diarrhea   • Metoprolol Other (See Comments)     fatigue       Current Medications:      Current Outpatient Medications:   •  amitriptyline (ELAVIL) 10 MG tablet, Take 10 mg by mouth Every Night., Disp: , Rfl:   •  aspirin 81 MG EC tablet, Take 81 mg by mouth Daily., Disp: , Rfl:   •  cholecalciferol (VITAMIN D3) 1000 units tablet, Take 1,000 Units by mouth Daily., Disp: , Rfl:   •  Fexofenadine HCl (ALLERGY 24-HR PO), Take  by mouth Daily., Disp: , Rfl:   •  FLUoxetine (PROzac) 20 MG capsule, Take 20 mg by mouth Every Night., Disp: , Rfl:   •  lisinopril (PRINIVIL,ZESTRIL) 10 MG tablet, Take 10 mg by mouth Daily., Disp: , Rfl:   •  metoprolol succinate XL  "(TOPROL-XL) 50 MG 24 hr tablet, Take 1 tablet by mouth Daily., Disp: 90 tablet, Rfl: 3  •  Multiple Vitamins-Minerals (MULTIVITAMIN ADULT PO), Take 1 tablet by mouth Daily., Disp: , Rfl:   •  pantoprazole (PROTONIX) 40 MG EC tablet, Take 40 mg by mouth Daily., Disp: , Rfl:   •  Probiotic Product (PROBIOTIC ADVANCED PO), Take 1 tablet by mouth Daily., Disp: , Rfl:   •  rosuvastatin (CRESTOR) 5 MG tablet, Take 1 tablet by mouth Every Night., Disp: 30 tablet, Rfl: 11  •  vitamin B-12 (CYANOCOBALAMIN) 1000 MCG tablet, Take 1,000 mcg by mouth Daily., Disp: , Rfl:     HPI    Carolyn Chaparro is a 59 y.o. female who presents today for 6 month follow up of coronary artery dissection, palpitations, and hypertension. At last visit, she was switched from metoprolol to bisoprolol due to fatigue. This helped with her fatigue, but made her palpitations much worse, so she went back to metoprolol. Patient is off statin therapy as of last visit, and has been controlling her lipid levels with a plant-based diet and exercise. Patient denies chest pain, shortness of breath, edema, dizziness, and syncope.        The following portions of the patient's history were reviewed and updated as appropriate: allergies, current medications and problem list.    Pertinent positives as listed in the HPI.  All other systems reviewed are negative.         Vitals:    01/29/20 1520   BP: 116/66   BP Location: Right arm   Patient Position: Sitting   Pulse: (!) 47   SpO2: 99%   Weight: 80.2 kg (176 lb 12.8 oz)   Height: 172.7 cm (68\")       Physical Exam:  General: Alert and oriented.  Neck: Jugular venous pressure is within normal limits. Carotids have normal upstrokes without bruits.   Cardiovascular: Heart has a nondisplaced focal PMI. Regular rate and rhythm without murmur, gallop or rub.  Lungs: Clear without rales or wheezes. Equal expansion is noted.   Extremities: Show no edema.  Skin: Warm and dry.  Neurologic: Nonfocal.     Diagnostic " Data:    Lab Results   Component Value Date    CHOL 106 01/27/2020    TRIG 56 01/27/2020    HDL 42 01/27/2020    LDL 53 01/27/2020      Lab Results   Component Value Date    AST 31 01/27/2020    ALT 26 01/27/2020    ALBUMIN 4.60 01/27/2020        Procedures      Assessment:    ICD-10-CM ICD-9-CM   1. Spontaneous dissection of coronary artery I25.42 414.12   2. Premature ventricular contractions I49.3 427.69   3. Essential hypertension I10 401.9     Lab results found above were reviewed with the patient.        Plan:  1. Increase metoprolol from 50 mg daily to twice daily for better control of heart rate and palpitations.  2. Continue lisinopril for hypertension.  3. Continue all other current medications.  4. F/up in 12 months, sooner if needed.      Scribed for Hellen Allan MD by Chanelle Bro. 1/29/2020  3:32 PM     I Hellen Allan MD personally performed the services described in this documentation as scribed by the above individual in my presence, and it is both accurate and complete.    Hellen Allan MD, FACC

## 2020-02-28 ENCOUNTER — TELEPHONE (OUTPATIENT)
Dept: CARDIOLOGY | Facility: CLINIC | Age: 60
End: 2020-02-28

## 2020-02-28 RX ORDER — LISINOPRIL 10 MG/1
10 TABLET ORAL 2 TIMES DAILY
Qty: 180 TABLET | Refills: 3 | Status: SHIPPED | OUTPATIENT
Start: 2020-02-28 | End: 2020-03-10 | Stop reason: DRUGHIGH

## 2020-02-28 NOTE — TELEPHONE ENCOUNTER
"PT called to request refill on Lisinopril 10 mg daily- her BP started going up a few days ago- 150's/90's- she increased lisinopril to 10 mg BID and it is slowly coming down but still not \"normal\"- she will continue 10 mg BID and update me next week with BP and HR readings       "

## 2020-03-09 ENCOUNTER — TELEPHONE (OUTPATIENT)
Dept: CARDIOLOGY | Facility: CLINIC | Age: 60
End: 2020-03-09

## 2020-03-09 NOTE — TELEPHONE ENCOUNTER
Please see pt email below.    Pt currently taking:  Lisinopril 10 mg BID  Metoprolol succinate 50 mg daily

## 2020-03-09 NOTE — TELEPHONE ENCOUNTER
----- Message from Carolyn Chaparro sent at 3/9/2020  9:42 AM EDT -----  Regarding: RE: Complaint  Yes, ma'am.  I waited an hour after I took the lisinopril in the morning before I took my bp each day.  ----- Message -----  From: MARCOS PRECIADO  Sent: 3/9/2020  9:21 AM EDT  To: Carolyn Chaparro  Subject: RE: Complaint  Good morning,    Were these blood pressure readings taken while you were taking lisinopril 10 mg in the morning and lisinopril 10 mg at night?    Thanks,  Oxana Leung RN      ----- Message -----     From: Carolyn Chaparro     Sent: 3/9/2020  9:08 AM EDT       To: Hellen Allan MD  Subject: Complaint    Arsen Brown,    I called you last week to ask for a refill on my lisinopril, which I needed sooner than insurance would pay.  I had been taking one 10 mg per day, but I have been having high bp so I increased it to one in the morning and one at night.  You asked me to take my bp each day this past week and report to you:    3/2/20    136/82        59 heart rate  3/3/20    136/88        56 heart rate  3/4/20    134/84        59 heart rate  3/5/20    157/95        54 heart rate  3/6/20    145/91        55 heart rate  3/7/20    144/86        56 heart rate  3/8/20    147/85        54 heart rate    Should I increase my dosage?    Thank you,  Carolyn Chaparro  [phone]

## 2020-03-10 RX ORDER — LISINOPRIL 40 MG/1
40 TABLET ORAL DAILY
Qty: 90 TABLET | Refills: 3 | Status: SHIPPED | OUTPATIENT
Start: 2020-03-10 | End: 2022-03-23

## 2020-03-23 RX ORDER — TERAZOSIN 2 MG/1
2 CAPSULE ORAL NIGHTLY
Qty: 30 CAPSULE | Refills: 3 | Status: SHIPPED | OUTPATIENT
Start: 2020-03-23 | End: 2020-04-14 | Stop reason: SDUPTHER

## 2020-04-14 RX ORDER — TERAZOSIN 2 MG/1
2 CAPSULE ORAL NIGHTLY
Qty: 90 CAPSULE | Refills: 3 | Status: SHIPPED | OUTPATIENT
Start: 2020-04-14 | End: 2021-06-10

## 2020-08-27 RX ORDER — METOPROLOL SUCCINATE 50 MG/1
TABLET, EXTENDED RELEASE ORAL
Qty: 90 TABLET | Refills: 2 | Status: SHIPPED | OUTPATIENT
Start: 2020-08-27 | End: 2021-05-24

## 2020-10-19 RX ORDER — ROSUVASTATIN CALCIUM 5 MG/1
TABLET, COATED ORAL
Qty: 90 TABLET | Refills: 3 | Status: SHIPPED | OUTPATIENT
Start: 2020-10-19 | End: 2021-10-25

## 2021-02-08 DIAGNOSIS — E78.00 HYPERCHOLESTEREMIA: Primary | ICD-10-CM

## 2021-02-09 ENCOUNTER — LAB (OUTPATIENT)
Dept: LAB | Facility: HOSPITAL | Age: 61
End: 2021-02-09

## 2021-02-09 DIAGNOSIS — E78.00 HYPERCHOLESTEREMIA: ICD-10-CM

## 2021-02-09 LAB
ALBUMIN SERPL-MCNC: 4 G/DL (ref 3.5–5.2)
ALBUMIN/GLOB SERPL: 1.6 G/DL
ALP SERPL-CCNC: 62 U/L (ref 39–117)
ALT SERPL W P-5'-P-CCNC: 27 U/L (ref 1–33)
ANION GAP SERPL CALCULATED.3IONS-SCNC: 8.2 MMOL/L (ref 5–15)
AST SERPL-CCNC: 25 U/L (ref 1–32)
BILIRUB SERPL-MCNC: 0.3 MG/DL (ref 0–1.2)
BUN SERPL-MCNC: 14 MG/DL (ref 6–20)
BUN/CREAT SERPL: 21.9 (ref 7–25)
CALCIUM SPEC-SCNC: 9.1 MG/DL (ref 8.6–10.5)
CHLORIDE SERPL-SCNC: 105 MMOL/L (ref 98–107)
CHOLEST SERPL-MCNC: 126 MG/DL (ref 0–200)
CO2 SERPL-SCNC: 27.8 MMOL/L (ref 22–29)
CREAT SERPL-MCNC: 0.64 MG/DL (ref 0.57–1)
GFR SERPL CREATININE-BSD FRML MDRD: 95 ML/MIN/1.73
GLOBULIN UR ELPH-MCNC: 2.5 GM/DL
GLUCOSE SERPL-MCNC: 97 MG/DL (ref 65–99)
HDLC SERPL-MCNC: 47 MG/DL (ref 40–60)
LDLC SERPL CALC-MCNC: 64 MG/DL (ref 0–100)
LDLC/HDLC SERPL: 1.37 {RATIO}
POTASSIUM SERPL-SCNC: 4.5 MMOL/L (ref 3.5–5.2)
PROT SERPL-MCNC: 6.5 G/DL (ref 6–8.5)
SODIUM SERPL-SCNC: 141 MMOL/L (ref 136–145)
TRIGL SERPL-MCNC: 74 MG/DL (ref 0–150)
VLDLC SERPL-MCNC: 15 MG/DL (ref 5–40)

## 2021-02-09 PROCEDURE — 80061 LIPID PANEL: CPT

## 2021-02-09 PROCEDURE — 80053 COMPREHEN METABOLIC PANEL: CPT

## 2021-03-22 ENCOUNTER — OFFICE VISIT (OUTPATIENT)
Dept: CARDIOLOGY | Facility: CLINIC | Age: 61
End: 2021-03-22

## 2021-03-22 VITALS
BODY MASS INDEX: 27.11 KG/M2 | OXYGEN SATURATION: 98 % | DIASTOLIC BLOOD PRESSURE: 62 MMHG | HEIGHT: 69 IN | HEART RATE: 59 BPM | WEIGHT: 183 LBS | SYSTOLIC BLOOD PRESSURE: 100 MMHG

## 2021-03-22 DIAGNOSIS — I25.42 SPONTANEOUS DISSECTION OF CORONARY ARTERY: Primary | ICD-10-CM

## 2021-03-22 DIAGNOSIS — I10 ESSENTIAL HYPERTENSION: ICD-10-CM

## 2021-03-22 DIAGNOSIS — I49.3 PREMATURE VENTRICULAR CONTRACTIONS: ICD-10-CM

## 2021-03-22 DIAGNOSIS — E78.5 HYPERLIPIDEMIA, UNSPECIFIED HYPERLIPIDEMIA TYPE: ICD-10-CM

## 2021-03-22 PROCEDURE — 99214 OFFICE O/P EST MOD 30 MIN: CPT | Performed by: INTERNAL MEDICINE

## 2021-03-22 RX ORDER — SERTRALINE HYDROCHLORIDE 100 MG/1
100 TABLET, FILM COATED ORAL DAILY
COMMUNITY

## 2021-03-22 NOTE — PROGRESS NOTES
[facility] Cardiology    Patient ID: Carolyn Chaparro is a  60 y.o. female. She is employed at [employer].  : 1960   Contact: [phone]    Encounter date: 2021    PCP: Erika Hernandez PA      Chief complaint:   Chief Complaint   Patient presents with   • Spontaneous dissection of coronary artery       Problem List:  1. Coronary artery dissection:  a. LHC/NSTEMI, 10/02/2018: Spontaneous circumflex dissection with improvement in flow initially following an attempted wiring of the vessel. With ongoing attempts at advancing the wire however the dissection propagated proximally. With the use of Cardene, nitroglycerin, and Integrilin flow was improved. No evidence of LAD or RCA disease.  b. Echo, 10/02/2018: EF 60%. Trace Mr and TR.  c. 24 hour Holter, 2018: occasional PVC's, rare couplets, rare PAC's, brief atrial tachycardia, 13 beats.  d. Echocardiogram, 2019: EF 60%. LV systolic function is normal. No wall motion normalities are identified.  2. Palpitations:  a. 2 week monitor, 2019: Occasional PVCs (1.8%), occasional couplets. Brief atach, longest 14 beats.  3. Hypertension.  4. Hyperlipidemia.  5. GERD.    Allergies   Allergen Reactions   • Bisoprolol Other (See Comments)     Ineffective in controlling palps   • Erythromycin Diarrhea   • Metoprolol Other (See Comments)     fatigue       Current Medications:    Current Outpatient Medications:   •  amitriptyline (ELAVIL) 10 MG tablet, Take 10 mg by mouth Every Night., Disp: , Rfl:   •  aspirin 81 MG EC tablet, Take 81 mg by mouth Daily., Disp: , Rfl:   •  cetirizine (zyrTEC) 10 MG tablet, Take 10 mg by mouth Daily., Disp: , Rfl:   •  cholecalciferol (VITAMIN D3) 1000 units tablet, Take 1,000 Units by mouth Daily., Disp: , Rfl:   •  metoprolol succinate XL (TOPROL-XL) 50 MG 24 hr tablet, TAKE ONE TABLET BY MOUTH DAILY, Disp: 90 tablet, Rfl: 2  •  Multiple  "Vitamins-Minerals (MULTIVITAMIN ADULT PO), Take 1 tablet by mouth Daily., Disp: , Rfl:   •  pantoprazole (PROTONIX) 40 MG EC tablet, Take 40 mg by mouth Daily., Disp: , Rfl:   •  Probiotic Product (PROBIOTIC ADVANCED PO), Take 1 tablet by mouth Daily., Disp: , Rfl:   •  rosuvastatin (CRESTOR) 5 MG tablet, TAKE ONE TABLET BY MOUTH ONCE NIGHTLY, Disp: 90 tablet, Rfl: 3  •  sertraline (ZOLOFT) 100 MG tablet, Take 100 mg by mouth Daily., Disp: , Rfl:   •  terazosin (HYTRIN) 2 MG capsule, Take 1 capsule by mouth Every Night. Stop lisinopril, Disp: 90 capsule, Rfl: 3  •  vitamin B-12 (CYANOCOBALAMIN) 1000 MCG tablet, Take 1,000 mcg by mouth Daily., Disp: , Rfl:   •  lisinopril (PRINIVIL,ZESTRIL) 40 MG tablet, Take 1 tablet by mouth Daily., Disp: 90 tablet, Rfl: 3    HPI    Carolyn Chaparro is a 60 y.o. female who presents today for a follow up of coronary artery dissection, palpitations, and hypertension. Since last visit, she has been doing well overall from a cardiovascular standpoint. For exercise, she walks her dogs a half mile every day. Patient otherwise denies chest pain, shortness of breath, palpitations, edema, dizziness, and syncope.       The following portions of the patient's history were reviewed and updated as appropriate: allergies, current medications and problem list.    Pertinent positives as listed in the HPI.  All other systems reviewed are negative.         Vitals:    03/22/21 0930   BP: 100/62   BP Location: Left arm   Patient Position: Sitting   Pulse: 59   SpO2: 98%   Weight: 83 kg (183 lb)   Height: 175.3 cm (69\")       Physical Exam:  General: Alert and oriented.  Neck: Jugular venous pressure is within normal limits. Carotids have normal upstrokes without bruits.   Cardiovascular: Heart has a nondisplaced focal PMI. Regular rate and rhythm. No murmur, gallop or rub.  Lungs: Clear, no rales or wheezes. Equal expansion is noted.   Extremities: Show no edema.  Skin: Warm and " dry.  Neurologic: Nonfocal.     Diagnostic Data (reviewed with patient):  Lab Results   Component Value Date    GLUCOSE 97 02/09/2021    CALCIUM 9.1 02/09/2021     02/09/2021    K 4.5 02/09/2021    CO2 27.8 02/09/2021     02/09/2021    BUN 14 02/09/2021    CREATININE 0.64 02/09/2021    EGFRIFNONA 95 02/09/2021    BCR 21.9 02/09/2021    ANIONGAP 8.2 02/09/2021      Lab Results   Component Value Date    GLUCOSE 97 02/09/2021    BUN 14 02/09/2021    CREATININE 0.64 02/09/2021    EGFRIFNONA 95 02/09/2021    BCR 21.9 02/09/2021     02/09/2021    K 4.5 02/09/2021     02/09/2021    CO2 27.8 02/09/2021    CALCIUM 9.1 02/09/2021    ALBUMIN 4.00 02/09/2021    ALKPHOS 62 02/09/2021    AST 25 02/09/2021    ALT 27 02/09/2021     Lab Results   Component Value Date    CHOL 126 02/09/2021    TRIG 74 02/09/2021    HDL 47 02/09/2021    LDL 64 02/09/2021      Lab Results   Component Value Date    WBC 7.11 10/02/2018    RBC 4.15 10/02/2018    HGB 12.6 10/02/2018    HCT 36.9 10/02/2018    MCV 88.9 10/02/2018     10/02/2018         Procedures      Assessment:    ICD-10-CM ICD-9-CM   1. Spontaneous dissection of coronary artery  I25.42 414.12   2. Premature ventricular contractions  I49.3 427.69   3. Essential hypertension  I10 401.9   4. Hyperlipidemia, unspecified hyperlipidemia type  E78.5 272.4         Plan:  1. Stable cardiac status.  2. Begin routine aerobic exercise for at least 30 minutes 5 days per week.  3. Continue on aspirin 81 mg daily for antiplatelet therapy.  4. Continue on metoprolol succinate 50 mg daily for rate control and hypertension.  5. Continue on rosuvastatin 5 mg daily for hyperlipidemia.   6. Continue all other current medications.  7. F/up in 12 months, sooner if needed.    Scribed for Hellen Allan MD by Solange Cazares. 3/22/2021 09:37 EDT      I Hellen Allan MD personally performed the services described in this documentation as scribed by the above individual  in my presence, and it is both accurate and complete.    Hellen Allan MD, FACC

## 2021-04-29 ENCOUNTER — PREP FOR SURGERY (OUTPATIENT)
Dept: OTHER | Facility: HOSPITAL | Age: 61
End: 2021-04-29

## 2021-04-29 DIAGNOSIS — N99.3 INCOMPLETE PROLAPSE OF VAGINAL VAULT: Primary | ICD-10-CM

## 2021-04-29 RX ORDER — SODIUM CHLORIDE 0.9 % (FLUSH) 0.9 %
10 SYRINGE (ML) INJECTION AS NEEDED
Status: CANCELLED | OUTPATIENT
Start: 2021-04-29

## 2021-04-29 RX ORDER — ACETAMINOPHEN 500 MG
1000 TABLET ORAL ONCE
Status: CANCELLED | OUTPATIENT
Start: 2021-04-29 | End: 2021-04-29

## 2021-04-29 RX ORDER — CEFAZOLIN SODIUM 2 G/100ML
2 INJECTION, SOLUTION INTRAVENOUS ONCE
Status: CANCELLED | OUTPATIENT
Start: 2021-04-29 | End: 2021-04-29

## 2021-04-29 RX ORDER — SODIUM CHLORIDE 0.9 % (FLUSH) 0.9 %
3 SYRINGE (ML) INJECTION EVERY 12 HOURS SCHEDULED
Status: CANCELLED | OUTPATIENT
Start: 2021-04-29

## 2021-04-29 RX ORDER — HEPARIN SODIUM 5000 [USP'U]/ML
5000 INJECTION, SOLUTION INTRAVENOUS; SUBCUTANEOUS ONCE
Status: CANCELLED | OUTPATIENT
Start: 2021-04-29 | End: 2021-04-29

## 2021-04-29 RX ORDER — GABAPENTIN 300 MG/1
600 CAPSULE ORAL ONCE
Status: CANCELLED | OUTPATIENT
Start: 2021-04-29 | End: 2021-04-29

## 2021-05-11 ENCOUNTER — APPOINTMENT (OUTPATIENT)
Dept: PREADMISSION TESTING | Facility: HOSPITAL | Age: 61
End: 2021-05-11

## 2021-05-11 ENCOUNTER — ANESTHESIA EVENT (OUTPATIENT)
Dept: PERIOP | Facility: HOSPITAL | Age: 61
End: 2021-05-11

## 2021-05-11 ENCOUNTER — PRE-ADMISSION TESTING (OUTPATIENT)
Dept: PREADMISSION TESTING | Facility: HOSPITAL | Age: 61
End: 2021-05-11

## 2021-05-11 VITALS — WEIGHT: 185.2 LBS | HEIGHT: 68 IN | BODY MASS INDEX: 28.07 KG/M2

## 2021-05-11 DIAGNOSIS — N99.3 INCOMPLETE PROLAPSE OF VAGINAL VAULT: ICD-10-CM

## 2021-05-11 LAB
B-HCG UR QL: NEGATIVE
BASOPHILS # BLD AUTO: 0.05 10*3/MM3 (ref 0–0.2)
BASOPHILS NFR BLD AUTO: 1.1 % (ref 0–1.5)
BILIRUB UR QL STRIP: NEGATIVE
CLARITY UR: CLEAR
COLOR UR: YELLOW
DEPRECATED RDW RBC AUTO: 39.8 FL (ref 37–54)
EOSINOPHIL # BLD AUTO: 0.24 10*3/MM3 (ref 0–0.4)
EOSINOPHIL NFR BLD AUTO: 5.1 % (ref 0.3–6.2)
ERYTHROCYTE [DISTWIDTH] IN BLOOD BY AUTOMATED COUNT: 11.9 % (ref 12.3–15.4)
GLUCOSE UR STRIP-MCNC: NEGATIVE MG/DL
HBA1C MFR BLD: 4.9 % (ref 4.8–5.6)
HCT VFR BLD AUTO: 41 % (ref 34–46.6)
HGB BLD-MCNC: 13.9 G/DL (ref 12–15.9)
HGB UR QL STRIP.AUTO: NEGATIVE
IMM GRANULOCYTES # BLD AUTO: 0.01 10*3/MM3 (ref 0–0.05)
IMM GRANULOCYTES NFR BLD AUTO: 0.2 % (ref 0–0.5)
KETONES UR QL STRIP: NEGATIVE
LEUKOCYTE ESTERASE UR QL STRIP.AUTO: NEGATIVE
LYMPHOCYTES # BLD AUTO: 1.18 10*3/MM3 (ref 0.7–3.1)
LYMPHOCYTES NFR BLD AUTO: 25.2 % (ref 19.6–45.3)
MCH RBC QN AUTO: 31.2 PG (ref 26.6–33)
MCHC RBC AUTO-ENTMCNC: 33.9 G/DL (ref 31.5–35.7)
MCV RBC AUTO: 91.9 FL (ref 79–97)
MONOCYTES # BLD AUTO: 0.51 10*3/MM3 (ref 0.1–0.9)
MONOCYTES NFR BLD AUTO: 10.9 % (ref 5–12)
NEUTROPHILS NFR BLD AUTO: 2.7 10*3/MM3 (ref 1.7–7)
NEUTROPHILS NFR BLD AUTO: 57.5 % (ref 42.7–76)
NITRITE UR QL STRIP: NEGATIVE
NRBC BLD AUTO-RTO: 0 /100 WBC (ref 0–0.2)
PH UR STRIP.AUTO: 7 [PH] (ref 5–8)
PLATELET # BLD AUTO: 206 10*3/MM3 (ref 140–450)
PMV BLD AUTO: 9.5 FL (ref 6–12)
POTASSIUM SERPL-SCNC: 4.8 MMOL/L (ref 3.5–5.2)
PROT UR QL STRIP: NEGATIVE
QT INTERVAL: 454 MS
QTC INTERVAL: 426 MS
RBC # BLD AUTO: 4.46 10*6/MM3 (ref 3.77–5.28)
SARS-COV-2 RNA PNL SPEC NAA+PROBE: NOT DETECTED
SP GR UR STRIP: 1.01 (ref 1–1.03)
UROBILINOGEN UR QL STRIP: NORMAL
WBC # BLD AUTO: 4.69 10*3/MM3 (ref 3.4–10.8)

## 2021-05-11 PROCEDURE — 93005 ELECTROCARDIOGRAM TRACING: CPT

## 2021-05-11 PROCEDURE — 81025 URINE PREGNANCY TEST: CPT

## 2021-05-11 PROCEDURE — 36415 COLL VENOUS BLD VENIPUNCTURE: CPT

## 2021-05-11 PROCEDURE — 85025 COMPLETE CBC W/AUTO DIFF WBC: CPT

## 2021-05-11 PROCEDURE — 84132 ASSAY OF SERUM POTASSIUM: CPT

## 2021-05-11 PROCEDURE — U0004 COV-19 TEST NON-CDC HGH THRU: HCPCS

## 2021-05-11 PROCEDURE — 83036 HEMOGLOBIN GLYCOSYLATED A1C: CPT

## 2021-05-11 PROCEDURE — C9803 HOPD COVID-19 SPEC COLLECT: HCPCS

## 2021-05-11 PROCEDURE — 93010 ELECTROCARDIOGRAM REPORT: CPT | Performed by: INTERNAL MEDICINE

## 2021-05-11 PROCEDURE — 81003 URINALYSIS AUTO W/O SCOPE: CPT

## 2021-05-12 RX ORDER — FAMOTIDINE 10 MG/ML
20 INJECTION, SOLUTION INTRAVENOUS ONCE
Status: CANCELLED | OUTPATIENT
Start: 2021-05-12 | End: 2021-05-12

## 2021-05-13 ENCOUNTER — HOSPITAL ENCOUNTER (OUTPATIENT)
Facility: HOSPITAL | Age: 61
Discharge: HOME | End: 2021-05-15
Attending: OBSTETRICS & GYNECOLOGY | Admitting: OBSTETRICS & GYNECOLOGY

## 2021-05-13 ENCOUNTER — ANESTHESIA (OUTPATIENT)
Dept: PERIOP | Facility: HOSPITAL | Age: 61
End: 2021-05-13

## 2021-05-13 DIAGNOSIS — N99.3 INCOMPLETE PROLAPSE OF VAGINAL VAULT: Primary | ICD-10-CM

## 2021-05-13 PROCEDURE — 25010000002 FENTANYL CITRATE (PF) 50 MCG/ML SOLUTION: Performed by: NURSE ANESTHETIST, CERTIFIED REGISTERED

## 2021-05-13 PROCEDURE — 25010000002 FENTANYL CITRATE (PF) 100 MCG/2ML SOLUTION: Performed by: NURSE ANESTHETIST, CERTIFIED REGISTERED

## 2021-05-13 PROCEDURE — 25010000002 NEOSTIGMINE 10 MG/10ML SOLUTION: Performed by: NURSE ANESTHETIST, CERTIFIED REGISTERED

## 2021-05-13 PROCEDURE — 25010000003 CEFAZOLIN IN DEXTROSE 2-4 GM/100ML-% SOLUTION: Performed by: OBSTETRICS & GYNECOLOGY

## 2021-05-13 PROCEDURE — C1781 MESH (IMPLANTABLE): HCPCS | Performed by: OBSTETRICS & GYNECOLOGY

## 2021-05-13 PROCEDURE — 25010000002 DEXAMETHASONE PER 1 MG: Performed by: NURSE ANESTHETIST, CERTIFIED REGISTERED

## 2021-05-13 PROCEDURE — G0378 HOSPITAL OBSERVATION PER HR: HCPCS

## 2021-05-13 PROCEDURE — 25010000002 HEPARIN (PORCINE) PER 1000 UNITS: Performed by: OBSTETRICS & GYNECOLOGY

## 2021-05-13 PROCEDURE — 25010000002 PROPOFOL 10 MG/ML EMULSION: Performed by: NURSE ANESTHETIST, CERTIFIED REGISTERED

## 2021-05-13 PROCEDURE — C1771 REP DEV, URINARY, W/SLING: HCPCS | Performed by: OBSTETRICS & GYNECOLOGY

## 2021-05-13 PROCEDURE — 25010000002 ONDANSETRON PER 1 MG: Performed by: NURSE ANESTHETIST, CERTIFIED REGISTERED

## 2021-05-13 DEVICE — SIS SYSTEM
Type: IMPLANTABLE DEVICE | Site: URETHRA | Status: FUNCTIONAL
Brand: SOLYX™ SIS SYSTEM

## 2021-05-13 DEVICE — MESH ARTISYN Y SHP: Type: IMPLANTABLE DEVICE | Site: PELVIS | Status: FUNCTIONAL

## 2021-05-13 RX ORDER — SODIUM CHLORIDE 0.9 % (FLUSH) 0.9 %
10 SYRINGE (ML) INJECTION EVERY 12 HOURS SCHEDULED
Status: DISCONTINUED | OUTPATIENT
Start: 2021-05-13 | End: 2021-05-13 | Stop reason: HOSPADM

## 2021-05-13 RX ORDER — FAMOTIDINE 20 MG/1
20 TABLET, FILM COATED ORAL ONCE
Status: COMPLETED | OUTPATIENT
Start: 2021-05-13 | End: 2021-05-13

## 2021-05-13 RX ORDER — MELOXICAM 7.5 MG/1
7.5 TABLET ORAL DAILY
Status: COMPLETED | OUTPATIENT
Start: 2021-05-14 | End: 2021-05-15

## 2021-05-13 RX ORDER — MELATONIN
1000 DAILY
Status: DISCONTINUED | OUTPATIENT
Start: 2021-05-14 | End: 2021-05-15 | Stop reason: HOSPADM

## 2021-05-13 RX ORDER — SIMETHICONE 80 MG
80 TABLET,CHEWABLE ORAL 4 TIMES DAILY PRN
Status: DISCONTINUED | OUTPATIENT
Start: 2021-05-13 | End: 2021-05-15 | Stop reason: HOSPADM

## 2021-05-13 RX ORDER — LIDOCAINE HYDROCHLORIDE 10 MG/ML
0.5 INJECTION, SOLUTION EPIDURAL; INFILTRATION; INTRACAUDAL; PERINEURAL ONCE AS NEEDED
Status: COMPLETED | OUTPATIENT
Start: 2021-05-13 | End: 2021-05-13

## 2021-05-13 RX ORDER — AMITRIPTYLINE HYDROCHLORIDE 10 MG/1
10 TABLET, FILM COATED ORAL NIGHTLY
Status: DISCONTINUED | OUTPATIENT
Start: 2021-05-13 | End: 2021-05-15 | Stop reason: HOSPADM

## 2021-05-13 RX ORDER — POLYETHYLENE GLYCOL 3350 17 G/17G
17 POWDER, FOR SOLUTION ORAL DAILY
Status: DISCONTINUED | OUTPATIENT
Start: 2021-05-14 | End: 2021-05-15 | Stop reason: HOSPADM

## 2021-05-13 RX ORDER — ONDANSETRON 2 MG/ML
INJECTION INTRAMUSCULAR; INTRAVENOUS AS NEEDED
Status: DISCONTINUED | OUTPATIENT
Start: 2021-05-13 | End: 2021-05-13 | Stop reason: SURG

## 2021-05-13 RX ORDER — LANOLIN ALCOHOL/MO/W.PET/CERES
1000 CREAM (GRAM) TOPICAL DAILY
Status: DISCONTINUED | OUTPATIENT
Start: 2021-05-14 | End: 2021-05-15 | Stop reason: HOSPADM

## 2021-05-13 RX ORDER — MEPERIDINE HYDROCHLORIDE 25 MG/ML
12.5 INJECTION INTRAMUSCULAR; INTRAVENOUS; SUBCUTANEOUS
Status: DISCONTINUED | OUTPATIENT
Start: 2021-05-13 | End: 2021-05-13 | Stop reason: HOSPADM

## 2021-05-13 RX ORDER — CEFAZOLIN SODIUM 2 G/100ML
2 INJECTION, SOLUTION INTRAVENOUS EVERY 8 HOURS
Status: COMPLETED | OUTPATIENT
Start: 2021-05-13 | End: 2021-05-14

## 2021-05-13 RX ORDER — HEPARIN SODIUM 5000 [USP'U]/ML
INJECTION, SOLUTION INTRAVENOUS; SUBCUTANEOUS AS NEEDED
Status: DISCONTINUED | OUTPATIENT
Start: 2021-05-13 | End: 2021-05-13 | Stop reason: HOSPADM

## 2021-05-13 RX ORDER — HYDROCODONE BITARTRATE AND ACETAMINOPHEN 5; 325 MG/1; MG/1
1 TABLET ORAL ONCE AS NEEDED
Status: DISCONTINUED | OUTPATIENT
Start: 2021-05-13 | End: 2021-05-13 | Stop reason: HOSPADM

## 2021-05-13 RX ORDER — GABAPENTIN 100 MG/1
100 CAPSULE ORAL 3 TIMES DAILY
Status: COMPLETED | OUTPATIENT
Start: 2021-05-13 | End: 2021-05-15

## 2021-05-13 RX ORDER — MIDAZOLAM HYDROCHLORIDE 1 MG/ML
1 INJECTION INTRAMUSCULAR; INTRAVENOUS
Status: DISCONTINUED | OUTPATIENT
Start: 2021-05-13 | End: 2021-05-13 | Stop reason: HOSPADM

## 2021-05-13 RX ORDER — EPHEDRINE SULFATE 50 MG/ML
INJECTION, SOLUTION INTRAVENOUS AS NEEDED
Status: DISCONTINUED | OUTPATIENT
Start: 2021-05-13 | End: 2021-05-13 | Stop reason: SURG

## 2021-05-13 RX ORDER — IPRATROPIUM BROMIDE AND ALBUTEROL SULFATE 2.5; .5 MG/3ML; MG/3ML
3 SOLUTION RESPIRATORY (INHALATION) ONCE AS NEEDED
Status: DISCONTINUED | OUTPATIENT
Start: 2021-05-13 | End: 2021-05-13 | Stop reason: HOSPADM

## 2021-05-13 RX ORDER — TERAZOSIN 2 MG/1
2 CAPSULE ORAL NIGHTLY
Status: DISCONTINUED | OUTPATIENT
Start: 2021-05-13 | End: 2021-05-15 | Stop reason: HOSPADM

## 2021-05-13 RX ORDER — ACETAMINOPHEN 500 MG
1000 TABLET ORAL EVERY 8 HOURS SCHEDULED
Status: DISCONTINUED | OUTPATIENT
Start: 2021-05-13 | End: 2021-05-15 | Stop reason: HOSPADM

## 2021-05-13 RX ORDER — HEPARIN SODIUM 5000 [USP'U]/ML
5000 INJECTION, SOLUTION INTRAVENOUS; SUBCUTANEOUS ONCE
Status: DISCONTINUED | OUTPATIENT
Start: 2021-05-13 | End: 2021-05-13 | Stop reason: HOSPADM

## 2021-05-13 RX ORDER — PROMETHAZINE HYDROCHLORIDE 25 MG/1
25 SUPPOSITORY RECTAL ONCE AS NEEDED
Status: DISCONTINUED | OUTPATIENT
Start: 2021-05-13 | End: 2021-05-13 | Stop reason: HOSPADM

## 2021-05-13 RX ORDER — SODIUM CHLORIDE, SODIUM LACTATE, POTASSIUM CHLORIDE, CALCIUM CHLORIDE 600; 310; 30; 20 MG/100ML; MG/100ML; MG/100ML; MG/100ML
9 INJECTION, SOLUTION INTRAVENOUS CONTINUOUS
Status: DISCONTINUED | OUTPATIENT
Start: 2021-05-13 | End: 2021-05-13 | Stop reason: HOSPADM

## 2021-05-13 RX ORDER — METOPROLOL SUCCINATE 50 MG/1
50 TABLET, EXTENDED RELEASE ORAL NIGHTLY
Status: DISCONTINUED | OUTPATIENT
Start: 2021-05-13 | End: 2021-05-15 | Stop reason: HOSPADM

## 2021-05-13 RX ORDER — HYDRALAZINE HYDROCHLORIDE 20 MG/ML
5 INJECTION INTRAMUSCULAR; INTRAVENOUS
Status: DISCONTINUED | OUTPATIENT
Start: 2021-05-13 | End: 2021-05-13 | Stop reason: HOSPADM

## 2021-05-13 RX ORDER — HEPARIN SODIUM 5000 [USP'U]/ML
5000 INJECTION, SOLUTION INTRAVENOUS; SUBCUTANEOUS EVERY 12 HOURS SCHEDULED
Status: DISCONTINUED | OUTPATIENT
Start: 2021-05-14 | End: 2021-05-15 | Stop reason: HOSPADM

## 2021-05-13 RX ORDER — DEXAMETHASONE SODIUM PHOSPHATE 4 MG/ML
INJECTION, SOLUTION INTRA-ARTICULAR; INTRALESIONAL; INTRAMUSCULAR; INTRAVENOUS; SOFT TISSUE AS NEEDED
Status: DISCONTINUED | OUTPATIENT
Start: 2021-05-13 | End: 2021-05-13 | Stop reason: SURG

## 2021-05-13 RX ORDER — SODIUM CHLORIDE 9 MG/ML
INJECTION, SOLUTION INTRAVENOUS AS NEEDED
Status: DISCONTINUED | OUTPATIENT
Start: 2021-05-13 | End: 2021-05-13 | Stop reason: HOSPADM

## 2021-05-13 RX ORDER — FENTANYL CITRATE 50 UG/ML
INJECTION, SOLUTION INTRAMUSCULAR; INTRAVENOUS AS NEEDED
Status: DISCONTINUED | OUTPATIENT
Start: 2021-05-13 | End: 2021-05-13 | Stop reason: SURG

## 2021-05-13 RX ORDER — OXYCODONE HYDROCHLORIDE 5 MG/1
10 TABLET ORAL EVERY 4 HOURS PRN
Status: DISCONTINUED | OUTPATIENT
Start: 2021-05-13 | End: 2021-05-15 | Stop reason: HOSPADM

## 2021-05-13 RX ORDER — MAGNESIUM HYDROXIDE 1200 MG/15ML
LIQUID ORAL AS NEEDED
Status: DISCONTINUED | OUTPATIENT
Start: 2021-05-13 | End: 2021-05-13 | Stop reason: HOSPADM

## 2021-05-13 RX ORDER — ROCURONIUM BROMIDE 10 MG/ML
INJECTION, SOLUTION INTRAVENOUS AS NEEDED
Status: DISCONTINUED | OUTPATIENT
Start: 2021-05-13 | End: 2021-05-13 | Stop reason: SURG

## 2021-05-13 RX ORDER — GLYCOPYRROLATE 0.2 MG/ML
INJECTION INTRAMUSCULAR; INTRAVENOUS AS NEEDED
Status: DISCONTINUED | OUTPATIENT
Start: 2021-05-13 | End: 2021-05-13 | Stop reason: SURG

## 2021-05-13 RX ORDER — ONDANSETRON 2 MG/ML
4 INJECTION INTRAMUSCULAR; INTRAVENOUS EVERY 6 HOURS PRN
Status: DISCONTINUED | OUTPATIENT
Start: 2021-05-13 | End: 2021-05-15 | Stop reason: HOSPADM

## 2021-05-13 RX ORDER — PROMETHAZINE HYDROCHLORIDE 25 MG/1
25 TABLET ORAL ONCE AS NEEDED
Status: DISCONTINUED | OUTPATIENT
Start: 2021-05-13 | End: 2021-05-13 | Stop reason: HOSPADM

## 2021-05-13 RX ORDER — LIDOCAINE HYDROCHLORIDE 10 MG/ML
INJECTION, SOLUTION EPIDURAL; INFILTRATION; INTRACAUDAL; PERINEURAL AS NEEDED
Status: DISCONTINUED | OUTPATIENT
Start: 2021-05-13 | End: 2021-05-13 | Stop reason: SURG

## 2021-05-13 RX ORDER — ROSUVASTATIN CALCIUM 10 MG/1
5 TABLET, COATED ORAL NIGHTLY
Status: DISCONTINUED | OUTPATIENT
Start: 2021-05-13 | End: 2021-05-15 | Stop reason: HOSPADM

## 2021-05-13 RX ORDER — SODIUM CHLORIDE 0.9 % (FLUSH) 0.9 %
3-10 SYRINGE (ML) INJECTION AS NEEDED
Status: DISCONTINUED | OUTPATIENT
Start: 2021-05-13 | End: 2021-05-13 | Stop reason: HOSPADM

## 2021-05-13 RX ORDER — CETIRIZINE HYDROCHLORIDE 10 MG/1
10 TABLET ORAL NIGHTLY
Status: DISCONTINUED | OUTPATIENT
Start: 2021-05-13 | End: 2021-05-15 | Stop reason: HOSPADM

## 2021-05-13 RX ORDER — DROPERIDOL 2.5 MG/ML
0.62 INJECTION, SOLUTION INTRAMUSCULAR; INTRAVENOUS AS NEEDED
Status: DISCONTINUED | OUTPATIENT
Start: 2021-05-13 | End: 2021-05-13 | Stop reason: HOSPADM

## 2021-05-13 RX ORDER — SERTRALINE HYDROCHLORIDE 100 MG/1
100 TABLET, FILM COATED ORAL NIGHTLY
Status: DISCONTINUED | OUTPATIENT
Start: 2021-05-13 | End: 2021-05-15 | Stop reason: HOSPADM

## 2021-05-13 RX ORDER — ONDANSETRON 4 MG/1
4 TABLET, FILM COATED ORAL EVERY 6 HOURS PRN
Status: DISCONTINUED | OUTPATIENT
Start: 2021-05-13 | End: 2021-05-15 | Stop reason: HOSPADM

## 2021-05-13 RX ORDER — OXYCODONE HYDROCHLORIDE 5 MG/1
5 TABLET ORAL EVERY 4 HOURS PRN
Status: DISCONTINUED | OUTPATIENT
Start: 2021-05-13 | End: 2021-05-15 | Stop reason: HOSPADM

## 2021-05-13 RX ORDER — NALOXONE HCL 0.4 MG/ML
0.4 VIAL (ML) INJECTION AS NEEDED
Status: DISCONTINUED | OUTPATIENT
Start: 2021-05-13 | End: 2021-05-13 | Stop reason: HOSPADM

## 2021-05-13 RX ORDER — PANTOPRAZOLE SODIUM 40 MG/1
40 TABLET, DELAYED RELEASE ORAL EVERY MORNING
Status: DISCONTINUED | OUTPATIENT
Start: 2021-05-14 | End: 2021-05-15 | Stop reason: HOSPADM

## 2021-05-13 RX ORDER — FENTANYL CITRATE 50 UG/ML
50 INJECTION, SOLUTION INTRAMUSCULAR; INTRAVENOUS
Status: DISCONTINUED | OUTPATIENT
Start: 2021-05-13 | End: 2021-05-13 | Stop reason: HOSPADM

## 2021-05-13 RX ORDER — SODIUM CHLORIDE 0.9 % (FLUSH) 0.9 %
3 SYRINGE (ML) INJECTION EVERY 12 HOURS SCHEDULED
Status: DISCONTINUED | OUTPATIENT
Start: 2021-05-13 | End: 2021-05-13 | Stop reason: HOSPADM

## 2021-05-13 RX ORDER — TETRACAINE HYDROCHLORIDE 5 MG/ML
2 SOLUTION OPHTHALMIC ONCE
Status: COMPLETED | OUTPATIENT
Start: 2021-05-13 | End: 2021-05-13

## 2021-05-13 RX ORDER — SODIUM CHLORIDE, SODIUM LACTATE, POTASSIUM CHLORIDE, CALCIUM CHLORIDE 600; 310; 30; 20 MG/100ML; MG/100ML; MG/100ML; MG/100ML
100 INJECTION, SOLUTION INTRAVENOUS CONTINUOUS
Status: DISCONTINUED | OUTPATIENT
Start: 2021-05-13 | End: 2021-05-15 | Stop reason: HOSPADM

## 2021-05-13 RX ORDER — SODIUM CHLORIDE 0.9 % (FLUSH) 0.9 %
10 SYRINGE (ML) INJECTION AS NEEDED
Status: DISCONTINUED | OUTPATIENT
Start: 2021-05-13 | End: 2021-05-13 | Stop reason: HOSPADM

## 2021-05-13 RX ORDER — NEOSTIGMINE METHYLSULFATE 1 MG/ML
INJECTION, SOLUTION INTRAVENOUS AS NEEDED
Status: DISCONTINUED | OUTPATIENT
Start: 2021-05-13 | End: 2021-05-13 | Stop reason: SURG

## 2021-05-13 RX ORDER — LABETALOL HYDROCHLORIDE 5 MG/ML
5 INJECTION, SOLUTION INTRAVENOUS
Status: DISCONTINUED | OUTPATIENT
Start: 2021-05-13 | End: 2021-05-13 | Stop reason: HOSPADM

## 2021-05-13 RX ORDER — LISINOPRIL 40 MG/1
40 TABLET ORAL DAILY
Status: DISCONTINUED | OUTPATIENT
Start: 2021-05-14 | End: 2021-05-15 | Stop reason: HOSPADM

## 2021-05-13 RX ORDER — GABAPENTIN 300 MG/1
600 CAPSULE ORAL ONCE
Status: COMPLETED | OUTPATIENT
Start: 2021-05-13 | End: 2021-05-13

## 2021-05-13 RX ORDER — ONDANSETRON 2 MG/ML
4 INJECTION INTRAMUSCULAR; INTRAVENOUS ONCE AS NEEDED
Status: DISCONTINUED | OUTPATIENT
Start: 2021-05-13 | End: 2021-05-13 | Stop reason: HOSPADM

## 2021-05-13 RX ORDER — HYDROMORPHONE HYDROCHLORIDE 1 MG/ML
0.5 INJECTION, SOLUTION INTRAMUSCULAR; INTRAVENOUS; SUBCUTANEOUS
Status: DISCONTINUED | OUTPATIENT
Start: 2021-05-13 | End: 2021-05-13 | Stop reason: HOSPADM

## 2021-05-13 RX ORDER — PROPOFOL 10 MG/ML
VIAL (ML) INTRAVENOUS AS NEEDED
Status: DISCONTINUED | OUTPATIENT
Start: 2021-05-13 | End: 2021-05-13 | Stop reason: SURG

## 2021-05-13 RX ORDER — ACETAMINOPHEN 500 MG
1000 TABLET ORAL ONCE
Status: COMPLETED | OUTPATIENT
Start: 2021-05-13 | End: 2021-05-13

## 2021-05-13 RX ORDER — CEFAZOLIN SODIUM 2 G/100ML
2 INJECTION, SOLUTION INTRAVENOUS ONCE
Status: COMPLETED | OUTPATIENT
Start: 2021-05-13 | End: 2021-05-13

## 2021-05-13 RX ORDER — DROPERIDOL 2.5 MG/ML
0.62 INJECTION, SOLUTION INTRAMUSCULAR; INTRAVENOUS ONCE AS NEEDED
Status: DISCONTINUED | OUTPATIENT
Start: 2021-05-13 | End: 2021-05-13 | Stop reason: HOSPADM

## 2021-05-13 RX ORDER — BUPIVACAINE HYDROCHLORIDE AND EPINEPHRINE 5; 5 MG/ML; UG/ML
INJECTION, SOLUTION PERINEURAL AS NEEDED
Status: DISCONTINUED | OUTPATIENT
Start: 2021-05-13 | End: 2021-05-13 | Stop reason: HOSPADM

## 2021-05-13 RX ADMIN — CETIRIZINE HYDROCHLORIDE 10 MG: 10 TABLET, FILM COATED ORAL at 20:26

## 2021-05-13 RX ADMIN — GLYCOPYRROLATE 0.2 MG: 0.4 INJECTION INTRAMUSCULAR; INTRAVENOUS at 12:59

## 2021-05-13 RX ADMIN — AMITRIPTYLINE HYDROCHLORIDE 10 MG: 10 TABLET, FILM COATED ORAL at 20:25

## 2021-05-13 RX ADMIN — GABAPENTIN 100 MG: 100 CAPSULE ORAL at 17:12

## 2021-05-13 RX ADMIN — FAMOTIDINE 20 MG: 20 TABLET ORAL at 12:02

## 2021-05-13 RX ADMIN — ACETAMINOPHEN 1000 MG: 500 TABLET, FILM COATED ORAL at 17:12

## 2021-05-13 RX ADMIN — EPHEDRINE SULFATE 5 MG: 50 INJECTION, SOLUTION INTRAVENOUS at 13:11

## 2021-05-13 RX ADMIN — CEFAZOLIN SODIUM 2 G: 2 INJECTION, SOLUTION INTRAVENOUS at 12:49

## 2021-05-13 RX ADMIN — TETRACAINE HYDROCHLORIDE 2 DROP: 5 SOLUTION OPHTHALMIC at 18:04

## 2021-05-13 RX ADMIN — TERAZOSIN HYDROCHLORIDE 2 MG: 2 CAPSULE ORAL at 20:26

## 2021-05-13 RX ADMIN — SODIUM CHLORIDE, POTASSIUM CHLORIDE, SODIUM LACTATE AND CALCIUM CHLORIDE 100 ML/HR: 600; 310; 30; 20 INJECTION, SOLUTION INTRAVENOUS at 17:13

## 2021-05-13 RX ADMIN — CEFAZOLIN SODIUM 2 G: 2 INJECTION, SOLUTION INTRAVENOUS at 20:34

## 2021-05-13 RX ADMIN — METOPROLOL SUCCINATE 50 MG: 50 TABLET, FILM COATED, EXTENDED RELEASE ORAL at 20:26

## 2021-05-13 RX ADMIN — FENTANYL CITRATE 50 MCG: 50 INJECTION, SOLUTION INTRAMUSCULAR; INTRAVENOUS at 15:30

## 2021-05-13 RX ADMIN — FENTANYL CITRATE 50 MCG: 50 INJECTION, SOLUTION INTRAMUSCULAR; INTRAVENOUS at 12:47

## 2021-05-13 RX ADMIN — ACETAMINOPHEN 1000 MG: 500 TABLET ORAL at 12:05

## 2021-05-13 RX ADMIN — ROSUVASTATIN CALCIUM 5 MG: 10 TABLET, COATED ORAL at 20:25

## 2021-05-13 RX ADMIN — ONDANSETRON 4 MG: 2 INJECTION INTRAMUSCULAR; INTRAVENOUS at 13:59

## 2021-05-13 RX ADMIN — GABAPENTIN 100 MG: 100 CAPSULE ORAL at 20:26

## 2021-05-13 RX ADMIN — SERTRALINE HYDROCHLORIDE 100 MG: 100 TABLET ORAL at 20:26

## 2021-05-13 RX ADMIN — FENTANYL CITRATE 25 MCG: 50 INJECTION, SOLUTION INTRAMUSCULAR; INTRAVENOUS at 14:40

## 2021-05-13 RX ADMIN — LIDOCAINE HYDROCHLORIDE 0.2 ML: 10 INJECTION, SOLUTION EPIDURAL; INFILTRATION; INTRACAUDAL; PERINEURAL at 12:01

## 2021-05-13 RX ADMIN — LIDOCAINE HYDROCHLORIDE 50 MG: 10 INJECTION, SOLUTION EPIDURAL; INFILTRATION; INTRACAUDAL; PERINEURAL at 12:47

## 2021-05-13 RX ADMIN — GABAPENTIN 600 MG: 300 CAPSULE ORAL at 12:05

## 2021-05-13 RX ADMIN — SODIUM CHLORIDE, POTASSIUM CHLORIDE, SODIUM LACTATE AND CALCIUM CHLORIDE 9 ML/HR: 600; 310; 30; 20 INJECTION, SOLUTION INTRAVENOUS at 12:01

## 2021-05-13 RX ADMIN — FENTANYL CITRATE 50 MCG: 50 INJECTION, SOLUTION INTRAMUSCULAR; INTRAVENOUS at 15:45

## 2021-05-13 RX ADMIN — NEOSTIGMINE 1 MG: 1 INJECTION INTRAVENOUS at 14:37

## 2021-05-13 RX ADMIN — GLYCOPYRROLATE 0.2 MG: 0.4 INJECTION INTRAMUSCULAR; INTRAVENOUS at 14:37

## 2021-05-13 RX ADMIN — ROCURONIUM BROMIDE 40 MG: 10 INJECTION INTRAVENOUS at 12:47

## 2021-05-13 RX ADMIN — DEXAMETHASONE SODIUM PHOSPHATE 8 MG: 4 INJECTION, SOLUTION INTRA-ARTICULAR; INTRALESIONAL; INTRAMUSCULAR; INTRAVENOUS; SOFT TISSUE at 12:51

## 2021-05-13 RX ADMIN — OXYCODONE HYDROCHLORIDE 10 MG: 5 TABLET ORAL at 20:25

## 2021-05-13 RX ADMIN — FENTANYL CITRATE 25 MCG: 50 INJECTION, SOLUTION INTRAMUSCULAR; INTRAVENOUS at 14:06

## 2021-05-13 RX ADMIN — PROPOFOL 200 MG: 10 INJECTION, EMULSION INTRAVENOUS at 12:47

## 2021-05-14 LAB
DEPRECATED RDW RBC AUTO: 40.3 FL (ref 37–54)
ERYTHROCYTE [DISTWIDTH] IN BLOOD BY AUTOMATED COUNT: 11.8 % (ref 12.3–15.4)
HCT VFR BLD AUTO: 37.8 % (ref 34–46.6)
HGB BLD-MCNC: 12.6 G/DL (ref 12–15.9)
MCH RBC QN AUTO: 31.3 PG (ref 26.6–33)
MCHC RBC AUTO-ENTMCNC: 33.3 G/DL (ref 31.5–35.7)
MCV RBC AUTO: 93.8 FL (ref 79–97)
PLATELET # BLD AUTO: 211 10*3/MM3 (ref 140–450)
PMV BLD AUTO: 9.7 FL (ref 6–12)
RBC # BLD AUTO: 4.03 10*6/MM3 (ref 3.77–5.28)
WBC # BLD AUTO: 12.22 10*3/MM3 (ref 3.4–10.8)

## 2021-05-14 PROCEDURE — 85027 COMPLETE CBC AUTOMATED: CPT | Performed by: OBSTETRICS & GYNECOLOGY

## 2021-05-14 PROCEDURE — 25010000002 HEPARIN (PORCINE) PER 1000 UNITS: Performed by: OBSTETRICS & GYNECOLOGY

## 2021-05-14 PROCEDURE — 94799 UNLISTED PULMONARY SVC/PX: CPT

## 2021-05-14 PROCEDURE — 25010000003 CEFAZOLIN IN DEXTROSE 2-4 GM/100ML-% SOLUTION: Performed by: OBSTETRICS & GYNECOLOGY

## 2021-05-14 PROCEDURE — G0378 HOSPITAL OBSERVATION PER HR: HCPCS

## 2021-05-14 RX ADMIN — ACETAMINOPHEN 1000 MG: 500 TABLET, FILM COATED ORAL at 06:04

## 2021-05-14 RX ADMIN — GABAPENTIN 100 MG: 100 CAPSULE ORAL at 08:34

## 2021-05-14 RX ADMIN — METOPROLOL SUCCINATE 50 MG: 50 TABLET, FILM COATED, EXTENDED RELEASE ORAL at 21:11

## 2021-05-14 RX ADMIN — Medication 1000 UNITS: at 08:34

## 2021-05-14 RX ADMIN — Medication 1000 MCG: at 08:34

## 2021-05-14 RX ADMIN — POLYETHYLENE GLYCOL 3350 17 G: 17 POWDER, FOR SOLUTION ORAL at 08:33

## 2021-05-14 RX ADMIN — GABAPENTIN 100 MG: 100 CAPSULE ORAL at 20:28

## 2021-05-14 RX ADMIN — CEFAZOLIN SODIUM 2 G: 2 INJECTION, SOLUTION INTRAVENOUS at 04:02

## 2021-05-14 RX ADMIN — HEPARIN SODIUM 5000 UNITS: 5000 INJECTION INTRAVENOUS; SUBCUTANEOUS at 20:30

## 2021-05-14 RX ADMIN — TERAZOSIN HYDROCHLORIDE 2 MG: 2 CAPSULE ORAL at 20:28

## 2021-05-14 RX ADMIN — ACETAMINOPHEN 1000 MG: 500 TABLET, FILM COATED ORAL at 14:26

## 2021-05-14 RX ADMIN — ROSUVASTATIN CALCIUM 5 MG: 10 TABLET, COATED ORAL at 20:28

## 2021-05-14 RX ADMIN — ACETAMINOPHEN 1000 MG: 500 TABLET, FILM COATED ORAL at 22:32

## 2021-05-14 RX ADMIN — SODIUM CHLORIDE, POTASSIUM CHLORIDE, SODIUM LACTATE AND CALCIUM CHLORIDE 100 ML/HR: 600; 310; 30; 20 INJECTION, SOLUTION INTRAVENOUS at 03:06

## 2021-05-14 RX ADMIN — SERTRALINE HYDROCHLORIDE 100 MG: 100 TABLET ORAL at 20:28

## 2021-05-14 RX ADMIN — CETIRIZINE HYDROCHLORIDE 10 MG: 10 TABLET, FILM COATED ORAL at 20:28

## 2021-05-14 RX ADMIN — AMITRIPTYLINE HYDROCHLORIDE 10 MG: 10 TABLET, FILM COATED ORAL at 20:28

## 2021-05-14 RX ADMIN — GABAPENTIN 100 MG: 100 CAPSULE ORAL at 16:15

## 2021-05-14 RX ADMIN — SODIUM CHLORIDE, POTASSIUM CHLORIDE, SODIUM LACTATE AND CALCIUM CHLORIDE 100 ML/HR: 600; 310; 30; 20 INJECTION, SOLUTION INTRAVENOUS at 14:26

## 2021-05-14 RX ADMIN — HEPARIN SODIUM 5000 UNITS: 5000 INJECTION INTRAVENOUS; SUBCUTANEOUS at 08:33

## 2021-05-14 RX ADMIN — MELOXICAM 7.5 MG: 7.5 TABLET ORAL at 08:34

## 2021-05-14 RX ADMIN — PANTOPRAZOLE SODIUM 40 MG: 40 TABLET, DELAYED RELEASE ORAL at 06:04

## 2021-05-15 VITALS
DIASTOLIC BLOOD PRESSURE: 62 MMHG | SYSTOLIC BLOOD PRESSURE: 130 MMHG | WEIGHT: 186.3 LBS | BODY MASS INDEX: 28.33 KG/M2 | TEMPERATURE: 98.5 F | HEART RATE: 62 BPM | OXYGEN SATURATION: 91 % | RESPIRATION RATE: 20 BRPM

## 2021-05-15 PROCEDURE — G0378 HOSPITAL OBSERVATION PER HR: HCPCS

## 2021-05-15 PROCEDURE — 94799 UNLISTED PULMONARY SVC/PX: CPT

## 2021-05-15 PROCEDURE — 25010000002 HEPARIN (PORCINE) PER 1000 UNITS: Performed by: OBSTETRICS & GYNECOLOGY

## 2021-05-15 RX ORDER — GABAPENTIN 100 MG/1
100 CAPSULE ORAL 3 TIMES DAILY
Qty: 42 CAPSULE | Refills: 0 | Status: SHIPPED | OUTPATIENT
Start: 2021-05-15 | End: 2022-03-23

## 2021-05-15 RX ORDER — ACETAMINOPHEN 500 MG
1000 TABLET ORAL EVERY 8 HOURS SCHEDULED
Qty: 100 TABLET | Refills: 0 | Status: SHIPPED | OUTPATIENT
Start: 2021-05-15 | End: 2021-07-10

## 2021-05-15 RX ORDER — OXYCODONE HYDROCHLORIDE 5 MG/1
5 TABLET ORAL EVERY 4 HOURS PRN
Qty: 10 TABLET | Refills: 0 | Status: SHIPPED | OUTPATIENT
Start: 2021-05-15 | End: 2021-07-10

## 2021-05-15 RX ORDER — MELOXICAM 7.5 MG/1
7.5 TABLET ORAL DAILY
Qty: 20 TABLET | Refills: 0 | Status: SHIPPED | OUTPATIENT
Start: 2021-05-15 | End: 2021-06-04

## 2021-05-15 RX ORDER — POLYETHYLENE GLYCOL 3350 17 G/17G
17 POWDER, FOR SOLUTION ORAL DAILY
Qty: 238 G | Refills: 1 | Status: SHIPPED | OUTPATIENT
Start: 2021-05-15

## 2021-05-15 RX ADMIN — Medication 1000 MCG: at 08:22

## 2021-05-15 RX ADMIN — MELOXICAM 7.5 MG: 7.5 TABLET ORAL at 08:22

## 2021-05-15 RX ADMIN — POLYETHYLENE GLYCOL 3350 17 G: 17 POWDER, FOR SOLUTION ORAL at 08:22

## 2021-05-15 RX ADMIN — ACETAMINOPHEN 1000 MG: 500 TABLET, FILM COATED ORAL at 06:02

## 2021-05-15 RX ADMIN — HEPARIN SODIUM 5000 UNITS: 5000 INJECTION INTRAVENOUS; SUBCUTANEOUS at 08:22

## 2021-05-15 RX ADMIN — GABAPENTIN 100 MG: 100 CAPSULE ORAL at 08:22

## 2021-05-15 RX ADMIN — Medication 1000 UNITS: at 08:22

## 2021-05-15 RX ADMIN — PANTOPRAZOLE SODIUM 40 MG: 40 TABLET, DELAYED RELEASE ORAL at 06:02

## 2021-05-24 RX ORDER — METOPROLOL SUCCINATE 50 MG/1
TABLET, EXTENDED RELEASE ORAL
Qty: 90 TABLET | Refills: 3 | Status: SHIPPED | OUTPATIENT
Start: 2021-05-24 | End: 2022-03-23 | Stop reason: SDUPTHER

## 2021-06-10 RX ORDER — TERAZOSIN 2 MG/1
CAPSULE ORAL
Qty: 90 CAPSULE | Refills: 3 | Status: SHIPPED | OUTPATIENT
Start: 2021-06-10 | End: 2022-03-23 | Stop reason: SDUPTHER

## 2021-06-11 RX ORDER — TERAZOSIN 2 MG/1
CAPSULE ORAL
Qty: 90 CAPSULE | Refills: 2 | OUTPATIENT
Start: 2021-06-11

## 2021-07-10 PROCEDURE — U0003 INFECTIOUS AGENT DETECTION BY NUCLEIC ACID (DNA OR RNA); SEVERE ACUTE RESPIRATORY SYNDROME CORONAVIRUS 2 (SARS-COV-2) (CORONAVIRUS DISEASE [COVID-19]), AMPLIFIED PROBE TECHNIQUE, MAKING USE OF HIGH THROUGHPUT TECHNOLOGIES AS DESCRIBED BY CMS-2020-01-R: HCPCS | Performed by: NURSE PRACTITIONER

## 2021-09-03 ENCOUNTER — TRANSCRIBE ORDERS (OUTPATIENT)
Dept: PHYSICAL THERAPY | Facility: CLINIC | Age: 61
End: 2021-09-03

## 2021-09-03 DIAGNOSIS — N94.19 OTHER SPECIFIED DYSPAREUNIA: Primary | ICD-10-CM

## 2021-10-25 RX ORDER — ROSUVASTATIN CALCIUM 5 MG/1
TABLET, COATED ORAL
Qty: 90 TABLET | Refills: 3 | Status: SHIPPED | OUTPATIENT
Start: 2021-10-25 | End: 2022-03-23 | Stop reason: SDUPTHER

## 2022-01-14 ENCOUNTER — TREATMENT (OUTPATIENT)
Dept: PHYSICAL THERAPY | Facility: CLINIC | Age: 62
End: 2022-01-14

## 2022-01-14 DIAGNOSIS — N94.89 HIGH-TONE PELVIC FLOOR DYSFUNCTION: ICD-10-CM

## 2022-01-14 DIAGNOSIS — N94.19 OTHER SPECIFIED DYSPAREUNIA: Primary | ICD-10-CM

## 2022-01-14 DIAGNOSIS — L90.5 SCAR ADHERENT: ICD-10-CM

## 2022-01-14 DIAGNOSIS — M62.838 MUSCLE SPASM: ICD-10-CM

## 2022-01-14 PROCEDURE — 97162 PT EVAL MOD COMPLEX 30 MIN: CPT | Performed by: PHYSICAL THERAPIST

## 2022-01-14 PROCEDURE — 97530 THERAPEUTIC ACTIVITIES: CPT | Performed by: PHYSICAL THERAPIST

## 2022-01-14 NOTE — PROGRESS NOTES
"Physical Therapy Initial Evaluation and Plan of Care    Patient: Carolyn Chaparro   : 1960  Diagnosis/ICD-10 Code:  Other specified dyspareunia [N94.19]  Referring practitioner: Thelma Mortensen MD  Date of Initial Visit: 2022  Today's Date: 2022  Patient seen for 1 sessions      Subjective    Pt reports she had surgery May 2021. Had sacrocolpoplexy. Since then during sex when has penetration feels like vagina is not deep enough, hits a wall and is extremely painful. Feels like not going in straight.       Chief Complaint:   Chief Complaint   Patient presents with   • Initial Evaluation      Functional Outcome Measure: VQ    Pain  denies other than with intercourse      Bladder function:  Denies issues      Bowel function:  Was having difficulty voiding bowels and going only small amounts prior to surgery; now voiding normally          Sexual activity  Sexually active: yes;   Pain with penetration: Pain: 8  Deeper; 2/10 with initial penetration, sharp  Pain after sex: Stops when they stop  Deeper penetration makes it worse      Prior PT or other treatment: none    Diagnostics:    PMH:   Past Medical History:   Diagnosis Date   • Anxiety    • Chronic constipation    • GERD (gastroesophageal reflux disease)    • Headache    • Heart palpitations    • Hypertension    • Osteopenia    • Post-menopause on HRT (hormone replacement therapy)     not currently   • PVCs (premature ventricular contractions)     hx   • Spontaneous dissection of coronary artery     \"attempted stent, but heart re-routed itself\"         Surgical HX:   Past Surgical History:   Procedure Laterality Date   • CARDIAC CATHETERIZATION N/A 10/2/2018    Procedure: Left Heart Cath;  Surgeon: Hellen Allan MD;  Location: [facility] INVASIVE LOCATION;  Service: Cardiology   • COLONOSCOPY     • LAPAROSCOPIC ASSISTED VAGINAL HYSTERECTOMY SALPINGO OOPHORECTOMY Bilateral    • LAPAROSCOPY WITH LASER     • PERINEOPLASTY     • " POSTERIOR REPAIR     • SACROCOLPOPEXY N/A 5/13/2021    Procedure: SACROCOLPOPEXY LAPAROSCOPIC WITH ROBOTIC WITH MID URETHRAL SLING  CYSTOSCOPY POSTERIOR REPAIR;  Surgeon: Thelma Mortensen MD;  Location: [facility];  Service: Robotics - [facility];  Laterality: N/A;        Menstrual cycle: n/a        Meds:   Current Outpatient Medications:   •  amitriptyline (ELAVIL) 10 MG tablet, Take 10 mg by mouth Every Night., Disp: , Rfl:   •  amoxicillin-clavulanate (Augmentin) 875-125 MG per tablet, Take 1 tablet by mouth Every 12 (Twelve) Hours., Disp: 20 tablet, Rfl: 0  •  aspirin 81 MG EC tablet, Take 81 mg by mouth Daily., Disp: , Rfl:   •  cetirizine (zyrTEC) 10 MG tablet, Take 10 mg by mouth Daily., Disp: , Rfl:   •  cholecalciferol (VITAMIN D3) 1000 units tablet, Take 1,000 Units by mouth Daily., Disp: , Rfl:   •  gabapentin (NEURONTIN) 100 MG capsule, Take 1 capsule by mouth 3 Times a Day for 7 days on a schedule, then only as needed., Disp: 42 capsule, Rfl: 0  •  lisinopril (PRINIVIL,ZESTRIL) 40 MG tablet, Take 1 tablet by mouth Daily., Disp: 90 tablet, Rfl: 3  •  methylPREDNISolone (MEDROL) 4 MG dose pack, Take as directed on package instructions., Disp: 21 tablet, Rfl: 0  •  metoprolol succinate XL (TOPROL-XL) 50 MG 24 hr tablet, TAKE ONE TABLET BY MOUTH DAILY, Disp: 90 tablet, Rfl: 3  •  Multiple Vitamins-Minerals (MULTIVITAMIN ADULT PO), Take 1 tablet by mouth Daily., Disp: , Rfl:   •  pantoprazole (PROTONIX) 40 MG EC tablet, Take 40 mg by mouth Daily., Disp: , Rfl:   •  polyethylene glycol (MIRALAX) 17 GM/SCOOP powder, Dissolve 1 capful (17g) in a glass of water and drink daily x 14 days.  may hold for loose stool., Disp: 238 g, Rfl: 1  •  Probiotic Product (PROBIOTIC ADVANCED PO), Take 1 tablet by mouth Daily., Disp: , Rfl:   •  rosuvastatin (CRESTOR) 5 MG tablet, TAKE ONE TABLET BY MOUTH ONCE NIGHTLY, Disp: 90 tablet, Rfl: 3  •  sertraline (ZOLOFT) 100 MG tablet, Take 100 mg by mouth Daily., Disp: , Rfl:   •   terazosin (HYTRIN) 2 MG capsule, TAKE ONE CAPSULE BY MOUTH ONCE NIGHTLY. STOP LISINOPRIL, Disp: 90 capsule, Rfl: 3  •  vitamin B-12 (CYANOCOBALAMIN) 500 MCG tablet, Take 1,000 mcg by mouth Daily., Disp: , Rfl:      Occupation:       Objective    Patient independently ambulates into clinic.   verbal consent obtained for internal pelvic exam/treatment with declined need for second person in room     Palpation:   Supine:   Abdominals, Iliacus, psoas - moderate tension , mild L; 3/10 TTP R  Adductors - mild tension proximally B     External:               Ischiocavernosus - mild tension B              Skin intact- no gross abnormalities              Sensation intact to light touch at perineum B     Internal:   Sensation: intact  Bulge: incomplete  Knack: intact  Wall Laxity: unremarkable                 L/R supf mm: Moderate tension B; 2/10 TTP              Levator ani: Moderate tension L>R; 6/10 TTP; with reproduction of pain with intercourse              Mild scar tissue restriction anterior vaginal wall and apex     MMT: 3/5     See flowsheet for details of treatment following evaluation.       Basic information was provided regarding pelvic floor anatomy, explanation of the function of the pelvic floor/contribution to symptoms, interaction between diaphragm and PFM.       Assessment/Plan:      The patient is a 61 y.o. female who presents to physical therapy today for pain with intercourse. Upon initial evaluation, the patient demonstrates the following impairments: moderate tension of abdominal, hip and pelvic floor mm, with scar tissue internally - mild restrictions.  Due to these impairments, the patient is unable to have sex without pain. The patient would benefit from skilled pelvic PT services to address functional limitations and impairments and to improve patient quality of life.       Goals:   STG's: 4 weeks  · Patient will report > 25% reduction in overall pain   · Patient will be able to actively  relax PFM 5/5 trials  · Patient will be able to tolerate full internal treatment/exam with no more than 3/10 discomfort for progress toward LTG.  · Patient will be independent with dilator program  · Patient will be able to perform HEP with minimal verbal cues     LTG's: By discharge  · Patient will report a decrease in pain to < 2/10 with intercourse for improved intimacy with partner.   · Patient will be independent with HEP                 Plan  Therapy options: will be seen for skilled physical therapy services  Planned modality interventions: TENS, ultrasound, cryotherapy, thermotherapy (hydrocollator packs)  Planned therapy interventions: abdominal trunk stabilization, manual therapy, neuromuscular re-education, body mechanics training, flexibility, functional ROM exercises, gait training, home exercise program, joint mobilization, therapeutic activities, stretching, strengthening, spinal/joint mobilization, soft tissue mobilization and postural training, dry needling  Pt prognosis: Good  Frequency: Weekly  Duration in visits: 12  Duration in weeks: 12  Treatment plan discussed with: patient     Treatment Time: 0815 - 0900  Timed:         Manual Therapy:    0     mins  35050;     Therapeutic Exercise:    0     mins  96429;     Neuromuscular Aileen:    0    mins  63771;    Therapeutic Activity:     8     mins  43596;     Gait Trainin     mins  50889;     Ultrasound:     0     mins  21849;    Ionto                               0    mins   15066  Self Care                       0     mins   83670  Canalith Repos    0     mins 06073      Un-Timed:  Electrical Stimulation:    0     mins  62153 ( );  Dry Needling     0     mins self-pay  Traction     0     mins 22627  Low Eval     0     Mins  41768  Mod Eval     35     Mins  57583  High Eval                       0     Mins  43939  Re-Eval                           0    mins  21631        Timed Treatment:   8   mins   Total Treatment:     43    mins    PT SIGNATURE:   Arnulfo Acuña PT   [place] License # [id number]    DATE TREATMENT INITIATED: 01/14/2022    Initial Certification  Certification Period: 4/16/2022  I certify that the therapy services are furnished while this patient is under my care.  The services outlined above are required by this patient, and will be reviewed every 90 days.     PHYSICIAN: Thelma Mortensen MD      DATE: 01/14/2022     Please sign and return via fax to [phone]. Thank you, [facility] Physical Therapy.

## 2022-03-21 DIAGNOSIS — E78.5 HYPERLIPIDEMIA LDL GOAL <70: Primary | ICD-10-CM

## 2022-03-22 NOTE — PROGRESS NOTES
[facility] Cardiology    Patient ID: Carolyn Chaparro is a 61 y.o. female.  : 1960   Contact: [phone]    Encounter date: 2022    PCP: Erika Hernandez PA      Chief complaint:   Chief Complaint   Patient presents with   • Spontaneous dissection of coronary artery       Problem List:  1. Coronary artery dissection:  a. LHC/NSTEMI, 10/02/2018: Spontaneous circumflex dissection with improvement in flow initially following an attempted wiring of the vessel. With ongoing attempts at advancing the wire however the dissection propagated proximally. With the use of Cardene, nitroglycerin, and Integrilin flow was improved. No evidence of LAD or RCA disease.  b. Echo, 10/02/2018: EF 60%. Trace Mr and TR.  c. 24 hour Holter, 2018: occasional PVC's, rare couplets, rare PAC's, brief atrial tachycardia, 13 beats.  d. Echocardiogram, 2019: EF 60%. LV systolic function is normal. No wall motion normalities are identified.  2. Palpitations:  a. 2 week monitor, 2019: Occasional PVCs (1.8%), occasional couplets. Brief atach, longest 14 beats.  3. Hypertension.  4. Hyperlipidemia.  5. GERD.    Allergies   Allergen Reactions   • Bisoprolol Other (See Comments)     Ineffective in controlling palps   • Erythromycin Diarrhea       Current Medications:    Current Outpatient Medications:   •  amitriptyline (ELAVIL) 10 MG tablet, Take 10 mg by mouth Every Night., Disp: , Rfl:   •  aspirin 81 MG EC tablet, Take 81 mg by mouth Daily., Disp: , Rfl:   •  cetirizine (zyrTEC) 10 MG tablet, Take 10 mg by mouth Daily., Disp: , Rfl:   •  cholecalciferol (VITAMIN D3) 1000 units tablet, Take 1,000 Units by mouth Daily., Disp: , Rfl:   •  metoprolol succinate XL (TOPROL-XL) 50 MG 24 hr tablet, TAKE ONE TABLET BY MOUTH DAILY, Disp: 90 tablet, Rfl: 3  •  Multiple Vitamins-Minerals (MULTIVITAMIN ADULT PO), Take 1 tablet by mouth Daily., Disp: , Rfl:   •  pantoprazole (PROTONIX)  "40 MG EC tablet, Take 40 mg by mouth Daily., Disp: , Rfl:   •  polyethylene glycol (MIRALAX) 17 GM/SCOOP powder, Dissolve 1 capful (17g) in a glass of water and drink daily x 14 days.  may hold for loose stool., Disp: 238 g, Rfl: 1  •  Probiotic Product (PROBIOTIC ADVANCED PO), Take 1 tablet by mouth Daily., Disp: , Rfl:   •  rosuvastatin (CRESTOR) 5 MG tablet, TAKE ONE TABLET BY MOUTH ONCE NIGHTLY, Disp: 90 tablet, Rfl: 3  •  sertraline (ZOLOFT) 100 MG tablet, Take 100 mg by mouth Daily., Disp: , Rfl:   •  sertraline (ZOLOFT) 25 MG tablet, Take 1 tablet by mouth Daily., Disp: , Rfl:   •  terazosin (HYTRIN) 2 MG capsule, TAKE ONE CAPSULE BY MOUTH ONCE NIGHTLY. STOP LISINOPRIL, Disp: 90 capsule, Rfl: 3  •  vitamin B-12 (CYANOCOBALAMIN) 500 MCG tablet, Take 1,000 mcg by mouth Daily., Disp: , Rfl:     HPI    Carolyn Chaparro is a 61 y.o. female who presents today for a follow up of coronary artery disease, palpitations, and cardiac risk factors. Since last visit, the patient has been doing well overall from a cardiovascular standpoint. She denies recurrence of palpitations but does have some fatigue. She walks her dogs but does not have a routine exercise plan. She had labs drawn at [facility] this morning. She asks for a PCP within [facility]'s system. Patient denies chest pain, shortness of breath, edema, dizziness, and syncope.     The following portions of the patient's history were reviewed and updated as appropriate: allergies, current medications and problem list.    Pertinent positives as listed in the HPI.  All other systems reviewed are negative.         Vitals:    03/23/22 1106   BP: 110/66   BP Location: Left arm   Patient Position: Sitting   Pulse: 50   SpO2: 99%   Weight: 83.8 kg (184 lb 12.8 oz)   Height: 172.7 cm (68\")       Physical Exam:  General: Alert and oriented.  Neck: Jugular venous pressure is within normal limits. Carotids have normal upstrokes without bruits.   Cardiovascular: " Heart has a nondisplaced focal PMI. Regular rate and rhythm. No murmur, gallop or rub.  Lungs: Clear, no rales or wheezes. Equal expansion is noted.   Extremities: Show no edema.  Skin: Warm and dry.  Neurologic: Nonfocal.     Diagnostic Data (reviewed with patient):  Lab Results   Component Value Date    GLUCOSE 97 02/09/2021    BUN 14 02/09/2021    CREATININE 0.64 02/09/2021    EGFRIFNONA 95 02/09/2021    BCR 21.9 02/09/2021     02/09/2021    K 4.8 05/11/2021     02/09/2021    CO2 27.8 02/09/2021    CALCIUM 9.1 02/09/2021    ALBUMIN 4.00 02/09/2021    ALKPHOS 62 02/09/2021    AST 25 02/09/2021    ALT 27 02/09/2021     Lab Results   Component Value Date    CHOL 126 02/09/2021    TRIG 74 02/09/2021    HDL 47 02/09/2021    LDL 64 02/09/2021      Lab Results   Component Value Date    WBC 12.22 (H) 05/14/2021    RBC 4.03 05/14/2021    HGB 12.6 05/14/2021    HCT 37.8 05/14/2021    MCV 93.8 05/14/2021     05/14/2021        Procedures      Assessment:    ICD-10-CM ICD-9-CM   1. Spontaneous dissection of coronary artery  I25.42 414.12   2. Premature ventricular contractions  I49.3 427.69   3. Essential hypertension  I10 401.9   4. Hyperlipidemia LDL goal <70  E78.5 272.4         Plan:  1. Patient may alternate between full metoprolol 50 mg and half tablet for one week then take 25 mg daily to allow increase in HR and improve fatigue. If palpitations do not return she can take half tablet daily.     2. Will obtain labs from [facility].   3. Provided her with list of PCP's within [facility].  4. Continue on aspirin 81 mg daily for antiplatelet therapy.  5. Continue on terazosin 2 mg daily for hypertension.   6. Continue on rosuvastatin 5 mg daily for hyperlipidemia.   7. Continue all other current medications.  8. F/up in 12 months, sooner if needed.    Scribed for Hellen Allan MD by Solange Cazares. 3/23/2022 11:14 EDT    I Hellen Allan MD personally performed the services  described in this documentation as scribed by the above individual in my presence, and it is both accurate and complete.    Hellen Allan MD, FACC

## 2022-03-23 ENCOUNTER — LAB (OUTPATIENT)
Dept: LAB | Facility: HOSPITAL | Age: 62
End: 2022-03-23

## 2022-03-23 ENCOUNTER — OFFICE VISIT (OUTPATIENT)
Dept: CARDIOLOGY | Facility: CLINIC | Age: 62
End: 2022-03-23

## 2022-03-23 VITALS
WEIGHT: 184.8 LBS | HEIGHT: 68 IN | OXYGEN SATURATION: 99 % | DIASTOLIC BLOOD PRESSURE: 66 MMHG | SYSTOLIC BLOOD PRESSURE: 110 MMHG | HEART RATE: 50 BPM | BODY MASS INDEX: 28.01 KG/M2

## 2022-03-23 DIAGNOSIS — I49.3 PREMATURE VENTRICULAR CONTRACTIONS: ICD-10-CM

## 2022-03-23 DIAGNOSIS — I10 ESSENTIAL HYPERTENSION: ICD-10-CM

## 2022-03-23 DIAGNOSIS — I25.42 SPONTANEOUS DISSECTION OF CORONARY ARTERY: Primary | ICD-10-CM

## 2022-03-23 DIAGNOSIS — E78.5 HYPERLIPIDEMIA LDL GOAL <70: ICD-10-CM

## 2022-03-23 LAB
ALBUMIN SERPL-MCNC: 4.8 G/DL (ref 3.5–5.2)
ALBUMIN/GLOB SERPL: 2.1 G/DL
ALP SERPL-CCNC: 72 U/L (ref 39–117)
ALT SERPL W P-5'-P-CCNC: 37 U/L (ref 1–33)
ANION GAP SERPL CALCULATED.3IONS-SCNC: 9.2 MMOL/L (ref 5–15)
AST SERPL-CCNC: 40 U/L (ref 1–32)
BILIRUB SERPL-MCNC: 0.3 MG/DL (ref 0–1.2)
BUN SERPL-MCNC: 12 MG/DL (ref 6–20)
BUN/CREAT SERPL: 14.1 (ref 7–25)
CALCIUM SPEC-SCNC: 9.6 MG/DL (ref 8.6–10.5)
CHLORIDE SERPL-SCNC: 102 MMOL/L (ref 98–107)
CHOLEST SERPL-MCNC: 124 MG/DL (ref 0–200)
CO2 SERPL-SCNC: 27.8 MMOL/L (ref 22–29)
CREAT SERPL-MCNC: 0.85 MG/DL (ref 0.57–1)
EGFRCR SERPLBLD CKD-EPI 2021: 79 ML/MIN/1.73
GLOBULIN UR ELPH-MCNC: 2.3 GM/DL
GLUCOSE SERPL-MCNC: 112 MG/DL (ref 65–99)
HDLC SERPL-MCNC: 42 MG/DL (ref 40–60)
LDLC SERPL CALC-MCNC: 64 MG/DL (ref 0–100)
LDLC/HDLC SERPL: 1.49 {RATIO}
POTASSIUM SERPL-SCNC: 4.6 MMOL/L (ref 3.5–5.2)
PROT SERPL-MCNC: 7.1 G/DL (ref 6–8.5)
SODIUM SERPL-SCNC: 139 MMOL/L (ref 136–145)
TRIGL SERPL-MCNC: 97 MG/DL (ref 0–150)
VLDLC SERPL-MCNC: 18 MG/DL (ref 5–40)

## 2022-03-23 PROCEDURE — 36415 COLL VENOUS BLD VENIPUNCTURE: CPT

## 2022-03-23 PROCEDURE — 80061 LIPID PANEL: CPT

## 2022-03-23 PROCEDURE — 99214 OFFICE O/P EST MOD 30 MIN: CPT | Performed by: INTERNAL MEDICINE

## 2022-03-23 PROCEDURE — 80053 COMPREHEN METABOLIC PANEL: CPT

## 2022-03-23 RX ORDER — TERAZOSIN 2 MG/1
2 CAPSULE ORAL NIGHTLY
Qty: 90 CAPSULE | Refills: 3 | Status: SHIPPED | OUTPATIENT
Start: 2022-03-23

## 2022-03-23 RX ORDER — ROSUVASTATIN CALCIUM 5 MG/1
5 TABLET, COATED ORAL NIGHTLY
Qty: 90 TABLET | Refills: 3 | Status: SHIPPED | OUTPATIENT
Start: 2022-03-23 | End: 2023-03-20

## 2022-03-23 RX ORDER — METOPROLOL SUCCINATE 50 MG/1
50 TABLET, EXTENDED RELEASE ORAL DAILY
Qty: 90 TABLET | Refills: 3 | Status: SHIPPED | OUTPATIENT
Start: 2022-03-23

## 2022-03-23 RX ORDER — SERTRALINE HYDROCHLORIDE 25 MG/1
1 TABLET, FILM COATED ORAL DAILY
COMMUNITY
Start: 2022-03-01

## 2022-03-25 ENCOUNTER — PATIENT MESSAGE (OUTPATIENT)
Dept: CARDIOLOGY | Facility: CLINIC | Age: 62
End: 2022-03-25

## 2022-09-15 ENCOUNTER — PATIENT MESSAGE (OUTPATIENT)
Dept: CARDIOLOGY | Facility: CLINIC | Age: 62
End: 2022-09-15

## 2022-09-15 DIAGNOSIS — R06.02 SHORTNESS OF BREATH: Primary | ICD-10-CM

## 2022-09-15 DIAGNOSIS — I25.42 SPONTANEOUS DISSECTION OF CORONARY ARTERY: ICD-10-CM

## 2022-09-15 DIAGNOSIS — R00.2 HEART PALPITATIONS: ICD-10-CM

## 2022-09-15 DIAGNOSIS — R07.2 PRECORDIAL CHEST PAIN: ICD-10-CM

## 2022-09-15 DIAGNOSIS — R53.83 FATIGUE, UNSPECIFIED TYPE: ICD-10-CM

## 2022-09-28 ENCOUNTER — LAB (OUTPATIENT)
Dept: LAB | Facility: HOSPITAL | Age: 62
End: 2022-09-28

## 2022-09-28 DIAGNOSIS — R06.02 SHORTNESS OF BREATH: ICD-10-CM

## 2022-09-28 DIAGNOSIS — I25.42 SPONTANEOUS DISSECTION OF CORONARY ARTERY: ICD-10-CM

## 2022-09-28 DIAGNOSIS — R53.83 FATIGUE, UNSPECIFIED TYPE: ICD-10-CM

## 2022-09-28 DIAGNOSIS — R00.2 HEART PALPITATIONS: ICD-10-CM

## 2022-09-28 DIAGNOSIS — R07.2 PRECORDIAL CHEST PAIN: ICD-10-CM

## 2022-09-28 LAB
ALBUMIN SERPL-MCNC: 4.4 G/DL (ref 3.5–5.2)
ALBUMIN/GLOB SERPL: 2 G/DL
ALP SERPL-CCNC: 59 U/L (ref 39–117)
ALT SERPL W P-5'-P-CCNC: 27 U/L (ref 1–33)
ANION GAP SERPL CALCULATED.3IONS-SCNC: 11.2 MMOL/L (ref 5–15)
AST SERPL-CCNC: 29 U/L (ref 1–32)
BILIRUB SERPL-MCNC: 0.4 MG/DL (ref 0–1.2)
BUN SERPL-MCNC: 11 MG/DL (ref 8–23)
BUN/CREAT SERPL: 13.8 (ref 7–25)
CALCIUM SPEC-SCNC: 9.1 MG/DL (ref 8.6–10.5)
CHLORIDE SERPL-SCNC: 104 MMOL/L (ref 98–107)
CO2 SERPL-SCNC: 27.8 MMOL/L (ref 22–29)
CREAT SERPL-MCNC: 0.8 MG/DL (ref 0.57–1)
DEPRECATED RDW RBC AUTO: 39.8 FL (ref 37–54)
EGFRCR SERPLBLD CKD-EPI 2021: 84.5 ML/MIN/1.73
ERYTHROCYTE [DISTWIDTH] IN BLOOD BY AUTOMATED COUNT: 11.8 % (ref 12.3–15.4)
GLOBULIN UR ELPH-MCNC: 2.2 GM/DL
GLUCOSE SERPL-MCNC: 111 MG/DL (ref 65–99)
HCT VFR BLD AUTO: 41.2 % (ref 34–46.6)
HGB BLD-MCNC: 13.9 G/DL (ref 12–15.9)
MCH RBC QN AUTO: 30.9 PG (ref 26.6–33)
MCHC RBC AUTO-ENTMCNC: 33.7 G/DL (ref 31.5–35.7)
MCV RBC AUTO: 91.6 FL (ref 79–97)
NT-PROBNP SERPL-MCNC: 159 PG/ML (ref 0–900)
PLATELET # BLD AUTO: 224 10*3/MM3 (ref 140–450)
PMV BLD AUTO: 10.3 FL (ref 6–12)
POTASSIUM SERPL-SCNC: 4.4 MMOL/L (ref 3.5–5.2)
PROT SERPL-MCNC: 6.6 G/DL (ref 6–8.5)
RBC # BLD AUTO: 4.5 10*6/MM3 (ref 3.77–5.28)
SODIUM SERPL-SCNC: 143 MMOL/L (ref 136–145)
WBC NRBC COR # BLD: 4.97 10*3/MM3 (ref 3.4–10.8)

## 2022-09-28 PROCEDURE — 83880 ASSAY OF NATRIURETIC PEPTIDE: CPT

## 2022-09-28 PROCEDURE — 36415 COLL VENOUS BLD VENIPUNCTURE: CPT

## 2022-09-28 PROCEDURE — 80053 COMPREHEN METABOLIC PANEL: CPT

## 2022-09-28 PROCEDURE — 85027 COMPLETE CBC AUTOMATED: CPT

## 2022-10-12 ENCOUNTER — HOSPITAL ENCOUNTER (OUTPATIENT)
Dept: CARDIOLOGY | Facility: HOSPITAL | Age: 62
Discharge: HOME | End: 2022-10-12
Admitting: INTERNAL MEDICINE

## 2022-10-12 VITALS
BODY MASS INDEX: 26.37 KG/M2 | HEIGHT: 68 IN | WEIGHT: 174 LBS | SYSTOLIC BLOOD PRESSURE: 135 MMHG | DIASTOLIC BLOOD PRESSURE: 66 MMHG

## 2022-10-12 DIAGNOSIS — R06.02 SHORTNESS OF BREATH: ICD-10-CM

## 2022-10-12 DIAGNOSIS — I25.42 SPONTANEOUS DISSECTION OF CORONARY ARTERY: ICD-10-CM

## 2022-10-12 DIAGNOSIS — R07.2 PRECORDIAL CHEST PAIN: ICD-10-CM

## 2022-10-12 DIAGNOSIS — R00.2 HEART PALPITATIONS: ICD-10-CM

## 2022-10-12 DIAGNOSIS — R53.83 FATIGUE, UNSPECIFIED TYPE: ICD-10-CM

## 2022-10-12 LAB
BH CV ECHO MEAS - AO MAX PG: 5.2 MMHG
BH CV ECHO MEAS - AO MEAN PG: 3.2 MMHG
BH CV ECHO MEAS - AO ROOT DIAM: 3.6 CM
BH CV ECHO MEAS - AO V2 MAX: 114.4 CM/SEC
BH CV ECHO MEAS - AO V2 VTI: 31.9 CM
BH CV ECHO MEAS - AVA(I,D): 2.6 CM2
BH CV ECHO MEAS - EDV(CUBED): 125 ML
BH CV ECHO MEAS - EDV(MOD-SP2): 87.8 ML
BH CV ECHO MEAS - EDV(MOD-SP4): 69.6 ML
BH CV ECHO MEAS - EF(MOD-BP): 63.8 %
BH CV ECHO MEAS - EF(MOD-SP2): 67 %
BH CV ECHO MEAS - EF(MOD-SP4): 58.8 %
BH CV ECHO MEAS - ESV(CUBED): 40.3 ML
BH CV ECHO MEAS - ESV(MOD-SP2): 29 ML
BH CV ECHO MEAS - ESV(MOD-SP4): 28.7 ML
BH CV ECHO MEAS - FS: 31.4 %
BH CV ECHO MEAS - IVS/LVPW: 1.03 CM
BH CV ECHO MEAS - IVSD: 0.9 CM
BH CV ECHO MEAS - LA DIMENSION: 3.7 CM
BH CV ECHO MEAS - LAT PEAK E' VEL: 7.4 CM/SEC
BH CV ECHO MEAS - LV MASS(C)D: 208.6 GRAMS
BH CV ECHO MEAS - LV MAX PG: 3.6 MMHG
BH CV ECHO MEAS - LV MEAN PG: 1.85 MMHG
BH CV ECHO MEAS - LV V1 MAX: 94.7 CM/SEC
BH CV ECHO MEAS - LV V1 VTI: 24.9 CM
BH CV ECHO MEAS - LVIDD: 5 CM
BH CV ECHO MEAS - LVIDS: 3.4 CM
BH CV ECHO MEAS - LVOT AREA: 3.4 CM2
BH CV ECHO MEAS - LVOT DIAM: 2.07 CM
BH CV ECHO MEAS - LVPWD: 0.9 CM
BH CV ECHO MEAS - MED PEAK E' VEL: 5.4 CM/SEC
BH CV ECHO MEAS - MR MAX PG: 26.6 MMHG
BH CV ECHO MEAS - MR MAX VEL: 257.9 CM/SEC
BH CV ECHO MEAS - MV A MAX VEL: 68.1 CM/SEC
BH CV ECHO MEAS - MV DEC SLOPE: 223.9 CM/SEC2
BH CV ECHO MEAS - MV DEC TIME: 0.46 MSEC
BH CV ECHO MEAS - MV E MAX VEL: 49.7 CM/SEC
BH CV ECHO MEAS - MV E/A: 0.73
BH CV ECHO MEAS - MV P1/2T: 84.7 MSEC
BH CV ECHO MEAS - MVA(P1/2T): 2.6 CM2
BH CV ECHO MEAS - PA ACC TIME: 0.16 SEC
BH CV ECHO MEAS - PA PR(ACCEL): 6.9 MMHG
BH CV ECHO MEAS - PA V2 MAX: 69.4 CM/SEC
BH CV ECHO MEAS - PI END-D VEL: 35.7 CM/SEC
BH CV ECHO MEAS - RAP SYSTOLE: 3 MMHG
BH CV ECHO MEAS - RVSP: 7 MMHG
BH CV ECHO MEAS - SV(LVOT): 83.7 ML
BH CV ECHO MEAS - SV(MOD-SP2): 58.8 ML
BH CV ECHO MEAS - SV(MOD-SP4): 40.9 ML
BH CV ECHO MEAS - TAPSE (>1.6): 1.78 CM
BH CV ECHO MEAS - TR MAX PG: 3.6 MMHG
BH CV ECHO MEAS - TR MAX VEL: 94.1 CM/SEC
BH CV ECHO MEASUREMENTS AVERAGE E/E' RATIO: 7.77
BH CV XLRA - RV BASE: 3.2 CM
BH CV XLRA - RV LENGTH: 7.2 CM
BH CV XLRA - RV MID: 2.19 CM
BH CV XLRA - TDI S': 9.4 CM/SEC
LEFT ATRIUM VOLUME INDEX: 27.8 ML/M2
LV EF 2D ECHO EST: 55 %
MAXIMAL PREDICTED HEART RATE: 160 BPM
STRESS TARGET HR: 136 BPM

## 2022-10-12 PROCEDURE — 93306 TTE W/DOPPLER COMPLETE: CPT | Performed by: INTERNAL MEDICINE

## 2022-10-12 PROCEDURE — 93306 TTE W/DOPPLER COMPLETE: CPT

## 2023-03-20 RX ORDER — ROSUVASTATIN CALCIUM 5 MG/1
TABLET, COATED ORAL
Qty: 90 TABLET | Refills: 3 | Status: SHIPPED | OUTPATIENT
Start: 2023-03-20

## 2023-05-14 ENCOUNTER — APPOINTMENT (OUTPATIENT)
Dept: GENERAL RADIOLOGY | Facility: HOSPITAL | Age: 63
End: 2023-05-14
Payer: COMMERCIAL

## 2023-05-14 ENCOUNTER — HOSPITAL ENCOUNTER (EMERGENCY)
Facility: HOSPITAL | Age: 63
Discharge: HOME | End: 2023-05-14
Attending: EMERGENCY MEDICINE | Admitting: EMERGENCY MEDICINE
Payer: COMMERCIAL

## 2023-05-14 VITALS
OXYGEN SATURATION: 97 % | HEART RATE: 53 BPM | HEIGHT: 69 IN | RESPIRATION RATE: 18 BRPM | WEIGHT: 175 LBS | BODY MASS INDEX: 25.92 KG/M2 | SYSTOLIC BLOOD PRESSURE: 131 MMHG | DIASTOLIC BLOOD PRESSURE: 78 MMHG | TEMPERATURE: 97.6 F

## 2023-05-14 DIAGNOSIS — I48.0 PAROXYSMAL A-FIB: Primary | ICD-10-CM

## 2023-05-14 LAB
ALBUMIN SERPL-MCNC: 4.1 G/DL (ref 3.5–5.2)
ALBUMIN/GLOB SERPL: 1.7 G/DL
ALP SERPL-CCNC: 79 U/L (ref 39–117)
ALT SERPL W P-5'-P-CCNC: 21 U/L (ref 1–33)
ANION GAP SERPL CALCULATED.3IONS-SCNC: 10 MMOL/L (ref 5–15)
APTT PPP: 27.7 SECONDS (ref 60–90)
AST SERPL-CCNC: 26 U/L (ref 1–32)
BASOPHILS # BLD AUTO: 0.04 10*3/MM3 (ref 0–0.2)
BASOPHILS NFR BLD AUTO: 0.6 % (ref 0–1.5)
BILIRUB SERPL-MCNC: 0.2 MG/DL (ref 0–1.2)
BUN SERPL-MCNC: 18 MG/DL (ref 8–23)
BUN/CREAT SERPL: 25.7 (ref 7–25)
CALCIUM SPEC-SCNC: 9.1 MG/DL (ref 8.6–10.5)
CHLORIDE SERPL-SCNC: 108 MMOL/L (ref 98–107)
CO2 SERPL-SCNC: 25 MMOL/L (ref 22–29)
CREAT SERPL-MCNC: 0.7 MG/DL (ref 0.57–1)
DEPRECATED RDW RBC AUTO: 38 FL (ref 37–54)
EGFRCR SERPLBLD CKD-EPI 2021: 99.2 ML/MIN/1.73
EOSINOPHIL # BLD AUTO: 0.45 10*3/MM3 (ref 0–0.4)
EOSINOPHIL NFR BLD AUTO: 6.6 % (ref 0.3–6.2)
ERYTHROCYTE [DISTWIDTH] IN BLOOD BY AUTOMATED COUNT: 11.8 % (ref 12.3–15.4)
GLOBULIN UR ELPH-MCNC: 2.4 GM/DL
GLUCOSE SERPL-MCNC: 128 MG/DL (ref 65–99)
HCT VFR BLD AUTO: 38.6 % (ref 34–46.6)
HGB BLD-MCNC: 13.2 G/DL (ref 12–15.9)
HOLD SPECIMEN: NORMAL
IMM GRANULOCYTES # BLD AUTO: 0.01 10*3/MM3 (ref 0–0.05)
IMM GRANULOCYTES NFR BLD AUTO: 0.1 % (ref 0–0.5)
INR PPP: 0.97 (ref 0.89–1.12)
LYMPHOCYTES # BLD AUTO: 2.18 10*3/MM3 (ref 0.7–3.1)
LYMPHOCYTES NFR BLD AUTO: 31.7 % (ref 19.6–45.3)
MAGNESIUM SERPL-MCNC: 2.1 MG/DL (ref 1.6–2.4)
MCH RBC QN AUTO: 30.1 PG (ref 26.6–33)
MCHC RBC AUTO-ENTMCNC: 34.2 G/DL (ref 31.5–35.7)
MCV RBC AUTO: 87.9 FL (ref 79–97)
MONOCYTES # BLD AUTO: 0.78 10*3/MM3 (ref 0.1–0.9)
MONOCYTES NFR BLD AUTO: 11.4 % (ref 5–12)
NEUTROPHILS NFR BLD AUTO: 3.41 10*3/MM3 (ref 1.7–7)
NEUTROPHILS NFR BLD AUTO: 49.6 % (ref 42.7–76)
NRBC BLD AUTO-RTO: 0 /100 WBC (ref 0–0.2)
NT-PROBNP SERPL-MCNC: 218.5 PG/ML (ref 0–900)
PLATELET # BLD AUTO: 231 10*3/MM3 (ref 140–450)
PMV BLD AUTO: 10 FL (ref 6–12)
POTASSIUM SERPL-SCNC: 4.1 MMOL/L (ref 3.5–5.2)
PROT SERPL-MCNC: 6.5 G/DL (ref 6–8.5)
PROTHROMBIN TIME: 13 SECONDS (ref 12.2–14.5)
QT INTERVAL: 298 MS
QT INTERVAL: 408 MS
QTC INTERVAL: 443 MS
QTC INTERVAL: 467 MS
RBC # BLD AUTO: 4.39 10*6/MM3 (ref 3.77–5.28)
SODIUM SERPL-SCNC: 143 MMOL/L (ref 136–145)
TROPONIN T SERPL HS-MCNC: 10 NG/L
TSH SERPL DL<=0.05 MIU/L-ACNC: 4.11 UIU/ML (ref 0.27–4.2)
WBC NRBC COR # BLD: 6.87 10*3/MM3 (ref 3.4–10.8)
WHOLE BLOOD HOLD COAG: NORMAL
WHOLE BLOOD HOLD SPECIMEN: NORMAL

## 2023-05-14 PROCEDURE — 99283 EMERGENCY DEPT VISIT LOW MDM: CPT

## 2023-05-14 PROCEDURE — 93005 ELECTROCARDIOGRAM TRACING: CPT | Performed by: EMERGENCY MEDICINE

## 2023-05-14 PROCEDURE — 85610 PROTHROMBIN TIME: CPT | Performed by: EMERGENCY MEDICINE

## 2023-05-14 PROCEDURE — 25010000002 MAGNESIUM SULFATE IN D5W 1G/100ML (PREMIX) 1-5 GM/100ML-% SOLUTION: Performed by: EMERGENCY MEDICINE

## 2023-05-14 PROCEDURE — 80053 COMPREHEN METABOLIC PANEL: CPT | Performed by: EMERGENCY MEDICINE

## 2023-05-14 PROCEDURE — 71045 X-RAY EXAM CHEST 1 VIEW: CPT

## 2023-05-14 PROCEDURE — 83735 ASSAY OF MAGNESIUM: CPT | Performed by: EMERGENCY MEDICINE

## 2023-05-14 PROCEDURE — 84443 ASSAY THYROID STIM HORMONE: CPT | Performed by: EMERGENCY MEDICINE

## 2023-05-14 PROCEDURE — 84484 ASSAY OF TROPONIN QUANT: CPT | Performed by: EMERGENCY MEDICINE

## 2023-05-14 PROCEDURE — 83880 ASSAY OF NATRIURETIC PEPTIDE: CPT | Performed by: EMERGENCY MEDICINE

## 2023-05-14 PROCEDURE — 85025 COMPLETE CBC W/AUTO DIFF WBC: CPT | Performed by: EMERGENCY MEDICINE

## 2023-05-14 PROCEDURE — 96365 THER/PROPH/DIAG IV INF INIT: CPT

## 2023-05-14 PROCEDURE — 85730 THROMBOPLASTIN TIME PARTIAL: CPT | Performed by: EMERGENCY MEDICINE

## 2023-05-14 RX ORDER — MAGNESIUM SULFATE 1 G/100ML
1 INJECTION INTRAVENOUS ONCE
Status: COMPLETED | OUTPATIENT
Start: 2023-05-14 | End: 2023-05-14

## 2023-05-14 RX ORDER — METOPROLOL TARTRATE 5 MG/5ML
5 INJECTION INTRAVENOUS ONCE
Status: DISCONTINUED | OUTPATIENT
Start: 2023-05-14 | End: 2023-05-14

## 2023-05-14 RX ORDER — ASPIRIN 81 MG/1
324 TABLET, CHEWABLE ORAL ONCE
Status: COMPLETED | OUTPATIENT
Start: 2023-05-14 | End: 2023-05-14

## 2023-05-14 RX ORDER — SODIUM CHLORIDE 0.9 % (FLUSH) 0.9 %
10 SYRINGE (ML) INJECTION AS NEEDED
Status: DISCONTINUED | OUTPATIENT
Start: 2023-05-14 | End: 2023-05-14 | Stop reason: HOSPADM

## 2023-05-14 RX ORDER — ASPIRIN 325 MG
325 TABLET ORAL ONCE
Status: DISCONTINUED | OUTPATIENT
Start: 2023-05-14 | End: 2023-05-14

## 2023-05-14 RX ADMIN — SODIUM CHLORIDE, POTASSIUM CHLORIDE, SODIUM LACTATE AND CALCIUM CHLORIDE 1000 ML: 600; 310; 30; 20 INJECTION, SOLUTION INTRAVENOUS at 03:26

## 2023-05-14 RX ADMIN — MAGNESIUM SULFATE IN DEXTROSE 1 G: 10 INJECTION, SOLUTION INTRAVENOUS at 03:45

## 2023-05-14 RX ADMIN — ASPIRIN 81 MG 324 MG: 81 TABLET ORAL at 03:29

## 2023-05-14 NOTE — ED PROVIDER NOTES
" EMERGENCY DEPARTMENT ENCOUNTER    Pt Name: Carolyn Chaparro  MRN: [id number]  Pt :   1960  Room Number:    Date of encounter:  2023  PCP: Erika Hernandez PA  ED Provider: Catracho Verdin MD    Historian: Patient      HPI:  Chief Complaint: Fatigue        Context: Carolyn Chaparro is a 62 y.o. female who presents to the ED c/o palpitations, awakening from sleep about 1 hour prior to arrival.  Past history of PVCs, with a prior history of heart disease specifically coronary artery dissection, in the past.  No history of valve disease.  She did have COVID, last month, has recovered 2 weeks ago without any ongoing symptoms.  Does not have pleuritic chest pain or shortness of breath.      PAST MEDICAL HISTORY  Past Medical History:   Diagnosis Date   • Anxiety    • Chronic constipation    • GERD (gastroesophageal reflux disease)    • Headache    • Heart palpitations    • Hypertension    • Osteopenia    • Post-menopause on HRT (hormone replacement therapy)     not currently   • PVCs (premature ventricular contractions)     hx   • Spontaneous dissection of coronary artery     \"attempted stent, but heart re-routed itself\"          PAST SURGICAL HISTORY  Past Surgical History:   Procedure Laterality Date   • CARDIAC CATHETERIZATION N/A 10/2/2018    Procedure: Left Heart Cath;  Surgeon: Hellen Allan MD;  Location:  [facility];  Service: Cardiology   • COLONOSCOPY     • LAPAROSCOPIC ASSISTED VAGINAL HYSTERECTOMY SALPINGO OOPHORECTOMY Bilateral    • LAPAROSCOPY WITH LASER     • PERINEOPLASTY     • POSTERIOR REPAIR     • SACROCOLPOPEXY N/A 2021    Procedure: SACROCOLPOPEXY LAPAROSCOPIC WITH ROBOTIC WITH MID URETHRAL SLING  CYSTOSCOPY POSTERIOR REPAIR;  Surgeon: Thelma Mortensen MD;  Location: [facility] OR;  Service: Robotics - DaVinci;  Laterality: N/A;         FAMILY HISTORY  Family History   Problem Relation Age of Onset   • Stroke Mother         " multiple strokes   • Heart disease Mother    • Heart failure Father         CHF         SOCIAL HISTORY  Social History     Socioeconomic History   • Marital status:    Tobacco Use   • Smoking status: Never   • Smokeless tobacco: Never   Vaping Use   • Vaping Use: Never used   Substance and Sexual Activity   • Alcohol use: No   • Drug use: No   • Sexual activity: Yes     Partners: Male     Birth control/protection: Surgical, Post-menopausal         ALLERGIES  Bisoprolol and Erythromycin        REVIEW OF SYSTEMS  Review of Systems       All systems reviewed and negative except for those discussed in HPI.       PHYSICAL EXAM    I have reviewed the triage vital signs and nursing notes.    ED Triage Vitals [05/14/23 0303]   Temp Heart Rate Resp BP SpO2   97.6 °F (36.4 °C) 100 18 115/91 100 %      Temp src Heart Rate Source Patient Position BP Location FiO2 (%)   Oral Monitor -- Left arm --       Physical Exam  GENERAL:   Appears in no acute distress.   HENT: Nares patent.  EYES: No scleral icterus.  CV: Regular tachycardia  RESPIRATORY: Normal effort.  No audible wheezes, rales or rhonchi.  ABDOMEN: Soft, nontender  MUSCULOSKELETAL: No deformities.   NEURO: Alert, moves all extremities, follows commands.  SKIN: Warm, dry, no rash visualized.      LAB RESULTS  Recent Results (from the past 24 hour(s))   ECG 12 Lead ED Triage Standing Order; Dysrhythmia    Collection Time: 05/14/23  3:12 AM   Result Value Ref Range    QT Interval 298 ms    QTC Interval 467 ms   Comprehensive Metabolic Panel    Collection Time: 05/14/23  3:22 AM    Specimen: Blood   Result Value Ref Range    Glucose 128 (H) 65 - 99 mg/dL    BUN 18 8 - 23 mg/dL    Creatinine 0.70 0.57 - 1.00 mg/dL    Sodium 143 136 - 145 mmol/L    Potassium 4.1 3.5 - 5.2 mmol/L    Chloride 108 (H) 98 - 107 mmol/L    CO2 25.0 22.0 - 29.0 mmol/L    Calcium 9.1 8.6 - 10.5 mg/dL    Total Protein 6.5 6.0 - 8.5 g/dL    Albumin 4.1 3.5 - 5.2 g/dL    ALT (SGPT) 21 1 - 33 U/L     AST (SGOT) 26 1 - 32 U/L    Alkaline Phosphatase 79 39 - 117 U/L    Total Bilirubin 0.2 0.0 - 1.2 mg/dL    Globulin 2.4 gm/dL    A/G Ratio 1.7 g/dL    BUN/Creatinine Ratio 25.7 (H) 7.0 - 25.0    Anion Gap 10.0 5.0 - 15.0 mmol/L    eGFR 99.2 >60.0 mL/min/1.73   Magnesium    Collection Time: 05/14/23  3:22 AM    Specimen: Blood   Result Value Ref Range    Magnesium 2.1 1.6 - 2.4 mg/dL   Single High Sensitivity Troponin T    Collection Time: 05/14/23  3:22 AM    Specimen: Blood   Result Value Ref Range    HS Troponin T 10 (H) <10 ng/L   TSH    Collection Time: 05/14/23  3:22 AM    Specimen: Blood   Result Value Ref Range    TSH 4.110 0.270 - 4.200 uIU/mL   BNP    Collection Time: 05/14/23  3:22 AM    Specimen: Blood   Result Value Ref Range    proBNP 218.5 0.0 - 900.0 pg/mL   Green Top (Gel)    Collection Time: 05/14/23  3:22 AM   Result Value Ref Range    Extra Tube Hold for add-ons.    Lavender Top    Collection Time: 05/14/23  3:22 AM   Result Value Ref Range    Extra Tube hold for add-on    Gold Top - SST    Collection Time: 05/14/23  3:22 AM   Result Value Ref Range    Extra Tube Hold for add-ons.    Light Blue Top    Collection Time: 05/14/23  3:22 AM   Result Value Ref Range    Extra Tube Hold for add-ons.    CBC Auto Differential    Collection Time: 05/14/23  3:22 AM    Specimen: Blood   Result Value Ref Range    WBC 6.87 3.40 - 10.80 10*3/mm3    RBC 4.39 3.77 - 5.28 10*6/mm3    Hemoglobin 13.2 12.0 - 15.9 g/dL    Hematocrit 38.6 34.0 - 46.6 %    MCV 87.9 79.0 - 97.0 fL    MCH 30.1 26.6 - 33.0 pg    MCHC 34.2 31.5 - 35.7 g/dL    RDW 11.8 (L) 12.3 - 15.4 %    RDW-SD 38.0 37.0 - 54.0 fl    MPV 10.0 6.0 - 12.0 fL    Platelets 231 140 - 450 10*3/mm3    Neutrophil % 49.6 42.7 - 76.0 %    Lymphocyte % 31.7 19.6 - 45.3 %    Monocyte % 11.4 5.0 - 12.0 %    Eosinophil % 6.6 (H) 0.3 - 6.2 %    Basophil % 0.6 0.0 - 1.5 %    Immature Grans % 0.1 0.0 - 0.5 %    Neutrophils, Absolute 3.41 1.70 - 7.00 10*3/mm3     Lymphocytes, Absolute 2.18 0.70 - 3.10 10*3/mm3    Monocytes, Absolute 0.78 0.10 - 0.90 10*3/mm3    Eosinophils, Absolute 0.45 (H) 0.00 - 0.40 10*3/mm3    Basophils, Absolute 0.04 0.00 - 0.20 10*3/mm3    Immature Grans, Absolute 0.01 0.00 - 0.05 10*3/mm3    nRBC 0.0 0.0 - 0.2 /100 WBC   Protime-INR    Collection Time: 05/14/23  3:22 AM    Specimen: Blood   Result Value Ref Range    Protime 13.0 12.2 - 14.5 Seconds    INR 0.97 0.89 - 1.12   aPTT    Collection Time: 05/14/23  3:22 AM    Specimen: Blood   Result Value Ref Range    PTT 27.7 (L) 60.0 - 90.0 seconds   ECG 12 Lead Rhythm Change    Collection Time: 05/14/23  3:32 AM   Result Value Ref Range    QT Interval 408 ms    QTC Interval 443 ms       If labs were ordered, I independently reviewed the results and considered them in treating the patient.        RADIOLOGY  XR Chest 1 View    Result Date: 5/14/2023  FRONTAL VIEW OF THE CHEST CLINICAL INDICATION: Dysrhythmia. COMPARISON: 10/1/2018. FINDINGS: No focal consolidation, pleural effusion or pneumothorax. Cardiomediastinal morphology is normal.     No acute cardiopulmonary abnormality. Electronically signed by:  Gabriel Vazquez M.D.  5/14/2023 2:55 AM Mountain Time        PROCEDURES    Procedures    ECG 12 Lead Rhythm Change   Final Result   Test Reason : Rhythm Change   Blood Pressure :   */*   mmHG   Vent. Rate :  71 BPM     Atrial Rate :  71 BPM      P-R Int : 164 ms          QRS Dur :  96 ms       QT Int : 408 ms       P-R-T Axes :  32  -5  61 degrees      QTc Int : 443 ms      Normal sinus rhythm   Normal ECG   When compared with ECG of 14-MAY-2023 03:12, (Unconfirmed)   Sinus rhythm has replaced Atrial fibrillation   Vent. rate has decreased BY  77 BPM   ST less depressed in Inferior leads   ST no longer depressed in Anterior leads   Nonspecific T wave abnormality has replaced inverted T waves in Lateral    leads   Confirmed by ARTURO BONILLA ([id number]) on 5/14/2023 4:26:19 AM      Referred By: CARSON            Confirmed By: ARTURO BONILLA      ECG 12 Lead ED Triage Standing Order; Dysrhythmia   Final Result   Test Reason : ED Triage Standing Order~   Blood Pressure :   */*   mmHG   Vent. Rate : 148 BPM     Atrial Rate : 163 BPM      P-R Int :   * ms          QRS Dur :  84 ms       QT Int : 298 ms       P-R-T Axes :   *   1 102 degrees      QTc Int : 467 ms      Atrial fibrillation with rapid ventricular response   Nonspecific ST and T wave abnormality   Abnormal ECG   When compared with ECG of 11-MAY-2021 09:30,   Atrial fibrillation has replaced Sinus rhythm   Vent. rate has increased BY  95 BPM   ST now depressed in Anterior leads   T wave inversion now evident in Lateral leads   Confirmed by ARTURO BONILLA ([id number]) on 5/14/2023 4:26:22 AM      Referred By: EDMD           Confirmed By: ARTURO BONILLA          MEDICATIONS GIVEN IN ER    Medications   sodium chloride 0.9 % flush 10 mL (has no administration in time range)   magnesium sulfate in D5W 1g/100mL (PREMIX) (0 g Intravenous Stopped 5/14/23 0524)   lactated ringers bolus 1,000 mL (0 mL Intravenous Stopped 5/14/23 0430)   aspirin chewable tablet 324 mg (324 mg Oral Given 5/14/23 0329)         MEDICAL DECISION MAKING, PROGRESS, and CONSULTS    All labs have been independently reviewed by me.  All radiology studies have been reviewed by me and the radiologist dictating the report.  All EKG's have been independently viewed and interpreted by me.      Discussion below represents my analysis of pertinent findings related to patient's condition, differential diagnosis, treatment plan and final disposition.      Differential diagnosis:    A-fib, arrhythmia, electrolyte disturbance.        Orders placed during this visit:  Orders Placed This Encounter   Procedures   • XR Chest 1 View   • Sharon Draw   • Comprehensive Metabolic Panel   • Magnesium   • Single High Sensitivity Troponin T   • TSH   • BNP   • CBC Auto Differential   • Basic Metabolic Panel   • Protime-INR  "  • aPTT   • Ambulatory Referral to [facility]   • NPO Diet NPO Type: Strict NPO   • Undress & Gown   • Cardiac Monitoring   • Continuous Pulse Oximetry   • Oxygen Therapy- Nasal Cannula; 2 LPM; Titrate for SPO2: 92%   • ECG 12 Lead ED Triage Standing Order; Dysrhythmia   • ECG 12 Lead Rhythm Change   • Insert Peripheral IV   • CBC & Differential   • Green Top (Gel)   • Lavender Top   • Gold Top - SST   • Gray Top   • Light Blue Top             ED Course:        ED Course as of 05/14/23 0705   Sun May 14, 2023   0422 After placement of an IV, on the cardiac monitor, Ms. Chaparro did spontaneously convert to sinus rhythm. [TA]   0422 The patient on telemetry over a couple of hours, shows persisting sinus rhythm. [TA]   0424 [id number] 3 points  Stroke risk was 3.2% per year in >90,000 patients (the [place] Atrial Fibrillation Cohort Study) and 4.6% risk of stroke/TIA/systemic embolism.    One recommendation suggests a 0 score for men or 1 score for women (no clinical risk factors) is \"low\" risk and may not require anticoagulation; a 1 score for men or 2 score for women is \"low-moderate\" risk and should consider antiplatelet or anticoagulation; and a score =2 for men or =3 for women is \"moderate-high\" risk and should otherwise be an anticoagulation candidate. [TA]   0424 Ms. Chaparro does take metoprolol, as well as aspirin.  Did discuss utility of further anticoagulation, will refer to cardio clinic for follow-up considering history of coronary artery dissection. [TA]   0521 She remains in sinus rhythm, after 2 hours plus observation. [TA]      ED Course User Index  [TA] Catracho Verdin MD                  AS OF 07:05 EDT VITALS:    BP - 131/78  HR - 53  TEMP - 97.6 °F (36.4 °C) (Oral)  O2 SATS - 97%                  DIAGNOSIS  Final diagnoses:   Paroxysmal A-fib         DISPOSITION  DISCHARGE    Patient discharged in stable condition.    Reviewed implications of results, diagnosis, meds, " responsibility to follow up, warning signs and symptoms of possible worsening, potential complications and reasons to return to ER.    Patient/Family voiced understanding of above instructions.    Discussed plan for discharge, as there is no emergent indication for admission.  Pt/family is agreeable and understands need for follow up and possible repeat testing.  Pt/family is aware that discharge does not mean that nothing is wrong but that it indicates no emergency is currently present that requires admission and they must continue care with follow-up as given below or with a physician of their choice.     FOLLOW-UP  Hellen Allan MD  [address] [facility]  [address]  [place] [address]  [phone]          [facility] CARDIOLOGY  [address] [facility] [address]  [address]  [facility] [id number] [phone]             Medication List      Changed    metoprolol succinate XL 50 MG 24 hr tablet  Commonly known as: TOPROL-XL  Take 1 tablet by mouth Daily.  What changed: how much to take                Please note that portions of this document were completed with voice recognition software.      Catracho Verdin MD  05/14/23 0426       Catracho Verdin MD  05/14/23 0745

## 2023-05-15 ENCOUNTER — TELEPHONE (OUTPATIENT)
Dept: CARDIOLOGY | Facility: CLINIC | Age: 63
End: 2023-05-15
Payer: COMMERCIAL

## 2023-05-15 NOTE — TELEPHONE ENCOUNTER
Patient called to let us know she was in the ER over the weekend for paroxysmal atrial fibrillation.  Patient converted on her own but she states she has never had atrial fibrillation before.   Currently in sinus rhythm with heart rates in the 50's-60's. Blood pressure running 130's/80's.  She is currently only on Toprol 25mg.    Patient is going on a cruise in a couple of days and was wondering your recommendations.     ED visit in [facility].     Please advice. Thank you!

## 2023-05-16 RX ORDER — METOPROLOL SUCCINATE 50 MG/1
50 TABLET, EXTENDED RELEASE ORAL DAILY
Qty: 90 TABLET | Refills: 0 | Status: SHIPPED | OUTPATIENT
Start: 2023-05-16

## 2023-05-16 NOTE — TELEPHONE ENCOUNTER
Relayed recommendations to patient.  Patient to increase metoprolol to 50mg daily.     Will place Xarelto. Patient to start taking Xarelto for 7 days if she goes back into afib for more than 5 hours.   Patient verbalized understanding.   She is to call back if she has any questions or concerns.

## 2023-05-16 NOTE — TELEPHONE ENCOUNTER
Hellen Allan MD  to Me        3:22 PM  I would increase metoprolol to 50 mg a day.  She should probably have 7 days of Xarelto 20 mg to take should it recur and last for more than 5 hours.  If she has a recurrence on the higher dose of metoprolol we could consider an antiarrhythmic.

## 2023-05-22 RX ORDER — RIVAROXABAN 20 MG/1
TABLET, FILM COATED ORAL
Qty: 7 TABLET | Refills: 0 | OUTPATIENT
Start: 2023-05-22

## 2023-06-19 RX ORDER — TERAZOSIN 2 MG/1
CAPSULE ORAL
Qty: 90 CAPSULE | Refills: 3 | Status: SHIPPED | OUTPATIENT
Start: 2023-06-19

## 2023-08-08 ENCOUNTER — TELEPHONE (OUTPATIENT)
Dept: CARDIOLOGY | Facility: CLINIC | Age: 63
End: 2023-08-08
Payer: COMMERCIAL

## 2023-08-08 DIAGNOSIS — I48.0 PAF (PAROXYSMAL ATRIAL FIBRILLATION): Primary | ICD-10-CM

## 2023-08-08 RX ORDER — METOPROLOL SUCCINATE 50 MG/1
50 TABLET, EXTENDED RELEASE ORAL 2 TIMES DAILY
Qty: 180 TABLET | Refills: 3 | Status: SHIPPED | OUTPATIENT
Start: 2023-08-08

## 2023-08-08 NOTE — TELEPHONE ENCOUNTER
Per [facility]-patient to increase metoprolol to 50mg BID and/or 30day MCOT for recurrent afib and/or a cardiolite stress test.  Cardiac rehab is ok but patient does not have a diagnosis to support this.  She would have to pay out of pocket.  Spoke with patient.  She would like to try increase in medication and wear a MCOT.  I will send in orders and patient is to call with any questions or concerns.

## 2023-08-30 ENCOUNTER — OFFICE VISIT (OUTPATIENT)
Dept: SLEEP MEDICINE | Facility: HOSPITAL | Age: 63
End: 2023-08-30
Payer: COMMERCIAL

## 2023-08-30 VITALS
HEART RATE: 44 BPM | HEIGHT: 68 IN | SYSTOLIC BLOOD PRESSURE: 143 MMHG | WEIGHT: 181 LBS | BODY MASS INDEX: 27.43 KG/M2 | DIASTOLIC BLOOD PRESSURE: 77 MMHG | OXYGEN SATURATION: 97 %

## 2023-08-30 DIAGNOSIS — R06.83 SNORING: ICD-10-CM

## 2023-08-30 DIAGNOSIS — G47.19 EXCESSIVE DAYTIME SLEEPINESS: ICD-10-CM

## 2023-08-30 DIAGNOSIS — G47.33 OBSTRUCTIVE SLEEP APNEA, ADULT: Primary | ICD-10-CM

## 2023-08-30 NOTE — PROGRESS NOTES
Carolyn Chaparro is a 63 y.o. female.   Chief Complaint   Patient presents with    Sleeping Problem       HPI     63 y.o. female seen in consultation at the request of Hellen Allan,* for evaluation of the above.     She had 2 episodes of atrial fibrillation earlier this year.  She had COVID in April and had two 90-minute episodes in the months thereafter.  She has followed up with Dr. Allan in regards to this.    A history of longstanding snoring was elicited.  This is described by her .  She sleeps separately from him as he is very sensitive to any noise.    She does awaken with morning headaches.  She is sleepy afternoon with a lack of motivation.    She averages 1-3 awakenings per night.  She goes to sleep fairly quickly but then often wakes up earlier than her usual wake up time and can go back to sleep resulting in a diminished amount of sleep on some nights.    She has no RLS type symptoms, nocturnal hallucinations, or sleep paralysis.    Or[place] Scale is: 18/24    The patient's relevant past medical, surgical, family, and social history reviewed and updated in Epic as appropriate.    Current medications are:   Current Outpatient Medications:     aspirin 81 MG EC tablet, Take 1 tablet by mouth Daily., Disp: , Rfl:     CALCIUM PO, Take  by mouth., Disp: , Rfl:     cetirizine (zyrTEC) 10 MG tablet, Take 1 tablet by mouth Daily., Disp: , Rfl:     cholecalciferol (VITAMIN D3) 1000 units tablet, Take 1 tablet by mouth Daily., Disp: , Rfl:     metoprolol succinate XL (Toprol XL) 50 MG 24 hr tablet, Take 1 tablet by mouth 2 (Two) Times a Day., Disp: 180 tablet, Rfl: 3    Multiple Vitamins-Minerals (MULTIVITAMIN ADULT PO), Take 1 tablet by mouth Daily., Disp: , Rfl:     pantoprazole (PROTONIX) 40 MG EC tablet, Take 1 tablet by mouth Daily., Disp: , Rfl:     Probiotic Product (PROBIOTIC ADVANCED PO), Take 1 tablet by mouth Daily., Disp: , Rfl:     rosuvastatin (CRESTOR) 5 MG tablet,  "TAKE ONE TABLET BY MOUTH ONCE NIGHTLY, Disp: 90 tablet, Rfl: 3    terazosin (HYTRIN) 2 MG capsule, TAKE ONE CAPSULE BY MOUTH ONCE NIGHTLY, Disp: 90 capsule, Rfl: 3    vitamin B-12 (CYANOCOBALAMIN) 500 MCG tablet, Take 2 tablets by mouth Daily., Disp: , Rfl: .    Review of Systems    Review of Systems  ROS documented in patient questionnaire x14 systems.  Reviewed with patient.  Otherwise negative except as noted in HPI.    Physical Exam    Blood pressure 143/77, pulse (!) 44, height 172.7 cm (68\"), weight 82.1 kg (181 lb), SpO2 97 %. Body mass index is 27.52 kg/mý.    Physical Exam  Vitals and nursing note reviewed.   Constitutional:       Appearance: Normal appearance. She is well-developed.   HENT:      Head: Normocephalic and atraumatic.      Nose: Nose normal.      Mouth/Throat:      Mouth: Mucous membranes are moist.      Pharynx: Oropharynx is clear. No oropharyngeal exudate.      Comments: Class I airway  Eyes:      General: No scleral icterus.     Conjunctiva/sclera: Conjunctivae normal.   Neck:      Thyroid: No thyromegaly.      Trachea: No tracheal deviation.   Cardiovascular:      Rate and Rhythm: Normal rate and regular rhythm.      Heart sounds: No murmur heard.    No friction rub. No gallop.   Pulmonary:      Effort: Pulmonary effort is normal. No respiratory distress.      Breath sounds: No wheezing or rales.   Musculoskeletal:         General: No deformity. Normal range of motion.   Skin:     General: Skin is warm and dry.      Findings: No rash.   Neurological:      Mental Status: She is alert and oriented to person, place, and time.   Psychiatric:         Behavior: Behavior normal.         Thought Content: Thought content normal.     DATA:    Reviewed 6/21/2023 note from Dr. Allan    Reviewed overnight oximetry dated 7/13/2023 revealing oxygen saturations less than or equal to 88% for 6 minutes and 53 seconds throughout the night.    ASSESSMENT:    Problem List Items Addressed This Visit  "   None  Visit Diagnoses       Obstructive sleep apnea, adult    -  Primary    Relevant Orders    Home Sleep Study    Snoring        Relevant Orders    Home Sleep Study    Excessive daytime sleepiness        Relevant Orders    Home Sleep Study            63-year-old female with disturbed sleep and daytime somnolence.  She has been observed to snore.  She has morning headaches.  She has had several episodes of atrial fibrillation as described above.  As result, she is at high risk for the presence of undetected obstructive sleep apnea and warrants further evaluation.  Interestingly, she tells me that she had a sleep study 30 years ago at around the age of 32 and was told everything was okay.  She thinks she probably snored back then.    PLAN:    Home sleep apnea testing is an appropriate initial diagnostic step in her case.  I discussed the diagnostic process as well as treatment options for obstructive sleep apnea if that is diagnosed.  I went over the long-term cardiovascular and metabolic risks of untreated obstructive sleep apnea.  The patient was amenable to a trial of CPAP therapy if deemed appropriate after testing complete.  Close sleep center follow-up.    I have reviewed the results of my evaluation and impression and discussed my recommendations in detail with the patient.    Signed by  Marcel Ortiz MD    August 30, 2023      CC: Erika Hernandez PA Hollingsworth, Paula W,*

## 2023-09-18 ENCOUNTER — HOSPITAL ENCOUNTER (OUTPATIENT)
Dept: SLEEP MEDICINE | Facility: HOSPITAL | Age: 63
Discharge: HOME | End: 2023-09-18
Admitting: INTERNAL MEDICINE

## 2023-09-18 VITALS — WEIGHT: 181 LBS | HEIGHT: 68 IN | BODY MASS INDEX: 27.43 KG/M2

## 2023-09-18 DIAGNOSIS — R06.83 SNORING: ICD-10-CM

## 2023-09-18 DIAGNOSIS — G47.33 OBSTRUCTIVE SLEEP APNEA, ADULT: ICD-10-CM

## 2023-09-18 DIAGNOSIS — G47.19 EXCESSIVE DAYTIME SLEEPINESS: ICD-10-CM

## 2023-09-18 PROCEDURE — 95800 SLP STDY UNATTENDED: CPT

## 2023-09-20 DIAGNOSIS — G47.33 OSA (OBSTRUCTIVE SLEEP APNEA): Primary | ICD-10-CM

## 2023-09-20 NOTE — PROGRESS NOTES
ADVISED PATIENT OF STUDY RESULTS VIA [employer]. AWAITING RESPONSE ON DECISION FOR CJ TREATMENT.

## 2023-09-21 ENCOUNTER — OFFICE VISIT (OUTPATIENT)
Dept: SLEEP MEDICINE | Facility: HOSPITAL | Age: 63
End: 2023-09-21
Payer: COMMERCIAL

## 2023-09-21 VITALS
DIASTOLIC BLOOD PRESSURE: 69 MMHG | BODY MASS INDEX: 27.07 KG/M2 | SYSTOLIC BLOOD PRESSURE: 122 MMHG | HEART RATE: 52 BPM | WEIGHT: 178.6 LBS | OXYGEN SATURATION: 97 % | HEIGHT: 68 IN

## 2023-09-21 DIAGNOSIS — G47.33 OSA (OBSTRUCTIVE SLEEP APNEA): Primary | ICD-10-CM

## 2023-09-21 DIAGNOSIS — E66.3 OVERWEIGHT (BMI 25.0-29.9): ICD-10-CM

## 2023-09-21 PROCEDURE — 95800 SLP STDY UNATTENDED: CPT | Performed by: INTERNAL MEDICINE

## 2023-09-21 NOTE — PROGRESS NOTES
Sleep Clinic Follow Up Note    Chief Complaint  Follow-up    Subjective     History of Present Illness (from previous encounter on 8/30/2023 with Dr. Ortiz):  She had 2 episodes of atrial fibrillation earlier this year.  She had COVID in April and had two 90-minute episodes in the months thereafter.  She has followed up with Dr. Allan in regards to this.     A history of longstanding snoring was elicited.  This is described by her .  She sleeps separately from him as he is very sensitive to any noise.     She does awaken with morning headaches.  She is sleepy afternoon with a lack of motivation.     She averages 1-3 awakenings per night.  She goes to sleep fairly quickly but then often wakes up earlier than her usual wake up time and can go back to sleep resulting in a diminished amount of sleep on some nights.     She has no RLS type symptoms, nocturnal hallucinations, or sleep paralysis.     [place] Scale is: 18/24 (end copied text)     -A home sleep test was obtained on 9/19/2023 revealing severe obstructive sleep apnea with an AHI of 31.7/H.    Interval History:  Carolyn Chaparro is a 63 y.o. female who presents for follow-up after home sleep test.  The patient was found with severe obstructive sleep apnea with an AHI of 31.7/H.     Further details are as follows:    Kirvin Scale is: 8/24    Weight: 178 lb      The patient's relevant past medical, surgical, family, and social history reviewed and updated in Epic as appropriate.    PMH:    Past Medical History:   Diagnosis Date    Anxiety     Atrial fibrillation 05/14/2023    stopped after 90 min. went to ER    Chronic constipation     GERD (gastroesophageal reflux disease)     Headache     Heart palpitations     Hypertension     Myocardial infarction 10/01/18    Spontaneous dissection    CJ (obstructive sleep apnea) 9/18/2023    Osteopenia     Post-menopause on HRT (hormone replacement therapy)     not currently    PVCs (premature  "ventricular contractions)     hx    Spontaneous dissection of coronary artery     \"attempted stent, but heart re-routed itself\"      Past Surgical History:   Procedure Laterality Date    CARDIAC CATHETERIZATION N/A 10/02/2018    Procedure: Left Heart Cath;  Surgeon: Hellen Allan MD;  Location:  [facility];  Service: Cardiology    COLONOSCOPY      LAPAROSCOPIC ASSISTED VAGINAL HYSTERECTOMY SALPINGO OOPHORECTOMY Bilateral     LAPAROSCOPY WITH LASER      PERINEOPLASTY      POSTERIOR REPAIR      SACROCOLPOPEXY N/A 2021    Procedure: SACROCOLPOPEXY LAPAROSCOPIC WITH ROBOTIC WITH MID URETHRAL SLING  CYSTOSCOPY POSTERIOR REPAIR;  Surgeon: Thelma Mortensen MD;  Location:  [facility] OR;  Service: Robotics - DaVinci;  Laterality: N/A;    WISDOM TOOTH EXTRACTION       OB History          3    Para   2    Term                AB        Living   2         SAB        IAB        Ectopic        Molar        Multiple        Live Births                  Allergies   Allergen Reactions    Bisoprolol Other (See Comments)     Ineffective in controlling palps    Erythromycin Diarrhea       MEDS:  Prior to Admission medications    Medication Sig Start Date End Date Taking? Authorizing Provider   aspirin 81 MG EC tablet Take 1 tablet by mouth Daily.    ProviderVernon MD   CALCIUM PO Take  by mouth.    ProviderVernon MD   cetirizine (zyrTEC) 10 MG tablet Take 1 tablet by mouth Daily.    Emergency, Nurse Epic, RN   cholecalciferol (VITAMIN D3) 1000 units tablet Take 1 tablet by mouth Daily.    ProviderVernon MD   metoprolol succinate XL (Toprol XL) 50 MG 24 hr tablet Take 1 tablet by mouth 2 (Two) Times a Day. 23   Hellen Allan MD   Multiple Vitamins-Minerals (MULTIVITAMIN ADULT PO) Take 1 tablet by mouth Daily.    ProviderVernon MD   pantoprazole (PROTONIX) 40 MG EC tablet Take 1 tablet by mouth Daily.    Vernon Padilla MD   Probiotic Product " "(PROBIOTIC ADVANCED PO) Take 1 tablet by mouth Daily.    Provider, MD Vernon   rosuvastatin (CRESTOR) 5 MG tablet TAKE ONE TABLET BY MOUTH ONCE NIGHTLY 3/20/23   Hellen Allan MD   terazosin (HYTRIN) 2 MG capsule TAKE ONE CAPSULE BY MOUTH ONCE NIGHTLY 6/19/23   Hellen Allan MD   vitamin B-12 (CYANOCOBALAMIN) 500 MCG tablet Take 2 tablets by mouth Daily.    Provider, MD Vernon       FH:  Family History   Problem Relation Age of Onset    Stroke Mother         multiple strokes    Heart disease Mother     Hypertension Mother     Heart failure Father         CHF    Thyroid disease Sister        Objective   Vital Signs:  /69 (BP Location: Left arm, Patient Position: Sitting)   Pulse 52   Ht 172.7 cm (68\")   Wt 81 kg (178 lb 9.6 oz)   SpO2 97%   BMI 27.16 kg/m²       Patient's (Body mass index is 27.16 kg/m².)   .        Physical Exam  Vitals reviewed.   Constitutional:       Appearance: Normal appearance.   HENT:      Head: Normocephalic and atraumatic.      Nose: Nose normal.      Mouth/Throat:      Mouth: Mucous membranes are moist.   Cardiovascular:      Rate and Rhythm: Normal rate and regular rhythm.      Heart sounds: No murmur heard.    No friction rub. No gallop.   Pulmonary:      Effort: Pulmonary effort is normal. No respiratory distress.      Breath sounds: Normal breath sounds. No wheezing or rhonchi.   Neurological:      Mental Status: She is alert and oriented to person, place, and time.   Psychiatric:         Behavior: Behavior normal.           Result Review :              Assessment and Plan  Carolyn Chaparro is a 63 y.o. female returns for follow-up after home sleep test.  HST was obtained on 9/19/2023 revealing severe obstructive sleep apnea with an AHI of 31.7/H.  PAP therapy was recommended.  I have discussed alternatives including MAD, and referral to otolaryngology.     I will place orders for new device and supplies and have asked the patient " return for follow-up and compliance in 31-90 days.  I have noted to the patient that they will need to use the device more than 4 hours a night, more than 70% of the time in order to remain in full compliance.     Diagnoses and all orders for this visit:    1. CJ (obstructive sleep apnea) (Primary)  -     PAP Therapy    2. Overweight (BMI 25.0-29.9)              Follow Up  Return for 31 to 90 days after PAP setup.  Patient was given instructions and counseling regarding her condition or for health maintenance advice. Please see specific information pulled into the AVS if appropriate.       REE Rodrigez, ACNP-BC  Pulmonology, Critical Care, and Sleep Medicine

## 2023-10-16 ENCOUNTER — TELEPHONE (OUTPATIENT)
Dept: PULMONOLOGY | Facility: CLINIC | Age: 63
End: 2023-10-16

## 2023-10-16 NOTE — TELEPHONE ENCOUNTER
Caller: Carolyn Chaparro     Relationship: SELF     Best call back number: [phone]    What is your medical concern? PT NEEDS SETTINGS ADJUSTED, AIR FLOW TURNED DOWN ON CPAP. CAUSING LOTS OF AIR IN BELLY, WAKING PT UP W AIR BLOWING TOO HARD.   [facility] - [phone] FAX   PLEASE SEND IN NEW RX TO [facility] ASAP     How long has this issue been going on? NA    Is your provider already aware of this issue? NO    Have you been treated for this issue? YES

## 2023-10-17 DIAGNOSIS — G47.33 OSA (OBSTRUCTIVE SLEEP APNEA): Primary | ICD-10-CM

## 2023-10-30 DIAGNOSIS — G47.33 OSA (OBSTRUCTIVE SLEEP APNEA): Primary | ICD-10-CM

## 2023-11-16 NOTE — PROGRESS NOTES
Sleep Clinic Follow Up Note    Chief Complaint  Follow-up    Subjective     History of Present Illness (from previous encounter on 9/21/2023):  Carolyn Chaparro is a 63 y.o. female returns for follow-up after home sleep test.  HST was obtained on 9/19/2023 revealing severe obstructive sleep apnea with an AHI of 31.7/H.  PAP therapy was recommended.  I have discussed alternatives including MAD, and referral to otolaryngology.     (End copied text).    Interval History:  Carolyn Chaparro is a 63 y.o. female returns for follow up and compliance of PAP therapy. The patient was last seen on 9/21/2023. Overall the patient feels okay with regard to therapy. She continues to have difficulty with mask and pressure. She is trying hard to use her device but wakes with the pressure. The device appears to be working appropriately. On average the patient sleeps 5-6 hours per night. The patient wakes  2 times per night. She is still quite tired. She is waking with air in her belly and 3-4 times per week.    The patient reports the following changes to their medical and medication history since they were last seen:  None      Further details are as follows:      [place] Scale is (out of 24): Total score: 9     Weight:  Current Weight: 180 lb    The patient's relevant past medical, surgical, family, and social history reviewed and updated in Epic as appropriate.    PMH:    Past Medical History:   Diagnosis Date    Anxiety     Arrhythmia afib after covid    last time was 5/24/23    Arthritis 2011    No problems now.    Atrial fibrillation 05/14/2023    stopped after 90 min. went to ER    Chronic constipation     GERD (gastroesophageal reflux disease)     Headache     Heart palpitations     Hypertension     Myocardial infarction 10/01/18    Spontaneous dissection    CJ (obstructive sleep apnea) 09/18/2023    Osteopenia     Post-menopause on HRT (hormone replacement therapy)     not currently    PVCs (premature  "ventricular contractions)     hx    Spontaneous dissection of coronary artery     \"attempted stent, but heart re-routed itself\"      Past Surgical History:   Procedure Laterality Date    CARDIAC CATHETERIZATION N/A 10/02/2018    Procedure: Left Heart Cath;  Surgeon: Hlelen Allan MD;  Location:  [facility];  Service: Cardiology    COLONOSCOPY      LAPAROSCOPIC ASSISTED VAGINAL HYSTERECTOMY SALPINGO OOPHORECTOMY Bilateral     LAPAROSCOPY WITH LASER      NASAL POLYP EXCISION      benign-caused nose bleeds    PERINEOPLASTY      POSTERIOR REPAIR      SACROCOLPOPEXY N/A 2021    Procedure: SACROCOLPOPEXY LAPAROSCOPIC WITH ROBOTIC WITH MID URETHRAL SLING  CYSTOSCOPY POSTERIOR REPAIR;  Surgeon: Thelma Mortensen MD;  Location:  [facility] OR;  Service: Robotics - [facility];  Laterality: N/A;    WISDOM TOOTH EXTRACTION       OB History          3    Para   2    Term                AB        Living   2         SAB        IAB        Ectopic        Molar        Multiple        Live Births                  Allergies   Allergen Reactions    Bisoprolol Other (See Comments)     Ineffective in controlling palps    Erythromycin Diarrhea       MEDS:  Prior to Admission medications    Medication Sig Start Date End Date Taking? Authorizing Provider   aspirin 81 MG EC tablet Take 1 tablet by mouth Daily.    Provider, MD Vernon   CALCIUM PO Take  by mouth.    Provider, MD Vernon   cefdinir (OMNICEF) 300 MG capsule Take 1 capsule by mouth 2 (Two) Times a Day for 10 days. 23  Rafi Lopez III,    cetirizine (zyrTEC) 10 MG tablet Take 1 tablet by mouth Daily.    Emergency, Nurse Epic, RN   cholecalciferol (VITAMIN D3) 1000 units tablet Take 1 tablet by mouth Daily.    Provider, MD Vernon   metoprolol succinate XL (Toprol XL) 50 MG 24 hr tablet Take 1 tablet by mouth 2 (Two) Times a Day. 23   Hellen Allan MD   Multiple Vitamins-Minerals (MULTIVITAMIN " "ADULT PO) Take 1 tablet by mouth Daily.    Vernon Padilla MD   pantoprazole (PROTONIX) 40 MG EC tablet Take 1 tablet by mouth Daily.    Vernon Padilla MD   Probiotic Product (PROBIOTIC ADVANCED PO) Take 1 tablet by mouth Daily.    Vernon Padilla MD   rosuvastatin (CRESTOR) 5 MG tablet TAKE ONE TABLET BY MOUTH ONCE NIGHTLY 3/20/23   Hellen Allan MD   terazosin (HYTRIN) 2 MG capsule TAKE ONE CAPSULE BY MOUTH ONCE NIGHTLY 6/19/23   Hellen Allan MD   vitamin B-12 (CYANOCOBALAMIN) 500 MCG tablet Take 2 tablets by mouth Daily.    ProviderVernon MD         FH:  Family History   Problem Relation Age of Onset    Stroke Mother         multiple strokes    Heart disease Mother         quad bypass    Hypertension Mother     Heart failure Father         CHF    Thyroid disease Sister     Thyroid disease Sister        Objective   Vital Signs:  /67 (BP Location: Right arm)   Pulse 52   Ht 172.7 cm (68\")   Wt 81.6 kg (180 lb)   SpO2 96%   BMI 27.37 kg/m²           Physical Exam  Vitals reviewed.   Constitutional:       Appearance: Normal appearance.   HENT:      Head: Normocephalic and atraumatic.      Nose: Nose normal.      Mouth/Throat:      Mouth: Mucous membranes are moist.   Cardiovascular:      Rate and Rhythm: Normal rate and regular rhythm.      Heart sounds: No murmur heard.     No friction rub. No gallop.   Pulmonary:      Effort: Pulmonary effort is normal. No respiratory distress.      Breath sounds: Normal breath sounds. No wheezing or rhonchi.   Neurological:      Mental Status: She is alert and oriented to person, place, and time.   Psychiatric:         Behavior: Behavior normal.             Result Review :           PAP Report:  AHI: 5.2/h  Days of Usage: 32/43 (74%)  Number of Days Greater than 4 hours: 15/43 (35%)  Average time (days used): 4 hours 3 minutes  95th Percentile Pressure: 12.3 cm H2O  95th percentile leaks: 18.0 L/min  Settings: Auto " CPAP-8/12 cm H2O, EPR full-time, EPR level 2, response standard.       Assessment and Plan  Carolyn Chaparro is a 63 y.o. female who returns for follow-up and compliance of PAP therapy.  The Pap report has been reviewed.  Overall usage is at 74% with compliance at 35%.  Patient averages 4 hours 3 minutes on nights used.  AHI is 5.2/h.  A home sleep test obtained on 9/19/2023 revealed severe obstructive sleep apnea with an AHI of 31.7/H.  I note an air leak at 18 L/min.  Patient may benefit from mask fitting with [facility].  We may need to consider titration study.     I will refill the patient's supplies, and I have asked her to return in approximately 8 to 10 weeks for recheck.    Diagnoses and all orders for this visit:    1. CJ (obstructive sleep apnea) (Primary)  -     PAP Therapy    2. Overweight (BMI 25.0-29.9)           The patient continues to use and benefit from PAP therapy.    1. The patient was counseled regarding multimodal approach with healthy nutrition, healthy sleep, regular physical activity, social activities, counseling, and medications. Encouraged to practice lateral sleep position. Avoid alcohol and sedatives close to bedtime.     2.  We will refill supplies x1 year.  Return to clinic 1 year or sooner if symptoms warrant. I have reviewed the results of my evaluation and impression and discussed my recommendations in detail with the patient.           Follow Up  Return in about 10 weeks (around 1/31/2024) for Recheck.  Patient was given instructions and counseling regarding her condition or for health maintenance advice. Please see specific information pulled into the AVS if appropriate.       REE Rodrigez, ACNP-BC  Pulmonology, Critical Care, and Sleep Medicine

## 2023-11-22 ENCOUNTER — OFFICE VISIT (OUTPATIENT)
Dept: SLEEP MEDICINE | Facility: HOSPITAL | Age: 63
End: 2023-11-22
Payer: COMMERCIAL

## 2023-11-22 VITALS
SYSTOLIC BLOOD PRESSURE: 147 MMHG | BODY MASS INDEX: 27.28 KG/M2 | DIASTOLIC BLOOD PRESSURE: 67 MMHG | WEIGHT: 180 LBS | HEIGHT: 68 IN | OXYGEN SATURATION: 96 % | HEART RATE: 52 BPM

## 2023-11-22 DIAGNOSIS — E66.3 OVERWEIGHT (BMI 25.0-29.9): ICD-10-CM

## 2023-11-22 DIAGNOSIS — G47.33 OSA (OBSTRUCTIVE SLEEP APNEA): Primary | ICD-10-CM

## 2024-01-02 DIAGNOSIS — G47.33 OSA (OBSTRUCTIVE SLEEP APNEA): Primary | ICD-10-CM

## 2024-01-30 ENCOUNTER — APPOINTMENT (OUTPATIENT)
Dept: GENERAL RADIOLOGY | Facility: HOSPITAL | Age: 64
End: 2024-01-30
Payer: COMMERCIAL

## 2024-01-30 ENCOUNTER — APPOINTMENT (OUTPATIENT)
Dept: CT IMAGING | Facility: HOSPITAL | Age: 64
End: 2024-01-30
Payer: COMMERCIAL

## 2024-01-30 ENCOUNTER — HOSPITAL ENCOUNTER (INPATIENT)
Facility: HOSPITAL | Age: 64
LOS: 1 days | Discharge: HOME | End: 2024-02-01
Attending: EMERGENCY MEDICINE | Admitting: HOSPITALIST
Payer: COMMERCIAL

## 2024-01-30 DIAGNOSIS — R07.9 CHEST PAIN, UNSPECIFIED TYPE: ICD-10-CM

## 2024-01-30 DIAGNOSIS — I71.21 ANEURYSM OF ASCENDING AORTA WITHOUT RUPTURE: ICD-10-CM

## 2024-01-30 DIAGNOSIS — I21.4 NSTEMI (NON-ST ELEVATED MYOCARDIAL INFARCTION): Primary | ICD-10-CM

## 2024-01-30 LAB
ALBUMIN SERPL-MCNC: 4.7 G/DL (ref 3.5–5.2)
ALBUMIN/GLOB SERPL: 2.1 G/DL
ALP SERPL-CCNC: 72 U/L (ref 39–117)
ALT SERPL W P-5'-P-CCNC: 24 U/L (ref 1–33)
ANION GAP SERPL CALCULATED.3IONS-SCNC: 9 MMOL/L (ref 5–15)
APTT PPP: 77.5 SECONDS (ref 60–90)
AST SERPL-CCNC: 29 U/L (ref 1–32)
BASOPHILS # BLD AUTO: 0.06 10*3/MM3 (ref 0–0.2)
BASOPHILS NFR BLD AUTO: 0.9 % (ref 0–1.5)
BILIRUB SERPL-MCNC: 0.3 MG/DL (ref 0–1.2)
BUN SERPL-MCNC: 16 MG/DL (ref 8–23)
BUN/CREAT SERPL: 20.8 (ref 7–25)
CALCIUM SPEC-SCNC: 9.2 MG/DL (ref 8.6–10.5)
CHLORIDE SERPL-SCNC: 99 MMOL/L (ref 98–107)
CO2 SERPL-SCNC: 28 MMOL/L (ref 22–29)
CREAT SERPL-MCNC: 0.77 MG/DL (ref 0.57–1)
DEPRECATED RDW RBC AUTO: 40.7 FL (ref 37–54)
EGFRCR SERPLBLD CKD-EPI 2021: 87.9 ML/MIN/1.73
EOSINOPHIL # BLD AUTO: 0.32 10*3/MM3 (ref 0–0.4)
EOSINOPHIL NFR BLD AUTO: 4.7 % (ref 0.3–6.2)
ERYTHROCYTE [DISTWIDTH] IN BLOOD BY AUTOMATED COUNT: 12.1 % (ref 12.3–15.4)
GEN 5 2HR TROPONIN T REFLEX: 110 NG/L
GLOBULIN UR ELPH-MCNC: 2.2 GM/DL
GLUCOSE SERPL-MCNC: 83 MG/DL (ref 65–99)
HCT VFR BLD AUTO: 40.4 % (ref 34–46.6)
HGB BLD-MCNC: 14 G/DL (ref 12–15.9)
HOLD SPECIMEN: NORMAL
IMM GRANULOCYTES # BLD AUTO: 0.01 10*3/MM3 (ref 0–0.05)
IMM GRANULOCYTES NFR BLD AUTO: 0.1 % (ref 0–0.5)
INR PPP: 0.98 (ref 0.89–1.12)
LIPASE SERPL-CCNC: 25 U/L (ref 13–60)
LYMPHOCYTES # BLD AUTO: 2 10*3/MM3 (ref 0.7–3.1)
LYMPHOCYTES NFR BLD AUTO: 29.5 % (ref 19.6–45.3)
MCH RBC QN AUTO: 31.6 PG (ref 26.6–33)
MCHC RBC AUTO-ENTMCNC: 34.7 G/DL (ref 31.5–35.7)
MCV RBC AUTO: 91.2 FL (ref 79–97)
MONOCYTES # BLD AUTO: 0.77 10*3/MM3 (ref 0.1–0.9)
MONOCYTES NFR BLD AUTO: 11.3 % (ref 5–12)
NEUTROPHILS NFR BLD AUTO: 3.63 10*3/MM3 (ref 1.7–7)
NEUTROPHILS NFR BLD AUTO: 53.5 % (ref 42.7–76)
NRBC BLD AUTO-RTO: 0 /100 WBC (ref 0–0.2)
NT-PROBNP SERPL-MCNC: 215 PG/ML (ref 0–900)
PLATELET # BLD AUTO: 242 10*3/MM3 (ref 140–450)
PMV BLD AUTO: 9.7 FL (ref 6–12)
POTASSIUM SERPL-SCNC: 3.8 MMOL/L (ref 3.5–5.2)
PROT SERPL-MCNC: 6.9 G/DL (ref 6–8.5)
PROTHROMBIN TIME: 13.1 SECONDS (ref 12.2–14.5)
RBC # BLD AUTO: 4.43 10*6/MM3 (ref 3.77–5.28)
SODIUM SERPL-SCNC: 136 MMOL/L (ref 136–145)
TROPONIN T DELTA: 93 NG/L
TROPONIN T SERPL HS-MCNC: 17 NG/L
TSH SERPL DL<=0.05 MIU/L-ACNC: 2.45 UIU/ML (ref 0.27–4.2)
UFH PPP CHRO-ACNC: 0.39 IU/ML (ref 0.3–0.7)
WBC NRBC COR # BLD AUTO: 6.79 10*3/MM3 (ref 3.4–10.8)
WHOLE BLOOD HOLD COAG: NORMAL
WHOLE BLOOD HOLD SPECIMEN: NORMAL

## 2024-01-30 PROCEDURE — 36415 COLL VENOUS BLD VENIPUNCTURE: CPT

## 2024-01-30 PROCEDURE — 25510000001 IOPAMIDOL PER 1 ML: Performed by: EMERGENCY MEDICINE

## 2024-01-30 PROCEDURE — 83880 ASSAY OF NATRIURETIC PEPTIDE: CPT

## 2024-01-30 PROCEDURE — 93005 ELECTROCARDIOGRAM TRACING: CPT

## 2024-01-30 PROCEDURE — 84484 ASSAY OF TROPONIN QUANT: CPT

## 2024-01-30 PROCEDURE — G0378 HOSPITAL OBSERVATION PER HR: HCPCS

## 2024-01-30 PROCEDURE — 85610 PROTHROMBIN TIME: CPT | Performed by: PHYSICIAN ASSISTANT

## 2024-01-30 PROCEDURE — 25010000002 HEPARIN (PORCINE) 25000-0.45 UT/250ML-% SOLUTION: Performed by: PHYSICIAN ASSISTANT

## 2024-01-30 PROCEDURE — 80053 COMPREHEN METABOLIC PANEL: CPT

## 2024-01-30 PROCEDURE — 93005 ELECTROCARDIOGRAM TRACING: CPT | Performed by: EMERGENCY MEDICINE

## 2024-01-30 PROCEDURE — 25010000002 HEPARIN (PORCINE) 25000-0.45 UT/250ML-% SOLUTION

## 2024-01-30 PROCEDURE — 71275 CT ANGIOGRAPHY CHEST: CPT

## 2024-01-30 PROCEDURE — 25010000002 NITROGLYCERIN 200 MCG/ML SOLUTION: Performed by: PHYSICIAN ASSISTANT

## 2024-01-30 PROCEDURE — 84443 ASSAY THYROID STIM HORMONE: CPT | Performed by: INTERNAL MEDICINE

## 2024-01-30 PROCEDURE — 84484 ASSAY OF TROPONIN QUANT: CPT | Performed by: EMERGENCY MEDICINE

## 2024-01-30 PROCEDURE — 25010000002 HEPARIN (PORCINE) PER 1000 UNITS: Performed by: PHYSICIAN ASSISTANT

## 2024-01-30 PROCEDURE — 99223 1ST HOSP IP/OBS HIGH 75: CPT | Performed by: INTERNAL MEDICINE

## 2024-01-30 PROCEDURE — 85730 THROMBOPLASTIN TIME PARTIAL: CPT | Performed by: PHYSICIAN ASSISTANT

## 2024-01-30 PROCEDURE — 85520 HEPARIN ASSAY: CPT | Performed by: PHYSICIAN ASSISTANT

## 2024-01-30 PROCEDURE — 83690 ASSAY OF LIPASE: CPT

## 2024-01-30 PROCEDURE — 85025 COMPLETE CBC W/AUTO DIFF WBC: CPT

## 2024-01-30 PROCEDURE — 71045 X-RAY EXAM CHEST 1 VIEW: CPT

## 2024-01-30 PROCEDURE — 99285 EMERGENCY DEPT VISIT HI MDM: CPT

## 2024-01-30 RX ORDER — SODIUM CHLORIDE 0.9 % (FLUSH) 0.9 %
10 SYRINGE (ML) INJECTION AS NEEDED
Status: DISCONTINUED | OUTPATIENT
Start: 2024-01-30 | End: 2024-02-01 | Stop reason: HOSPADM

## 2024-01-30 RX ORDER — NITROGLYCERIN 20 MG/100ML
10-50 INJECTION INTRAVENOUS
Status: DISCONTINUED | OUTPATIENT
Start: 2024-01-30 | End: 2024-02-01 | Stop reason: HOSPADM

## 2024-01-30 RX ORDER — ASPIRIN 81 MG/1
324 TABLET, CHEWABLE ORAL ONCE
Status: COMPLETED | OUTPATIENT
Start: 2024-01-30 | End: 2024-01-30

## 2024-01-30 RX ORDER — HEPARIN SODIUM 1000 [USP'U]/ML
25 INJECTION, SOLUTION INTRAVENOUS; SUBCUTANEOUS AS NEEDED
Status: DISCONTINUED | OUTPATIENT
Start: 2024-01-30 | End: 2024-01-30

## 2024-01-30 RX ORDER — CLOPIDOGREL BISULFATE 75 MG/1
600 TABLET ORAL ONCE
Status: COMPLETED | OUTPATIENT
Start: 2024-01-30 | End: 2024-01-30

## 2024-01-30 RX ORDER — HYDROCODONE BITARTRATE AND ACETAMINOPHEN 5; 325 MG/1; MG/1
1 TABLET ORAL EVERY 4 HOURS PRN
Status: DISCONTINUED | OUTPATIENT
Start: 2024-01-30 | End: 2024-02-01 | Stop reason: HOSPADM

## 2024-01-30 RX ORDER — SODIUM CHLORIDE 9 MG/ML
40 INJECTION, SOLUTION INTRAVENOUS AS NEEDED
Status: DISCONTINUED | OUTPATIENT
Start: 2024-01-30 | End: 2024-02-01 | Stop reason: HOSPADM

## 2024-01-30 RX ORDER — MORPHINE SULFATE 2 MG/ML
1 INJECTION, SOLUTION INTRAMUSCULAR; INTRAVENOUS EVERY 4 HOURS PRN
Status: DISCONTINUED | OUTPATIENT
Start: 2024-01-30 | End: 2024-02-01 | Stop reason: HOSPADM

## 2024-01-30 RX ORDER — ACETAMINOPHEN 325 MG/1
650 TABLET ORAL EVERY 4 HOURS PRN
Status: DISCONTINUED | OUTPATIENT
Start: 2024-01-30 | End: 2024-02-01 | Stop reason: HOSPADM

## 2024-01-30 RX ORDER — NALOXONE HCL 0.4 MG/ML
0.4 VIAL (ML) INJECTION
Status: DISCONTINUED | OUTPATIENT
Start: 2024-01-30 | End: 2024-02-01 | Stop reason: HOSPADM

## 2024-01-30 RX ORDER — HEPARIN SODIUM 1000 [USP'U]/ML
50 INJECTION, SOLUTION INTRAVENOUS; SUBCUTANEOUS AS NEEDED
Status: DISCONTINUED | OUTPATIENT
Start: 2024-01-30 | End: 2024-01-30

## 2024-01-30 RX ORDER — SODIUM CHLORIDE 0.9 % (FLUSH) 0.9 %
10 SYRINGE (ML) INJECTION EVERY 12 HOURS SCHEDULED
Status: DISCONTINUED | OUTPATIENT
Start: 2024-01-30 | End: 2024-02-01 | Stop reason: HOSPADM

## 2024-01-30 RX ORDER — HEPARIN SODIUM 1000 [USP'U]/ML
4000 INJECTION, SOLUTION INTRAVENOUS; SUBCUTANEOUS ONCE
Qty: 10 ML | Refills: 0 | Status: COMPLETED | OUTPATIENT
Start: 2024-01-30 | End: 2024-01-30

## 2024-01-30 RX ORDER — HEPARIN SODIUM 10000 [USP'U]/100ML
14 INJECTION, SOLUTION INTRAVENOUS
Status: DISCONTINUED | OUTPATIENT
Start: 2024-01-30 | End: 2024-02-01

## 2024-01-30 RX ORDER — NITROGLYCERIN 0.4 MG/1
0.4 TABLET SUBLINGUAL
Status: DISCONTINUED | OUTPATIENT
Start: 2024-01-30 | End: 2024-02-01 | Stop reason: HOSPADM

## 2024-01-30 RX ORDER — ONDANSETRON 2 MG/ML
4 INJECTION INTRAMUSCULAR; INTRAVENOUS EVERY 6 HOURS PRN
Status: DISCONTINUED | OUTPATIENT
Start: 2024-01-30 | End: 2024-02-01 | Stop reason: HOSPADM

## 2024-01-30 RX ORDER — CHOLECALCIFEROL (VITAMIN D3) 125 MCG
5 CAPSULE ORAL NIGHTLY PRN
Status: DISCONTINUED | OUTPATIENT
Start: 2024-01-30 | End: 2024-02-01 | Stop reason: HOSPADM

## 2024-01-30 RX ADMIN — ASPIRIN 324 MG: 81 TABLET, CHEWABLE ORAL at 17:42

## 2024-01-30 RX ADMIN — CLOPIDOGREL BISULFATE 600 MG: 75 TABLET ORAL at 22:16

## 2024-01-30 RX ADMIN — NITROGLYCERIN 10 MCG/MIN: 20 INJECTION INTRAVENOUS at 22:17

## 2024-01-30 RX ADMIN — HEPARIN SODIUM 4000 UNITS: 1000 INJECTION INTRAVENOUS; SUBCUTANEOUS at 22:29

## 2024-01-30 RX ADMIN — HEPARIN SODIUM 12 UNITS/KG/HR: 10000 INJECTION, SOLUTION INTRAVENOUS at 22:29

## 2024-01-30 RX ADMIN — IOPAMIDOL 90 ML: 755 INJECTION, SOLUTION INTRAVENOUS at 21:36

## 2024-01-30 NOTE — Clinical Note
Hemostasis started on the right radial artery. R-Band was used in achieving hemostasis. Radial compression device applied to vessel. Hemostasis achieved successfully. Closure device additional comment: 10 cc of air

## 2024-01-30 NOTE — Clinical Note
The left DP pulse is +2. The right DP pulse is +2. The left PT pulse is +2. The right PT pulse is +2. The right radial pulse is +2.

## 2024-01-30 NOTE — ED PROVIDER NOTES
"Subjective   History of Present Illness  Pt is a 62 yo female presenting to ED with complaints of chest pain. PMHx significant for HTN, HLD, Spontaneous circumflex dissection, Anxiety, CJ and GERD. Pt reports sudden onset of left sided chest pressure that began around 5pm this evening while driving. She reports the pain radiates into her left upper back and left shoulder. She denies syncope, dizziness, SOB, cough or fever. She had a spontaneous circumflex dissection in 2018 and is followed by Dr. Allan. Pt given ASA on arrival to ED. She did have a short episode of Afib with she had Covid 5/2023 but no recurrent episodes. She denies tobacco, drug or ETOH use.    History provided by:  Patient, medical records and relative      Review of Systems   Constitutional:  Negative for fever.   Eyes:  Negative for visual disturbance.   Respiratory:  Negative for cough and shortness of breath.    Cardiovascular:  Positive for chest pain. Negative for leg swelling.   Gastrointestinal:  Negative for abdominal pain, diarrhea, nausea and vomiting.   Musculoskeletal:  Positive for arthralgias. Negative for neck pain.   Neurological:  Negative for dizziness, syncope, speech difficulty, weakness, numbness and headaches.   Psychiatric/Behavioral:  Negative for confusion.        Past Medical History:   Diagnosis Date    Anxiety     Arrhythmia afib after covid    last time was 5/24/23    Arthritis 2011    No problems now.    Atrial fibrillation 05/14/2023    stopped after 90 min. went to ER    Chronic constipation     GERD (gastroesophageal reflux disease)     Headache     Heart palpitations     Hypertension     Myocardial infarction 10/01/18    Spontaneous dissection    CJ (obstructive sleep apnea) 09/18/2023    Osteopenia     Post-menopause on HRT (hormone replacement therapy)     not currently    PVCs (premature ventricular contractions)     hx    Spontaneous dissection of coronary artery     \"attempted stent, but heart " "re-routed itself\"        Allergies   Allergen Reactions    Bisoprolol Other (See Comments)     Ineffective in controlling palps    Erythromycin Diarrhea       Past Surgical History:   Procedure Laterality Date    CARDIAC CATHETERIZATION N/A 10/02/2018    Procedure: Left Heart Cath;  Surgeon: Hellen Allan MD;  Location:  [facility];  Service: Cardiology    COLONOSCOPY      LAPAROSCOPIC ASSISTED VAGINAL HYSTERECTOMY SALPINGO OOPHORECTOMY Bilateral     LAPAROSCOPY WITH LASER      NASAL POLYP EXCISION  2022    benign-caused nose bleeds    PERINEOPLASTY      POSTERIOR REPAIR      SACROCOLPOPEXY N/A 05/13/2021    Procedure: SACROCOLPOPEXY LAPAROSCOPIC WITH ROBOTIC WITH MID URETHRAL SLING  CYSTOSCOPY POSTERIOR REPAIR;  Surgeon: Thelma Mortensen MD;  Location:  [facility] OR;  Service: Robotics - DaVinci;  Laterality: N/A;    WISDOM TOOTH EXTRACTION         Family History   Problem Relation Age of Onset    Stroke Mother         multiple strokes    Heart disease Mother         quad bypass    Hypertension Mother     Heart failure Father         CHF    Thyroid disease Sister     Thyroid disease Sister        Social History     Socioeconomic History    Marital status:    Tobacco Use    Smoking status: Never    Smokeless tobacco: Never   Vaping Use    Vaping Use: Never used   Substance and Sexual Activity    Alcohol use: Never    Drug use: Never    Sexual activity: Yes     Partners: Male     Birth control/protection: Post-menopausal, Hysterectomy           Objective   Physical Exam  Vitals and nursing note reviewed.   Constitutional:       General: She is not in acute distress.  HENT:      Head: Atraumatic.   Eyes:      Extraocular Movements: Extraocular movements intact.   Cardiovascular:      Rate and Rhythm: Bradycardia present.      Heart sounds: Normal heart sounds.   Pulmonary:      Effort: Pulmonary effort is normal. No tachypnea.      Breath sounds: Normal breath sounds. No decreased " breath sounds.   Chest:      Chest wall: No tenderness.   Abdominal:      Palpations: Abdomen is soft.      Tenderness: There is no abdominal tenderness.   Musculoskeletal:         General: Normal range of motion.      Cervical back: Normal range of motion and neck supple.   Skin:     General: Skin is warm.   Neurological:      General: No focal deficit present.      Mental Status: She is alert and oriented to person, place, and time.      Motor: No weakness.   Psychiatric:         Mood and Affect: Mood normal.         Behavior: Behavior normal.         Procedures           ED Course  ED Course as of 01/30/24 2239 Tue Jan 30, 2024 2135 HS Troponin T(!!): 110  Prior was 17. Significant delta change of 93 [RT]   2135 Spoke with cardiologist on call Dr. Morrison. He recommended Heparin, Plavix and Nitro drip. [RT]   2208 Discussed admission with hospitalist Dr. Rodriguez [RT]      ED Course User Index  [RT] Mikaela Tuttle PA      Recent Results (from the past 24 hour(s))   ECG 12 Lead ED Triage Standing Order; Chest Pain    Collection Time: 01/30/24  5:34 PM   Result Value Ref Range    QT Interval 460 ms    QTC Interval 397 ms   High Sensitivity Troponin T    Collection Time: 01/30/24  5:39 PM    Specimen: Blood   Result Value Ref Range    HS Troponin T 17 (H) <14 ng/L   Comprehensive Metabolic Panel    Collection Time: 01/30/24  5:39 PM    Specimen: Blood   Result Value Ref Range    Glucose 83 65 - 99 mg/dL    BUN 16 8 - 23 mg/dL    Creatinine 0.77 0.57 - 1.00 mg/dL    Sodium 136 136 - 145 mmol/L    Potassium 3.8 3.5 - 5.2 mmol/L    Chloride 99 98 - 107 mmol/L    CO2 28.0 22.0 - 29.0 mmol/L    Calcium 9.2 8.6 - 10.5 mg/dL    Total Protein 6.9 6.0 - 8.5 g/dL    Albumin 4.7 3.5 - 5.2 g/dL    ALT (SGPT) 24 1 - 33 U/L    AST (SGOT) 29 1 - 32 U/L    Alkaline Phosphatase 72 39 - 117 U/L    Total Bilirubin 0.3 0.0 - 1.2 mg/dL    Globulin 2.2 gm/dL    A/G Ratio 2.1 g/dL    BUN/Creatinine Ratio 20.8 7.0 - 25.0    Anion Gap 9.0  5.0 - 15.0 mmol/L    eGFR 87.9 >60.0 mL/min/1.73   Lipase    Collection Time: 01/30/24  5:39 PM    Specimen: Blood   Result Value Ref Range    Lipase 25 13 - 60 U/L   BNP    Collection Time: 01/30/24  5:39 PM    Specimen: Blood   Result Value Ref Range    proBNP 215.0 0.0 - 900.0 pg/mL   Green Top (Gel)    Collection Time: 01/30/24  5:39 PM   Result Value Ref Range    Extra Tube Hold for add-ons.    Lavender Top    Collection Time: 01/30/24  5:39 PM   Result Value Ref Range    Extra Tube hold for add-on    Gold Top - SST    Collection Time: 01/30/24  5:39 PM   Result Value Ref Range    Extra Tube Hold for add-ons.    Gray Top    Collection Time: 01/30/24  5:39 PM   Result Value Ref Range    Extra Tube Hold for add-ons.    Light Blue Top    Collection Time: 01/30/24  5:39 PM   Result Value Ref Range    Extra Tube Hold for add-ons.    CBC Auto Differential    Collection Time: 01/30/24  5:39 PM    Specimen: Blood   Result Value Ref Range    WBC 6.79 3.40 - 10.80 10*3/mm3    RBC 4.43 3.77 - 5.28 10*6/mm3    Hemoglobin 14.0 12.0 - 15.9 g/dL    Hematocrit 40.4 34.0 - 46.6 %    MCV 91.2 79.0 - 97.0 fL    MCH 31.6 26.6 - 33.0 pg    MCHC 34.7 31.5 - 35.7 g/dL    RDW 12.1 (L) 12.3 - 15.4 %    RDW-SD 40.7 37.0 - 54.0 fl    MPV 9.7 6.0 - 12.0 fL    Platelets 242 140 - 450 10*3/mm3    Neutrophil % 53.5 42.7 - 76.0 %    Lymphocyte % 29.5 19.6 - 45.3 %    Monocyte % 11.3 5.0 - 12.0 %    Eosinophil % 4.7 0.3 - 6.2 %    Basophil % 0.9 0.0 - 1.5 %    Immature Grans % 0.1 0.0 - 0.5 %    Neutrophils, Absolute 3.63 1.70 - 7.00 10*3/mm3    Lymphocytes, Absolute 2.00 0.70 - 3.10 10*3/mm3    Monocytes, Absolute 0.77 0.10 - 0.90 10*3/mm3    Eosinophils, Absolute 0.32 0.00 - 0.40 10*3/mm3    Basophils, Absolute 0.06 0.00 - 0.20 10*3/mm3    Immature Grans, Absolute 0.01 0.00 - 0.05 10*3/mm3    nRBC 0.0 0.0 - 0.2 /100 WBC   Protime-INR    Collection Time: 01/30/24  5:39 PM    Specimen: Blood   Result Value Ref Range    Protime 13.1 12.2 - 14.5  Seconds    INR 0.98 0.89 - 1.12   ECG 12 Lead Chest Pain    Collection Time: 01/30/24  8:41 PM   Result Value Ref Range    QT Interval 424 ms    QTC Interval 430 ms   High Sensitivity Troponin T 2Hr    Collection Time: 01/30/24  8:44 PM    Specimen: Arm, Right; Blood   Result Value Ref Range    HS Troponin T 110 (C) <14 ng/L    Troponin T Delta 93 (C) >=-4 - <+4 ng/L     Note: In addition to lab results from this visit, the labs listed above may include labs taken at another facility or during a different encounter within the last 24 hours. Please correlate lab times with ED admission and discharge times for further clarification of the services performed during this visit.    CT Angiogram Chest   Final Result   1.No evidence for acute intrathoracic abnormality.   2.Stable ascending thoracic aortic aneurysm measuring 4.1 cm in transverse dimension.         Electronically Signed: Mayco Duran MD     1/30/2024 9:56 PM EST     Workstation ID: [id number]      XR Chest 1 View   Final Result   No evidence of acute disease in the chest.      Electronically Signed: Herbert Griffin MD     1/30/2024 7:09 PM EST     Workstation ID: [id number]        Vitals:    01/30/24 2100 01/30/24 2200 01/30/24 2201 01/30/24 2202   BP: 166/86 175/95     BP Location:       Patient Position:       Pulse: 61 64 59 62   Resp:       Temp:       TempSrc:       SpO2: 99% 99% 99% 100%   Weight:       Height:         Medications   sodium chloride 0.9 % flush 10 mL (has no administration in time range)   nitroglycerin (TRIDIL) 200 mcg/ml infusion (10 mcg/min Intravenous New Bag 1/30/24 2217)   heparin 47718 units/250 mL (100 units/mL) in 0.45 % NaCl infusion (12 Units/kg/hr × 79.4 kg Intravenous New Bag 1/30/24 2229)   Pharmacy to Dose Heparin (has no administration in time range)   Pharmacy Consult - MTM (has no administration in time range)   sodium chloride 0.9 % flush 10 mL (has no administration in time range)   sodium chloride 0.9 % flush 10 mL  (has no administration in time range)   sodium chloride 0.9 % infusion 40 mL (has no administration in time range)   nitroglycerin (NITROSTAT) SL tablet 0.4 mg (has no administration in time range)   Potassium Replacement - Follow Nurse / BPA Driven Protocol (has no administration in time range)   Magnesium Standard Dose Replacement - Follow Nurse / BPA Driven Protocol (has no administration in time range)   Phosphorus Replacement - Follow Nurse / BPA Driven Protocol (has no administration in time range)   Calcium Replacement - Follow Nurse / BPA Driven Protocol (has no administration in time range)   acetaminophen (TYLENOL) tablet 650 mg (has no administration in time range)   HYDROcodone-acetaminophen (NORCO) 5-325 MG per tablet 1 tablet (has no administration in time range)   morphine injection 1 mg (has no administration in time range)     And   naloxone (NARCAN) injection 0.4 mg (has no administration in time range)   melatonin tablet 5 mg (has no administration in time range)   ondansetron (ZOFRAN) injection 4 mg (has no administration in time range)   aspirin chewable tablet 324 mg (324 mg Oral Given 1/30/24 1742)   iopamidol (ISOVUE-370) 76 % injection 100 mL (90 mL Intravenous Given 1/30/24 2136)   clopidogrel (PLAVIX) tablet 600 mg (600 mg Oral Given 1/30/24 2216)   heparin (porcine) injection 4,000 Units (4,000 Units Intravenous Given 1/30/24 2229)     ECG/EMG Results (last 24 hours)       Procedure Component Value Units Date/Time    ECG 12 Lead ED Triage Standing Order; Chest Pain [[id number]] Collected: 01/30/24 1734     Updated: 01/30/24 1735     QT Interval 460 ms      QTC Interval 397 ms     Narrative:      Test Reason : ED Triage Standing Order~  Blood Pressure :   */*   mmHG  Vent. Rate :  45 BPM     Atrial Rate :   * BPM     P-R Int :   * ms          QRS Dur :  96 ms      QT Int : 460 ms       P-R-T Axes :   *  10   6 degrees     QTc Int : 397 ms    Junctional rhythm  Nonspecific ST  abnormality  Abnormal ECG  When compared with ECG of 14-MAY-2023 03:32,  Junctional rhythm has replaced Sinus rhythm  Vent. rate has decreased BY  26 BPM    Referred By:            Confirmed By:     ECG 12 Lead Chest Pain [[id number]] Collected: 01/30/24 2041     Updated: 01/30/24 2042     QT Interval 424 ms      QTC Interval 430 ms     Narrative:      Test Reason : Chest Pain  Blood Pressure :   */*   mmHG  Vent. Rate :  62 BPM     Atrial Rate :  62 BPM     P-R Int : 160 ms          QRS Dur : 100 ms      QT Int : 424 ms       P-R-T Axes :  48  38  62 degrees     QTc Int : 430 ms    Normal sinus rhythm  Septal infarct , age undetermined  Abnormal ECG  When compared with ECG of 30-JAN-2024 17:34, (Unconfirmed)  Sinus rhythm has replaced Junctional rhythm  Septal infarct is now present  Nonspecific T wave abnormality, worse in Lateral leads    Referred By: EDMD           Confirmed By:           ECG 12 Lead Chest Pain   Preliminary Result   Test Reason : Chest Pain   Blood Pressure :   */*   mmHG   Vent. Rate :  62 BPM     Atrial Rate :  62 BPM      P-R Int : 160 ms          QRS Dur : 100 ms       QT Int : 424 ms       P-R-T Axes :  48  38  62 degrees      QTc Int : 430 ms      Normal sinus rhythm   Septal infarct , age undetermined   Abnormal ECG   When compared with ECG of 30-JAN-2024 17:34, (Unconfirmed)   Sinus rhythm has replaced Junctional rhythm   Septal infarct is now present   Nonspecific T wave abnormality, worse in Lateral leads      Referred By: EDMD           Confirmed By:       ECG 12 Lead ED Triage Standing Order; Chest Pain   Preliminary Result   Test Reason : ED Triage Standing Order~   Blood Pressure :   */*   mmHG   Vent. Rate :  45 BPM     Atrial Rate :   * BPM      P-R Int :   * ms          QRS Dur :  96 ms       QT Int : 460 ms       P-R-T Axes :   *  10   6 degrees      QTc Int : 397 ms      Junctional rhythm   Nonspecific ST abnormality   Abnormal ECG   When compared with ECG of 14-MAY-2023  03:32,   Junctional rhythm has replaced Sinus rhythm   Vent. rate has decreased BY  26 BPM      Referred By:            Confirmed By:       ECG 12 Lead ED Triage Standing Order; Chest Pain    (Results Pending)             [id number] Score: 2     HEART Score: 7                              Medical Decision Making  Pt is a 64 yo female presenting to ED with complaints of chest pain. EKG non specific without acute ST elevation. Labs notable for HS Trop at 17 with repeat bumping to 110. , WBC 6.79, Cr 0.77, Glucose 86 and K 3.8. CTA chest obtained and no PE or dissection. Noted ascending aortic aneurysm at 4.1 and compared to prior at 3.9 in 2018. Discussed results and tx plan with patient and . Discussed plan for admission, Nitro drip, Heparin and Plavix.     Discussed patient with Dr. Thayer who is agreeable with ED course and tx plan.   Discussed patient with cardiologist Dr. Morrison  Discussed admission with hospitalist Dr. Rodriguez.     DDx  ACS, NSTEMI, PE, Dissection, Pneumonia, CHF    [id number] Score 2  Heart Score 7  SHEILA Score 3    Problems Addressed:  Aneurysm of ascending aorta without rupture: complicated acute illness or injury  Chest pain, unspecified type: complicated acute illness or injury  NSTEMI (non-ST elevated myocardial infarction): complicated acute illness or injury    Amount and/or Complexity of Data Reviewed  External Data Reviewed: notes.     Details: Reviewed previous non ED visits including prior labs, imaging, available notes, medications, allergies and surgical hx.     Labs: ordered. Decision-making details documented in ED Course.  Radiology: ordered. Decision-making details documented in ED Course.  ECG/medicine tests: ordered. Decision-making details documented in ED Course.    Risk  OTC drugs.  Prescription drug management.  Decision regarding hospitalization.        Final diagnoses:   NSTEMI (non-ST elevated myocardial infarction)   Aneurysm of ascending aorta without  rupture   Chest pain, unspecified type       ED Disposition  ED Disposition       ED Disposition   Decision to Admit    Condition   --    Comment   Level of Care: Telemetry [5]   Diagnosis: NSTEMI (non-ST elevated myocardial infarction) [779544]   Admitting Physician: MARGOT LOPEZ III [[id number]]   Attending Physician: MARGOT LOPEZ III [[id number]]   Bed Request Comments: tele obs (not CDU)                 No follow-up provider specified.       Medication List      No changes were made to your prescriptions during this visit.            Mikaela Tuttle PA  01/30/24 2235       Mikaela Ttutle PA  01/30/24 2233

## 2024-01-31 ENCOUNTER — TRANSCRIBE ORDERS (OUTPATIENT)
Dept: CARDIAC REHAB | Facility: HOSPITAL | Age: 64
End: 2024-01-31
Payer: COMMERCIAL

## 2024-01-31 ENCOUNTER — APPOINTMENT (OUTPATIENT)
Dept: CARDIOLOGY | Facility: HOSPITAL | Age: 64
End: 2024-01-31
Payer: COMMERCIAL

## 2024-01-31 DIAGNOSIS — I21.4 NSTEMI, INITIAL EPISODE OF CARE: Primary | ICD-10-CM

## 2024-01-31 LAB
ALBUMIN SERPL-MCNC: 4.1 G/DL (ref 3.5–5.2)
ALBUMIN/GLOB SERPL: 1.9 G/DL
ALP SERPL-CCNC: 59 U/L (ref 39–117)
ALT SERPL W P-5'-P-CCNC: 23 U/L (ref 1–33)
ANION GAP SERPL CALCULATED.3IONS-SCNC: 12 MMOL/L (ref 5–15)
ASCENDING AORTA: 3.9 CM
AST SERPL-CCNC: 50 U/L (ref 1–32)
BASOPHILS # BLD AUTO: 0.06 10*3/MM3 (ref 0–0.2)
BASOPHILS NFR BLD AUTO: 0.7 % (ref 0–1.5)
BH CV ECHO MEAS - AO MAX PG: 6.4 MMHG
BH CV ECHO MEAS - AO MEAN PG: 3.5 MMHG
BH CV ECHO MEAS - AO ROOT DIAM: 3.8 CM
BH CV ECHO MEAS - AO V2 MAX: 126.5 CM/SEC
BH CV ECHO MEAS - AO V2 VTI: 35.1 CM
BH CV ECHO MEAS - AVA(I,D): 2.41 CM2
BH CV ECHO MEAS - EDV(CUBED): 157.5 ML
BH CV ECHO MEAS - EDV(MOD-SP2): 99.4 ML
BH CV ECHO MEAS - EDV(MOD-SP4): 98.3 ML
BH CV ECHO MEAS - EF(MOD-BP): 59 %
BH CV ECHO MEAS - EF(MOD-SP2): 63.5 %
BH CV ECHO MEAS - EF(MOD-SP4): 54.5 %
BH CV ECHO MEAS - ESV(CUBED): 59.3 ML
BH CV ECHO MEAS - ESV(MOD-SP2): 36.3 ML
BH CV ECHO MEAS - ESV(MOD-SP4): 44.7 ML
BH CV ECHO MEAS - FS: 27.8 %
BH CV ECHO MEAS - IVS/LVPW: 1.11 CM
BH CV ECHO MEAS - IVSD: 1 CM
BH CV ECHO MEAS - LA DIMENSION: 3.5 CM
BH CV ECHO MEAS - LAT PEAK E' VEL: 5.6 CM/SEC
BH CV ECHO MEAS - LV MASS(C)D: 193.3 GRAMS
BH CV ECHO MEAS - LV MAX PG: 3.4 MMHG
BH CV ECHO MEAS - LV MEAN PG: 2 MMHG
BH CV ECHO MEAS - LV V1 MAX: 92.2 CM/SEC
BH CV ECHO MEAS - LV V1 VTI: 26.9 CM
BH CV ECHO MEAS - LVIDD: 5.4 CM
BH CV ECHO MEAS - LVIDS: 3.9 CM
BH CV ECHO MEAS - LVOT AREA: 3.1 CM2
BH CV ECHO MEAS - LVOT DIAM: 2 CM
BH CV ECHO MEAS - LVPWD: 0.9 CM
BH CV ECHO MEAS - MED PEAK E' VEL: 5.6 CM/SEC
BH CV ECHO MEAS - MR MAX PG: 23.4 MMHG
BH CV ECHO MEAS - MR MAX VEL: 242 CM/SEC
BH CV ECHO MEAS - MV A MAX VEL: 61.3 CM/SEC
BH CV ECHO MEAS - MV DEC SLOPE: 134 CM/SEC2
BH CV ECHO MEAS - MV DEC TIME: 0.38 SEC
BH CV ECHO MEAS - MV E MAX VEL: 49.8 CM/SEC
BH CV ECHO MEAS - MV E/A: 0.81
BH CV ECHO MEAS - MV MAX PG: 2.5 MMHG
BH CV ECHO MEAS - MV MEAN PG: 1 MMHG
BH CV ECHO MEAS - MV P1/2T: 134.6 MSEC
BH CV ECHO MEAS - MV V2 VTI: 27.7 CM
BH CV ECHO MEAS - MVA(P1/2T): 1.63 CM2
BH CV ECHO MEAS - MVA(VTI): 3.1 CM2
BH CV ECHO MEAS - PA ACC TIME: 0.16 SEC
BH CV ECHO MEAS - PA V2 MAX: 81.8 CM/SEC
BH CV ECHO MEAS - PI END-D VEL: 99.5 CM/SEC
BH CV ECHO MEAS - RAP SYSTOLE: 3 MMHG
BH CV ECHO MEAS - RVSP: 24 MMHG
BH CV ECHO MEAS - SV(LVOT): 84.5 ML
BH CV ECHO MEAS - SV(MOD-SP2): 63.1 ML
BH CV ECHO MEAS - SV(MOD-SP4): 53.6 ML
BH CV ECHO MEAS - TAPSE (>1.6): 1.88 CM
BH CV ECHO MEAS - TR MAX PG: 20.8 MMHG
BH CV ECHO MEAS - TR MAX VEL: 228 CM/SEC
BH CV ECHO MEASUREMENTS AVERAGE E/E' RATIO: 8.89
BH CV XLRA - RV BASE: 3.6 CM
BH CV XLRA - RV LENGTH: 8.4 CM
BH CV XLRA - RV MID: 2.8 CM
BH CV XLRA - TDI S': 11 CM/SEC
BILIRUB SERPL-MCNC: 0.4 MG/DL (ref 0–1.2)
BUN SERPL-MCNC: 14 MG/DL (ref 8–23)
BUN/CREAT SERPL: 22.6 (ref 7–25)
CALCIUM SPEC-SCNC: 8.7 MG/DL (ref 8.6–10.5)
CHLORIDE SERPL-SCNC: 99 MMOL/L (ref 98–107)
CHOLEST SERPL-MCNC: 123 MG/DL (ref 0–200)
CO2 SERPL-SCNC: 22 MMOL/L (ref 22–29)
CREAT SERPL-MCNC: 0.62 MG/DL (ref 0.57–1)
DEPRECATED RDW RBC AUTO: 38.9 FL (ref 37–54)
EGFRCR SERPLBLD CKD-EPI 2021: 101.5 ML/MIN/1.73
EOSINOPHIL # BLD AUTO: 0.09 10*3/MM3 (ref 0–0.4)
EOSINOPHIL NFR BLD AUTO: 1.1 % (ref 0.3–6.2)
ERYTHROCYTE [DISTWIDTH] IN BLOOD BY AUTOMATED COUNT: 12 % (ref 12.3–15.4)
GLOBULIN UR ELPH-MCNC: 2.2 GM/DL
GLUCOSE SERPL-MCNC: 136 MG/DL (ref 65–99)
HBA1C MFR BLD: 4.9 % (ref 4.8–5.6)
HCT VFR BLD AUTO: 36.2 % (ref 34–46.6)
HDLC SERPL-MCNC: 44 MG/DL (ref 40–60)
HGB BLD-MCNC: 12.8 G/DL (ref 12–15.9)
IMM GRANULOCYTES # BLD AUTO: 0.03 10*3/MM3 (ref 0–0.05)
IMM GRANULOCYTES NFR BLD AUTO: 0.4 % (ref 0–0.5)
LDLC SERPL CALC-MCNC: 67 MG/DL (ref 0–100)
LDLC/HDLC SERPL: 1.54 {RATIO}
LV EF 2D ECHO EST: 60 %
LYMPHOCYTES # BLD AUTO: 1.25 10*3/MM3 (ref 0.7–3.1)
LYMPHOCYTES NFR BLD AUTO: 15.2 % (ref 19.6–45.3)
MAGNESIUM SERPL-MCNC: 2 MG/DL (ref 1.6–2.4)
MCH RBC QN AUTO: 31.1 PG (ref 26.6–33)
MCHC RBC AUTO-ENTMCNC: 35.4 G/DL (ref 31.5–35.7)
MCV RBC AUTO: 87.9 FL (ref 79–97)
MONOCYTES # BLD AUTO: 0.6 10*3/MM3 (ref 0.1–0.9)
MONOCYTES NFR BLD AUTO: 7.3 % (ref 5–12)
NEUTROPHILS NFR BLD AUTO: 6.22 10*3/MM3 (ref 1.7–7)
NEUTROPHILS NFR BLD AUTO: 75.3 % (ref 42.7–76)
NRBC BLD AUTO-RTO: 0 /100 WBC (ref 0–0.2)
PLATELET # BLD AUTO: 201 10*3/MM3 (ref 140–450)
PMV BLD AUTO: 10.1 FL (ref 6–12)
POTASSIUM SERPL-SCNC: 3.7 MMOL/L (ref 3.5–5.2)
PROT SERPL-MCNC: 6.3 G/DL (ref 6–8.5)
QT INTERVAL: 484 MS
QTC INTERVAL: 484 MS
RBC # BLD AUTO: 4.12 10*6/MM3 (ref 3.77–5.28)
SODIUM SERPL-SCNC: 133 MMOL/L (ref 136–145)
TRIGL SERPL-MCNC: 56 MG/DL (ref 0–150)
TROPONIN T SERPL HS-MCNC: 467 NG/L
UFH PPP CHRO-ACNC: 0.1 IU/ML (ref 0.3–0.7)
UFH PPP CHRO-ACNC: 0.18 IU/ML (ref 0.3–0.7)
UFH PPP CHRO-ACNC: 0.23 IU/ML (ref 0.3–0.7)
VLDLC SERPL-MCNC: 12 MG/DL (ref 5–40)
WBC NRBC COR # BLD AUTO: 8.25 10*3/MM3 (ref 3.4–10.8)

## 2024-01-31 PROCEDURE — 25510000001 IOPAMIDOL PER 1 ML: Performed by: INTERNAL MEDICINE

## 2024-01-31 PROCEDURE — 25010000002 HEPARIN (PORCINE) PER 1000 UNITS

## 2024-01-31 PROCEDURE — 99222 1ST HOSP IP/OBS MODERATE 55: CPT | Performed by: INTERNAL MEDICINE

## 2024-01-31 PROCEDURE — 25010000002 FENTANYL CITRATE (PF) 50 MCG/ML SOLUTION: Performed by: INTERNAL MEDICINE

## 2024-01-31 PROCEDURE — 25010000002 PROCHLORPERAZINE 10 MG/2ML SOLUTION: Performed by: HOSPITALIST

## 2024-01-31 PROCEDURE — 80053 COMPREHEN METABOLIC PANEL: CPT | Performed by: INTERNAL MEDICINE

## 2024-01-31 PROCEDURE — C1769 GUIDE WIRE: HCPCS | Performed by: INTERNAL MEDICINE

## 2024-01-31 PROCEDURE — 25010000002 HEPARIN (PORCINE) 25000-0.45 UT/250ML-% SOLUTION: Performed by: INTERNAL MEDICINE

## 2024-01-31 PROCEDURE — 84484 ASSAY OF TROPONIN QUANT: CPT | Performed by: INTERNAL MEDICINE

## 2024-01-31 PROCEDURE — 25010000002 ONDANSETRON PER 1 MG: Performed by: INTERNAL MEDICINE

## 2024-01-31 PROCEDURE — 93458 L HRT ARTERY/VENTRICLE ANGIO: CPT | Performed by: INTERNAL MEDICINE

## 2024-01-31 PROCEDURE — 93306 TTE W/DOPPLER COMPLETE: CPT | Performed by: INTERNAL MEDICINE

## 2024-01-31 PROCEDURE — 93306 TTE W/DOPPLER COMPLETE: CPT

## 2024-01-31 PROCEDURE — 85520 HEPARIN ASSAY: CPT

## 2024-01-31 PROCEDURE — 85520 HEPARIN ASSAY: CPT | Performed by: INTERNAL MEDICINE

## 2024-01-31 PROCEDURE — 25010000002 MIDAZOLAM PER 1 MG: Performed by: INTERNAL MEDICINE

## 2024-01-31 PROCEDURE — 25010000002 HEPARIN (PORCINE) PER 1000 UNITS: Performed by: INTERNAL MEDICINE

## 2024-01-31 PROCEDURE — 83036 HEMOGLOBIN GLYCOSYLATED A1C: CPT | Performed by: INTERNAL MEDICINE

## 2024-01-31 PROCEDURE — 99233 SBSQ HOSP IP/OBS HIGH 50: CPT | Performed by: HOSPITALIST

## 2024-01-31 PROCEDURE — 93005 ELECTROCARDIOGRAM TRACING: CPT | Performed by: INTERNAL MEDICINE

## 2024-01-31 PROCEDURE — 85025 COMPLETE CBC W/AUTO DIFF WBC: CPT | Performed by: PHYSICIAN ASSISTANT

## 2024-01-31 PROCEDURE — 83735 ASSAY OF MAGNESIUM: CPT | Performed by: INTERNAL MEDICINE

## 2024-01-31 PROCEDURE — 4A023N7 MEASUREMENT OF CARDIAC SAMPLING AND PRESSURE, LEFT HEART, PERCUTANEOUS APPROACH: ICD-10-PCS | Performed by: INTERNAL MEDICINE

## 2024-01-31 PROCEDURE — 80061 LIPID PANEL: CPT | Performed by: INTERNAL MEDICINE

## 2024-01-31 PROCEDURE — B2111ZZ FLUOROSCOPY OF MULTIPLE CORONARY ARTERIES USING LOW OSMOLAR CONTRAST: ICD-10-PCS | Performed by: INTERNAL MEDICINE

## 2024-01-31 PROCEDURE — C1894 INTRO/SHEATH, NON-LASER: HCPCS | Performed by: INTERNAL MEDICINE

## 2024-01-31 RX ORDER — METOPROLOL SUCCINATE 50 MG/1
TABLET, EXTENDED RELEASE ORAL
COMMUNITY

## 2024-01-31 RX ORDER — CLOPIDOGREL BISULFATE 75 MG/1
75 TABLET ORAL DAILY
Status: DISCONTINUED | OUTPATIENT
Start: 2024-02-01 | End: 2024-01-31

## 2024-01-31 RX ORDER — PANTOPRAZOLE SODIUM 40 MG/1
40 TABLET, DELAYED RELEASE ORAL DAILY
Status: DISCONTINUED | OUTPATIENT
Start: 2024-01-31 | End: 2024-02-01 | Stop reason: HOSPADM

## 2024-01-31 RX ORDER — ASPIRIN 81 MG/1
81 TABLET ORAL DAILY
Status: DISCONTINUED | OUTPATIENT
Start: 2024-02-01 | End: 2024-01-31

## 2024-01-31 RX ORDER — PROCHLORPERAZINE EDISYLATE 5 MG/ML
10 INJECTION INTRAMUSCULAR; INTRAVENOUS ONCE
Status: COMPLETED | OUTPATIENT
Start: 2024-01-31 | End: 2024-01-31

## 2024-01-31 RX ORDER — ROSUVASTATIN CALCIUM 10 MG/1
5 TABLET, COATED ORAL NIGHTLY
Status: DISCONTINUED | OUTPATIENT
Start: 2024-01-31 | End: 2024-02-01 | Stop reason: HOSPADM

## 2024-01-31 RX ORDER — ROSUVASTATIN CALCIUM 5 MG/1
5 TABLET, COATED ORAL NIGHTLY
COMMUNITY
End: 2024-02-01 | Stop reason: HOSPADM

## 2024-01-31 RX ORDER — LIDOCAINE HYDROCHLORIDE 10 MG/ML
INJECTION, SOLUTION EPIDURAL; INFILTRATION; INTRACAUDAL; PERINEURAL
Status: DISCONTINUED | OUTPATIENT
Start: 2024-01-31 | End: 2024-01-31 | Stop reason: HOSPADM

## 2024-01-31 RX ORDER — CLOPIDOGREL BISULFATE 75 MG/1
75 TABLET ORAL DAILY
Status: DISCONTINUED | OUTPATIENT
Start: 2024-01-31 | End: 2024-02-01 | Stop reason: HOSPADM

## 2024-01-31 RX ORDER — MIDAZOLAM HYDROCHLORIDE 1 MG/ML
INJECTION INTRAMUSCULAR; INTRAVENOUS
Status: DISCONTINUED | OUTPATIENT
Start: 2024-01-31 | End: 2024-01-31 | Stop reason: HOSPADM

## 2024-01-31 RX ORDER — TERAZOSIN 2 MG/1
2 CAPSULE ORAL NIGHTLY
Status: DISCONTINUED | OUTPATIENT
Start: 2024-01-31 | End: 2024-02-01 | Stop reason: HOSPADM

## 2024-01-31 RX ORDER — POLYETHYLENE GLYCOL 3350 17 G/17G
17 POWDER, FOR SOLUTION ORAL 2 TIMES DAILY
Status: DISCONTINUED | OUTPATIENT
Start: 2024-01-31 | End: 2024-01-31

## 2024-01-31 RX ORDER — TERAZOSIN 2 MG/1
2 CAPSULE ORAL NIGHTLY
COMMUNITY

## 2024-01-31 RX ORDER — IBUPROFEN 200 MG
400 TABLET ORAL 2 TIMES DAILY PRN
COMMUNITY

## 2024-01-31 RX ORDER — PHENOL 1.4 %
600 AEROSOL, SPRAY (ML) MUCOUS MEMBRANE DAILY
COMMUNITY

## 2024-01-31 RX ORDER — HEPARIN SODIUM 1000 [USP'U]/ML
INJECTION, SOLUTION INTRAVENOUS; SUBCUTANEOUS
Status: DISCONTINUED | OUTPATIENT
Start: 2024-01-31 | End: 2024-01-31 | Stop reason: HOSPADM

## 2024-01-31 RX ORDER — LANOLIN ALCOHOL/MO/W.PET/CERES
200 CREAM (GRAM) TOPICAL DAILY
Status: ON HOLD | COMMUNITY
End: 2024-01-31

## 2024-01-31 RX ORDER — ASPIRIN 81 MG/1
81 TABLET ORAL DAILY
Status: DISCONTINUED | OUTPATIENT
Start: 2024-01-31 | End: 2024-02-01 | Stop reason: HOSPADM

## 2024-01-31 RX ORDER — HEPARIN SODIUM 1000 [USP'U]/ML
2000 INJECTION, SOLUTION INTRAVENOUS; SUBCUTANEOUS ONCE
Status: COMPLETED | OUTPATIENT
Start: 2024-01-31 | End: 2024-01-31

## 2024-01-31 RX ORDER — POLYETHYLENE GLYCOL 3350 17 G/17G
17 POWDER, FOR SOLUTION ORAL
Status: DISCONTINUED | OUTPATIENT
Start: 2024-02-01 | End: 2024-02-01 | Stop reason: HOSPADM

## 2024-01-31 RX ORDER — FENTANYL CITRATE 50 UG/ML
INJECTION, SOLUTION INTRAMUSCULAR; INTRAVENOUS
Status: DISCONTINUED | OUTPATIENT
Start: 2024-01-31 | End: 2024-01-31 | Stop reason: HOSPADM

## 2024-01-31 RX ORDER — NITROGLYCERIN 0.4 MG/1
0.4 TABLET SUBLINGUAL
Status: DISCONTINUED | OUTPATIENT
Start: 2024-01-31 | End: 2024-01-31 | Stop reason: SDUPTHER

## 2024-01-31 RX ORDER — METOPROLOL SUCCINATE 50 MG/1
50 TABLET, EXTENDED RELEASE ORAL 2 TIMES DAILY
Status: DISCONTINUED | OUTPATIENT
Start: 2024-01-31 | End: 2024-02-01 | Stop reason: HOSPADM

## 2024-01-31 RX ADMIN — ACETAMINOPHEN 650 MG: 325 TABLET ORAL at 08:03

## 2024-01-31 RX ADMIN — PROCHLORPERAZINE EDISYLATE 10 MG: 5 INJECTION INTRAMUSCULAR; INTRAVENOUS at 09:48

## 2024-01-31 RX ADMIN — HEPARIN SODIUM 2000 UNITS: 1000 INJECTION INTRAVENOUS; SUBCUTANEOUS at 07:13

## 2024-01-31 RX ADMIN — ROSUVASTATIN CALCIUM 5 MG: 10 TABLET, FILM COATED ORAL at 01:18

## 2024-01-31 RX ADMIN — METOPROLOL SUCCINATE 50 MG: 50 TABLET, EXTENDED RELEASE ORAL at 21:59

## 2024-01-31 RX ADMIN — CLOPIDOGREL BISULFATE 75 MG: 75 TABLET ORAL at 18:24

## 2024-01-31 RX ADMIN — METOPROLOL SUCCINATE 50 MG: 50 TABLET, EXTENDED RELEASE ORAL at 01:18

## 2024-01-31 RX ADMIN — HYDROCODONE BITARTRATE AND ACETAMINOPHEN 1 TABLET: 5; 325 TABLET ORAL at 01:20

## 2024-01-31 RX ADMIN — TERAZOSIN HYDROCHLORIDE 2 MG: 2 CAPSULE ORAL at 22:00

## 2024-01-31 RX ADMIN — PANTOPRAZOLE SODIUM 40 MG: 40 TABLET, DELAYED RELEASE ORAL at 05:52

## 2024-01-31 RX ADMIN — Medication 5 MG: at 21:59

## 2024-01-31 RX ADMIN — HEPARIN SODIUM 14 UNITS/KG/HR: 10000 INJECTION, SOLUTION INTRAVENOUS at 16:11

## 2024-01-31 RX ADMIN — TERAZOSIN HYDROCHLORIDE 2 MG: 2 CAPSULE ORAL at 01:18

## 2024-01-31 RX ADMIN — ONDANSETRON 4 MG: 2 INJECTION INTRAMUSCULAR; INTRAVENOUS at 08:03

## 2024-01-31 RX ADMIN — ASPIRIN 81 MG: 81 TABLET, COATED ORAL at 18:24

## 2024-01-31 RX ADMIN — ROSUVASTATIN CALCIUM 5 MG: 10 TABLET, FILM COATED ORAL at 21:59

## 2024-01-31 RX ADMIN — Medication 10 ML: at 09:48

## 2024-01-31 NOTE — H&P
"    [facility]  HISTORY AND PHYSICAL    Patient Name: Carolyn Chaparro  : 1960  MRN: [id number]  Primary Care Physician: Erika Hernandez PA  Date of admission: 2024      Subjective   Subjective     Chief Complaint:  Chest pain    HPI:  Carolyn Chaparro is a 63 y.o. female who states that at around 5:10 PM earlier today () while driving home from work, she had onset of chest tightness with some pain.  She states the tightness was located in the center of the chest with a similar sensation in the left arm and in her back as well.  She states she did have some pain, including \"a couple of sharp ones\" that only lasted a couple seconds before resolving on their own.  Upon returning home, she checked her blood pressure and found a systolic reading of 205.  Because she continued to have some persistent lower level pain at home, she decided to come to the ED for further evaluation.  She denies any shortness of breath, nausea/emesis, diaphoresis, or loss of consciousness.  Workup in the ED here included checking troponin x 2; first study returned a value of 17, but approximately 2 hours later her troponin had increased to 110.  Cardiologist on-call was contacted by the ED, who recommended aspirin and Plavix as well as nitroglycerin and heparin drips.  She also denies fever/chills, slurred speech/facial droop, dizziness/lightheadedness, visual changes, headache, focal weakness, abdominal pain, bowel habit change.  Her medical history is significant for hypertension and hyperlipidemia.  She also states that many years ago she had a spontaneous dissection of circumflex coronary artery; she states this was originally stented but then she states that she recalls the stent was removed.  She also states that when she was diagnosed with COVID she had approximately 90 minutes of atrial fibrillation which resolved on its own, and then had another " "recurrence of approximately 90 minutes of atrial fibrillation on a cruise within the last year, but has had no recurrences, and she is not actively treated for it.  She also has history of an abdominal aortic aneurysm which is monitored closely as an outpatient; workup in the ED here also included CTA chest which states that the aneurysm currently measures 4.1 cm and is stable.      Personal History     Past Medical History:   Diagnosis Date    Anxiety     Arrhythmia afib after covid    last time was 5/24/23    Arthritis 2011    No problems now.    Atrial fibrillation 05/14/2023    stopped after 90 min. went to ER    Chronic constipation     GERD (gastroesophageal reflux disease)     Headache     Heart palpitations     Hypertension     Myocardial infarction 10/01/18    Spontaneous dissection    CJ (obstructive sleep apnea) 09/18/2023    Osteopenia     Post-menopause on HRT (hormone replacement therapy)     not currently    PVCs (premature ventricular contractions)     hx    Spontaneous dissection of coronary artery     \"attempted stent, but heart re-routed itself\"            Past Surgical History:   Procedure Laterality Date    CARDIAC CATHETERIZATION N/A 10/02/2018    Procedure: Left Heart Cath;  Surgeon: Hellen Allan MD;  Location:  [facility] CATH INVASIVE LOCATION;  Service: Cardiology    COLONOSCOPY      LAPAROSCOPIC ASSISTED VAGINAL HYSTERECTOMY SALPINGO OOPHORECTOMY Bilateral     LAPAROSCOPY WITH LASER      NASAL POLYP EXCISION  2022    benign-caused nose bleeds    PERINEOPLASTY      POSTERIOR REPAIR      SACROCOLPOPEXY N/A 05/13/2021    Procedure: SACROCOLPOPEXY LAPAROSCOPIC WITH ROBOTIC WITH MID URETHRAL SLING  CYSTOSCOPY POSTERIOR REPAIR;  Surgeon: Thelma Mortensen MD;  Location:  [facility] OR;  Service: Robotics - DaVinci;  Laterality: N/A;    WISDOM TOOTH EXTRACTION         Family History: family history includes Heart disease in her mother; Heart failure in her father; Hypertension in her mother; " Stroke in her mother; Thyroid disease in her sister and sister.     Social History:  reports that she has never smoked. She has never used smokeless tobacco. She reports that she does not drink alcohol and does not use drugs.  Social History     Social History Narrative    Not on file       Medications:  Available home medication information reviewed.  Calcium, Probiotic Product, aspirin, cetirizine, cholecalciferol, metoprolol succinate XL, multivitamin with minerals, pantoprazole, rosuvastatin, terazosin, and vitamin B-12    Allergies   Allergen Reactions    Bisoprolol Other (See Comments)     Ineffective in controlling palps    Erythromycin Diarrhea       Objective   Objective     Vital Signs:   Temp:  [97.5 °F (36.4 °C)] 97.5 °F (36.4 °C)  Heart Rate:  [41-64] 62  Resp:  [16] 16  BP: (166-198)/(86-95) 175/95       Physical Exam   Constitutional: Awake, alert, NAD, pleasant.  Eyes: PERRLA, sclerae anicteric, no conjunctival injection  HENT: NCAT, mucous membranes moist  Neck: Supple, no thyromegaly, no lymphadenopathy, trachea midline  Respiratory: Clear to auscultation bilaterally, nonlabored respirations   Cardiovascular: RRR, no murmurs, rubs, or gallops, palpable pedal pulses bilaterally  Gastrointestinal: Positive bowel sounds, soft, nontender, nondistended  Musculoskeletal: No bilateral ankle edema, no clubbing or cyanosis to extremities  Psychiatric: Appropriate affect, cooperative  Neurologic: Oriented x 3, strength symmetric in all extremities, Cranial Nerves grossly intact to confrontation, speech clear  Skin: No rashes, normal turgor.    Result Review:  I have personally reviewed the results from the time of this admission to 1/30/2024 22:22 EST and agree with these findings:  [x]  Laboratory list / accordion  []  Microbiology  [x]  Radiology  [x]  EKG/Telemetry   []  Cardiology/Vascular   []  Pathology  []  Old records  []  Other:  Most notable findings include: I reviewed EKG which by my read shows  normal sinus rhythm, ventricular rate approximately 60 bpm, normal axis, nonspecific ST/T wave changes.  I reviewed EKG which by my read shows no acute infiltrate/edema on this single AP view.      LAB RESULTS:      Lab 01/30/24  1739   WBC 6.79   HEMOGLOBIN 14.0   HEMATOCRIT 40.4   PLATELETS 242   NEUTROS ABS 3.63   IMMATURE GRANS (ABS) 0.01   LYMPHS ABS 2.00   MONOS ABS 0.77   EOS ABS 0.32   MCV 91.2   PROTIME 13.1   INR 0.98         Lab 01/30/24  1739   SODIUM 136   POTASSIUM 3.8   CHLORIDE 99   CO2 28.0   ANION GAP 9.0   BUN 16   CREATININE 0.77   EGFR 87.9   GLUCOSE 83   CALCIUM 9.2         Lab 01/30/24  1739   TOTAL PROTEIN 6.9   ALBUMIN 4.7   GLOBULIN 2.2   ALT (SGPT) 24   AST (SGOT) 29   BILIRUBIN 0.3   ALK PHOS 72   LIPASE 25         Lab 01/30/24 2044 01/30/24  1739   PROBNP  --  215.0   HSTROP T 110* 17*                     Microbiology Results (last 10 days)       ** No results found for the last 240 hours. **            CT Angiogram Chest    Result Date: 1/30/2024  CT ANGIOGRAM CHEST Date of Exam: 1/30/2024 9:28 PM EST Indication: CP into back, hx of coronary artery dissection. Comparison: 10/3/2018 Technique: CTA of the chest was performed after the uneventful intravenous administration of 90 mL Isovue-370. Reconstructed coronal and sagittal images were also obtained. In addition, a 3-D volume rendered image was created for interpretation. Automated exposure control and iterative reconstruction methods were used. FINDINGS: Scattered atelectasis is noted. No well-defined consolidations or pleural effusions are observed. A calcified granuloma is noted in the right lower lobe. No new suspicious pulmonary nodules or abnormal pulmonary masses are seen. There is aneurysmal dilatation of the ascending thoracic aorta measures 4.1 cm in transverse dimension. This finding is stable. There is no evidence for aortic dissection. Mild atherosclerotic changes are noted. There is a separate origin of the left  vertebral artery at the aortic arch. No significant hilar, mediastinal, or axillary lymphadenopathy is observed. No significant coronary artery calcifications are identified. The thyroid gland is unremarkable. The esophagus is unremarkable. The limited evaluation of the upper abdomen demonstrates no evidence for acute abnormality. No acute osseous abnormalities are observed.     Impression: 1.No evidence for acute intrathoracic abnormality. 2.Stable ascending thoracic aortic aneurysm measuring 4.1 cm in transverse dimension. Electronically Signed: Mayco Duran MD  1/30/2024 9:56 PM EST  Workstation ID: [id number]    XR Chest 1 View    Result Date: 1/30/2024  XR CHEST 1 VW Date of Exam: 1/30/2024 6:08 PM EST Indication: Chest Pain Triage Protocol Comparison: 5/14/2023. FINDINGS: No focal airspace opacity. No pleural effusion or pneumothorax. Normal heart and mediastinal contours.     Impression: No evidence of acute disease in the chest. Electronically Signed: Herbert Griffin MD  1/30/2024 7:09 PM EST  Workstation ID: [id number]     Results for orders placed during the hospital encounter of 10/12/22    Adult Transthoracic Echo Complete W/ Cont if Necessary Per Protocol    Interpretation Summary    Estimated left ventricular EF = 55% Left ventricular systolic function is normal.    Left ventricular diastolic function was normal.    Trace to mild mitral and tricuspid regurgitation.    Trace aortic insufficiency.    Calculated right ventricular systolic pressure from tricuspid regurgitation is 7 mmHg.      Assessment & Plan   Assessment & Plan       NSTEMI (non-ST elevated myocardial infarction)        63F with non-STEMI    Non-STEMI  History of spontaneous circumflex coronary artery dissection  Hypertension  Hyperlipidemia  - Continue aspirin, statin, metoprolol from home regimen.  - Continue heparin and nitroglycerin drips for now.  - Continue telemetry.  - 2D echo.  - AM EKG and troponin.  - N.p.o. after midnight.  -  Check TSH, HbA1c, lipid panel.  - Cardiology consult requested (she sees Dr. Allan), input appreciated, will follow up their recommendations.    Stable abdominal aortic aneurysm  - On heparin drip.  - Daily aspirin and statin.  - Radiology read from CTA chest states this is stable, she is monitored as outpatient for this.    Remote history of atrial fibrillation  - She states this occurred twice, both approximately 90 minutes in duration, no active treatment for this at this time.  - Continue telemetry.        Total time spent: 80 minutes  Time spent includes time reviewing chart, face-to-face time, counseling patient/family/caregiver, ordering medications/tests/procedures, communicating with other health care professionals, documenting clinical information in the electronic health record, and coordination of care.       DVT prophylaxis:  Heparin gtt          CODE STATUS:  full  Code Status and Medical Interventions:   Ordered at: 01/30/24 2222     Level Of Support Discussed With:    Patient     Code Status (Patient has no pulse and is not breathing):    CPR (Attempt to Resuscitate)     Medical Interventions (Patient has pulse or is breathing):    Full Support       Expected Discharge   tbshaneka Leroy,III, DO  01/30/24

## 2024-01-31 NOTE — CONSULTS
[facility] at [facility]  CARDIOLOGY CONSULTATION NOTE  Carolyn Chaparro  [id number]  1960   LOS: 0 days   Patient Care Team:  Erika Hernandez PA as PCP - General (Physician Assistant)  Reed Scanlon MD (Inactive) as Consulting Physician (Gynecology)  Hellen Allan MD as Consulting Physician (Cardiology)    Reason for Consultation: NSTEMI    Problem List:  Coronary artery dissection:  LHC/NSTEMI, 10/02/2018: Spontaneous circumflex dissection with improvement in flow initially following an attempted wiring of the vessel. With ongoing attempts at advancing the wire however the dissection propagated proximally. With the use of Cardene, nitroglycerin, and Integrilin flow was improved. No evidence of LAD or RCA disease.  Echo, 10/02/2018: EF 60%. Trace Mr and TR.  24h Holter, 12/19/2018: occasional PVC's, rare couplets, rare PAC's, brief atrial tachycardia, 13 beats.  Echo, 07/18/2019: EF 60%. LV systolic function is normal. No wall motion normalities are identified.  Echo, 10/12/2022: EF 55%. Trace to mild MR/TR. Trace aortic insufficiency. RVSP 7 mmHg.  Paroxymal atrial fibrillation  [facility] ED, 05/14/2023: Atrial fibrillation with RVR  CHADS-VASc=2 (female, HTN)  Palpitations:  2 week monitor, 09/20/2019: Occasional PVCs (1.8%), occasional couplets. Brief atach, longest 14 beats.  Hypertension  Hyperlipidemia  Snoring  GERD        History of Present Illness:      Carolyn Chaparro is a 63 y.o. female past medical history significant for left coronary artery dissection, PAF, palpitations, hypertension, hyperlipidemia, and presumed sleep apnea presented to [facility] on 1/30 with sudden onset of chest tightness and pain.  Pain radiated to her left arm and back.  Checking her blood pressure systolic was over 200.  High-sensitivity troponins -467.  EKG shows no acute ischemic changes.  Patient is on heparin drip.  Patient currently denies  "chest pain, shortness of breath, palpitations, lightheadedness/dizziness, and syncope.    Cardiac risk factors: dyslipidemia and hypertension.    Allergies   Allergen Reactions    Bisoprolol Other (See Comments)     Ineffective in controlling palps    Erythromycin Diarrhea       Past Medical History:   Diagnosis Date    Anxiety     Arrhythmia afib after covid    last time was 5/24/23    Arthritis 2011    No problems now.    Atrial fibrillation 05/14/2023    stopped after 90 min. went to ER    Chronic constipation     GERD (gastroesophageal reflux disease)     Headache     Heart palpitations     Hypertension     Myocardial infarction 10/01/18    Spontaneous dissection    CJ (obstructive sleep apnea) 09/18/2023    Osteopenia     Post-menopause on HRT (hormone replacement therapy)     not currently    PVCs (premature ventricular contractions)     hx    Spontaneous dissection of coronary artery     \"attempted stent, but heart re-routed itself\"       Past Surgical History:   Procedure Laterality Date    CARDIAC CATHETERIZATION N/A 10/02/2018    Procedure: Left Heart Cath;  Surgeon: Hellen Allan MD;  Location:  [facility] CATH INVASIVE LOCATION;  Service: Cardiology    COLONOSCOPY      LAPAROSCOPIC ASSISTED VAGINAL HYSTERECTOMY SALPINGO OOPHORECTOMY Bilateral     LAPAROSCOPY WITH LASER      NASAL POLYP EXCISION  2022    benign-caused nose bleeds    PERINEOPLASTY      POSTERIOR REPAIR      SACROCOLPOPEXY N/A 05/13/2021    Procedure: SACROCOLPOPEXY LAPAROSCOPIC WITH ROBOTIC WITH MID URETHRAL SLING  CYSTOSCOPY POSTERIOR REPAIR;  Surgeon: Thelma Mortensen MD;  Location: [facility] OR;  Service: Robotics - DaVinci;  Laterality: N/A;    WISDOM TOOTH EXTRACTION        Social History     Socioeconomic History    Marital status:    Tobacco Use    Smoking status: Never    Smokeless tobacco: Never   Vaping Use    Vaping Use: Never used   Substance and Sexual Activity    Alcohol use: Never    Drug use: Never    Sexual " activity: Yes     Partners: Male     Birth control/protection: Post-menopausal, Hysterectomy     Family History   Problem Relation Age of Onset    Stroke Mother         multiple strokes    Heart disease Mother         quad bypass    Hypertension Mother     Heart failure Father         CHF    Thyroid disease Sister     Thyroid disease Sister        Medications Prior to Admission   Medication Sig Dispense Refill Last Dose    aspirin 81 MG EC tablet Take 1 tablet by mouth Daily.   1/30/2024    CALCIUM PO Take  by mouth.   1/30/2024    cetirizine (zyrTEC) 10 MG tablet Take 1 tablet by mouth Daily.   Past Week    cholecalciferol (VITAMIN D3) 1000 units tablet Take 1 tablet by mouth Daily.   1/30/2024    ibuprofen (ADVIL,MOTRIN) 200 MG tablet Take 2 tablets by mouth 2 (Two) Times a Day As Needed for Mild Pain (For headaches).   1/30/2024    MAGNESIUM GLYCINATE PO Take 200 mg by mouth Daily.   Past Week    metoprolol succinate XL (Toprol XL) 50 MG 24 hr tablet Take 1 tablet by mouth 2 (Two) Times a Day. (Patient taking differently: Take 1 tablet by mouth 2 (Two) Times a Day. Takes 25 mg in the morning, and 50 mg at night) 180 tablet 3 Patient Taking Differently    Multiple Vitamins-Minerals (MULTIVITAMIN ADULT PO) Take 1 tablet by mouth Daily.   1/30/2024    pantoprazole (PROTONIX) 40 MG EC tablet Take 1 tablet by mouth Daily.   1/30/2024    Probiotic Product (PROBIOTIC ADVANCED PO) Take 1 tablet by mouth Daily.   Past Week    rosuvastatin (CRESTOR) 5 MG tablet TAKE ONE TABLET BY MOUTH ONCE NIGHTLY 90 tablet 3 Past Week    terazosin (HYTRIN) 2 MG capsule TAKE ONE CAPSULE BY MOUTH ONCE NIGHTLY 90 capsule 3 Past Week    vitamin B-12 (CYANOCOBALAMIN) 500 MCG tablet Take 2 tablets by mouth Daily.   1/30/2024     Scheduled Meds:metoprolol succinate XL, 50 mg, Oral, BID  pantoprazole, 40 mg, Oral, Daily  pharmacy consult - MTM, , Does not apply, Daily  rosuvastatin, 5 mg, Oral, Nightly  sodium chloride, 10 mL, Intravenous,  "Q12H  terazosin, 2 mg, Oral, Nightly      Continuous Infusions:heparin, 14 Units/kg/hr, Last Rate: 14 Units/kg/hr (01/31/24 0713)  nitroglycerin, 10-50 mcg/min, Last Rate: 25 mcg/min (01/31/24 0821)  Pharmacy to Dose Heparin,       PRN Meds:.  acetaminophen    Calcium Replacement - Follow Nurse / BPA Driven Protocol    HYDROcodone-acetaminophen    Magnesium Standard Dose Replacement - Follow Nurse / BPA Driven Protocol    melatonin    Morphine **AND** naloxone    nitroglycerin    ondansetron    Pharmacy to Dose Heparin    Phosphorus Replacement - Follow Nurse / BPA Driven Protocol    Potassium Replacement - Follow Nurse / BPA Driven Protocol    sodium chloride    sodium chloride    sodium chloride    Review of Systems  All systems have been reviewed and are negative with the exception of those mentioned in the HPI and problem list above.     Objective:       Physical Exam  /55   Pulse 58   Temp 98 °F (36.7 °C) (Oral)   Resp 18   Ht 175.3 cm (69\")   Wt 79.7 kg (175 lb 9.6 oz)   LMP  (LMP Unknown)   SpO2 98%   BMI 25.93 kg/m²       01/30/24  1726 01/31/24  0500   Weight: 79.4 kg (175 lb) 79.7 kg (175 lb 9.6 oz)     Body mass index is 25.93 kg/m².    Intake/Output Summary (Last 24 hours) at 1/31/2024 0829  Last data filed at 1/31/2024 0010  Gross per 24 hour   Intake --   Output 300 ml   Net -300 ml       Physical Exam  General Appearance:  Alert, cooperative, no distress, appears stated age   Neck: Supple, symmetrical, trachea midline, no carotid bruit or JVD   Lungs:   Clear to auscultation bilaterally, respirations unlabored   Heart:  Regular rate and rhythm, S1, S2 normal, no murmur, rub or gallop   Extremities: No edema, normal range of motion   Pulses: 2+ and symmetric   Skin: Skin color, texture, turgor normal, no rashes or lesions   Neurologic: Normal     Cardiographics  EKG: NSR  ECHO: Results for orders placed during the hospital encounter of 10/12/22    Adult Transthoracic Echo Complete W/ Cont " if Necessary Per Protocol    Interpretation Summary    Estimated left ventricular EF = 55% Left ventricular systolic function is normal.    Left ventricular diastolic function was normal.    Trace to mild mitral and tricuspid regurgitation.    Trace aortic insufficiency.    Calculated right ventricular systolic pressure from tricuspid regurgitation is 7 mmHg.       Imaging  Chest x-ray: CT Angiogram Chest    Result Date: 1/30/2024  1.No evidence for acute intrathoracic abnormality. 2.Stable ascending thoracic aortic aneurysm measuring 4.1 cm in transverse dimension. Electronically Signed: Mayco Duran MD  1/30/2024 9:56 PM EST  Workstation ID: [id number]    XR Chest 1 View    Result Date: 1/30/2024  No evidence of acute disease in the chest. Electronically Signed: Herbert Griffin MD  1/30/2024 7:09 PM EST  Workstation ID: [id number]      Lab Review   Results from last 7 days   Lab Units 01/31/24  0446 01/30/24  1739   SODIUM mmol/L 133* 136   POTASSIUM mmol/L 3.7 3.8   CHLORIDE mmol/L 99 99   CO2 mmol/L 22.0 28.0   BUN mg/dL 14 16   CREATININE mg/dL 0.62 0.77   GLUCOSE mg/dL 136* 83   CALCIUM mg/dL 8.7 9.2     Results from last 7 days   Lab Units 01/31/24  0446 01/30/24  1739   WBC 10*3/mm3 8.25 6.79   HEMOGLOBIN g/dL 12.8 14.0   HEMATOCRIT % 36.2 40.4   PLATELETS 10*3/mm3 201 242     Results from last 7 days   Lab Units 01/31/24  0446   CHOLESTEROL mg/dL 123   TRIGLYCERIDES mg/dL 56   HDL CHOL mg/dL 44   LDL CHOL mg/dL 67     Results from last 7 days   Lab Units 01/31/24  0446   HEMOGLOBIN A1C % 4.90     Results from last 7 days   Lab Units 01/31/24  0446 01/30/24  2044 01/30/24  1739   HSTROP T ng/L 467* 110* 17*          NSTEMI (non-ST elevated myocardial infarction)        Assessment:     NSTEMI  Patient on heparin and nitroglycerin drip  Echo pending  History of spontaneous circumflex coronary artery dissection  Hypertension  Hyperlipidemia  Remote history of atrial fibrillation  She reports 2 episodes that are  approximately 90 minutes in duration with no treatment     Plan:       Continue heparin and nitroglycerin drip  Echo pending  Plans for left heart catheterization +/- CBI today.  Procedure, risk, and alternatives have been discussed with the patient and she is agreeable to proceed.  Further recommendations to follow.    Scribed for Yo Grewal MD by REE Parikh, 01/31/24, 8:29 AM EST.     Please note that portions of this note were dictated utilizing Dragon dictation.

## 2024-01-31 NOTE — PROGRESS NOTES
[facility] Medicine Services  PROGRESS NOTE    Patient Name: Carolyn Chaparro  : 1960  MRN: [id number]    Date of Admission: 2024  Primary Care Physician: Erika Hernandez PA    Subjective   Subjective     CC:  Chest pain    HPI:  Patient resting in bed with family at bedside. Denied current chest pain. Nervous about LHC. No new complaints.      Objective   Objective     Vital Signs:   Temp:  [97.5 °F (36.4 °C)-98 °F (36.7 °C)] 98 °F (36.7 °C)  Heart Rate:  [41-67] 46  Resp:  [14-18] 14  BP: (101-198)/(53-95) 133/82     Physical Exam:  Constitutional: No acute distress, awake, alert  HENT: NCAT, mucous membranes moist  Respiratory: Clear to auscultation bilaterally, respiratory effort normal   Cardiovascular: RRR, no murmurs, rubs, or gallops  Gastrointestinal: Positive bowel sounds, soft, nontender, nondistended  Musculoskeletal: No bilateral ankle edema  Psychiatric: Appropriate affect, cooperative  Neurologic: Oriented x 3, strength symmetric in all extremities, Cranial Nerves grossly intact to confrontation, speech clear  Skin: No rashes    Results Reviewed:  LAB RESULTS:      Lab 24  1739   WBC 8.25  --  6.79   HEMOGLOBIN 12.8  --  14.0   HEMATOCRIT 36.2  --  40.4   PLATELETS 201  --  242   NEUTROS ABS 6.22  --  3.63   IMMATURE GRANS (ABS) 0.03  --  0.01   LYMPHS ABS 1.25  --  2.00   MONOS ABS 0.60  --  0.77   EOS ABS 0.09  --  0.32   MCV 87.9  --  91.2   PROTIME  --   --  13.1   APTT  --  77.5  --    HEPARIN ANTI-XA 0.18* 0.39  --          Lab 24  1739   SODIUM 133*  --  136   POTASSIUM 3.7  --  3.8   CHLORIDE 99  --  99   CO2 22.0  --  28.0   ANION GAP 12.0  --  9.0   BUN 14  --  16   CREATININE 0.62  --  0.77   EGFR 101.5  --  87.9   GLUCOSE 136*  --  83   CALCIUM 8.7  --  9.2   MAGNESIUM 2.0  --   --    HEMOGLOBIN A1C 4.90  --   --    TSH  --  2.450  --          Lab 24  2514  01/30/24  1739   TOTAL PROTEIN 6.3 6.9   ALBUMIN 4.1 4.7   GLOBULIN 2.2 2.2   ALT (SGPT) 23 24   AST (SGOT) 50* 29   BILIRUBIN 0.4 0.3   ALK PHOS 59 72   LIPASE  --  25         Lab 01/31/24  0446 01/30/24 2044 01/30/24  1739   PROBNP  --   --  215.0   HSTROP T 467* 110* 17*   PROTIME  --   --  13.1   INR  --   --  0.98         Lab 01/31/24  0446   CHOLESTEROL 123   LDL CHOL 67   HDL CHOL 44   TRIGLYCERIDES 56             Brief Urine Lab Results       None            Microbiology Results Abnormal       None            Cardiac Catheterization/Vascular Study    Result Date: 1/31/2024    Occluded D1 in a small distal segment, possibly related to SCAD given her history   Improved left circumflex flow, healed from prior angiogram in 2018 (related to SCAD)   Otherwise minimal disease a diffusely tortuous right dominant system   Normal LVEDP of 12mmHg   Medical management of NSTEMI with heparin for 48hrs and DAPT for 12 months     CT Angiogram Chest    Result Date: 1/30/2024  CT ANGIOGRAM CHEST Date of Exam: 1/30/2024 9:28 PM EST Indication: CP into back, hx of coronary artery dissection. Comparison: 10/3/2018 Technique: CTA of the chest was performed after the uneventful intravenous administration of 90 mL Isovue-370. Reconstructed coronal and sagittal images were also obtained. In addition, a 3-D volume rendered image was created for interpretation. Automated exposure control and iterative reconstruction methods were used. FINDINGS: Scattered atelectasis is noted. No well-defined consolidations or pleural effusions are observed. A calcified granuloma is noted in the right lower lobe. No new suspicious pulmonary nodules or abnormal pulmonary masses are seen. There is aneurysmal dilatation of the ascending thoracic aorta measures 4.1 cm in transverse dimension. This finding is stable. There is no evidence for aortic dissection. Mild atherosclerotic changes are noted. There is a separate origin of the left vertebral artery  at the aortic arch. No significant hilar, mediastinal, or axillary lymphadenopathy is observed. No significant coronary artery calcifications are identified. The thyroid gland is unremarkable. The esophagus is unremarkable. The limited evaluation of the upper abdomen demonstrates no evidence for acute abnormality. No acute osseous abnormalities are observed.     Impression: 1.No evidence for acute intrathoracic abnormality. 2.Stable ascending thoracic aortic aneurysm measuring 4.1 cm in transverse dimension. Electronically Signed: Mayco Duran MD  1/30/2024 9:56 PM EST  Workstation ID: [id number]    XR Chest 1 View    Result Date: 1/30/2024  XR CHEST 1 VW Date of Exam: 1/30/2024 6:08 PM EST Indication: Chest Pain Triage Protocol Comparison: 5/14/2023. FINDINGS: No focal airspace opacity. No pleural effusion or pneumothorax. Normal heart and mediastinal contours.     Impression: No evidence of acute disease in the chest. Electronically Signed: Herbert Griffin MD  1/30/2024 7:09 PM EST  Workstation ID: [id number]     Results for orders placed during the hospital encounter of 10/12/22    Adult Transthoracic Echo Complete W/ Cont if Necessary Per Protocol    Interpretation Summary    Estimated left ventricular EF = 55% Left ventricular systolic function is normal.    Left ventricular diastolic function was normal.    Trace to mild mitral and tricuspid regurgitation.    Trace aortic insufficiency.    Calculated right ventricular systolic pressure from tricuspid regurgitation is 7 mmHg.      Current medications:  Scheduled Meds:metoprolol succinate XL, 50 mg, Oral, BID  pantoprazole, 40 mg, Oral, Daily  pharmacy consult - [place], , Does not apply, Daily  rosuvastatin, 5 mg, Oral, Nightly  sodium chloride, 10 mL, Intravenous, Q12H  terazosin, 2 mg, Oral, Nightly      Continuous Infusions:heparin, 14 Units/kg/hr, Last Rate: Stopped (01/31/24 1030)  nitroglycerin, 10-50 mcg/min, Last Rate: Stopped (01/31/24 1000)  Pharmacy to  Dose Heparin,       PRN Meds:.  acetaminophen    Calcium Replacement - Follow Nurse / BPA Driven Protocol    HYDROcodone-acetaminophen    Magnesium Standard Dose Replacement - Follow Nurse / BPA Driven Protocol    melatonin    Morphine **AND** naloxone    nitroglycerin    ondansetron    Pharmacy to Dose Heparin    Phosphorus Replacement - Follow Nurse / BPA Driven Protocol    Potassium Replacement - Follow Nurse / BPA Driven Protocol    sodium chloride    sodium chloride    sodium chloride    Assessment & Plan   Assessment & Plan     Active Hospital Problems    Diagnosis  POA    **NSTEMI (non-ST elevated myocardial infarction) [I21.4]  Yes      Resolved Hospital Problems   No resolved problems to display.        Brief Hospital Course to date:  Carolyn Chaparro is a 63 y.o. female that presented to the ED complaining of chest pain and tightness that began prior to arrival in the ED on 1/30/24. Troponin continued to trend up and patient was taken for a left heart cath on 1/31/24.    This patient's problems and plans were partially entered by my partner and updated as appropriate by me 01/31/24.    All problems are new to me today.    Non-STEMI  History of spontaneous circumflex coronary artery dissection  Hypertension  Hyperlipidemia  - Continue aspirin, statin, metoprolol from home regimen.  - Continue heparin gtt  - Continue telemetry.  - 2D echo.  - C per Dr. Grewal 1/31/24:  well healed left circumflex with SHEILA 3 flow, normal LAD and RCA but an occluded distal small D1, which compared to prior angiogram, supplies a small arborous vessel not amenable to intervention   -- continue heparin gtt x 48 hours then DAPT for at least 12 months  -- Dr. Allan following     Stable abdominal aortic aneurysm  - continue heparin drip.  - Daily aspirin and statin.  - Radiology read from CTA chest states this is stable, she is monitored as outpatient for this.     Remote history of atrial fibrillation  - She  states this occurred twice, both approximately 90 minutes in duration, no active treatment for this at this time.  - Continue telemetry.    Expected Discharge Location and Transportation: home  Expected Discharge   Expected discharge date/ time has not been documented.     DVT prophylaxis:  Medical DVT prophylaxis orders are present.         AM-PAC 6 Clicks Score (PT): 24 (01/31/24 0016)    CODE STATUS:   Code Status and Medical Interventions:   Ordered at: 01/30/24 1324     Level Of Support Discussed With:    Patient     Code Status (Patient has no pulse and is not breathing):    CPR (Attempt to Resuscitate)     Medical Interventions (Patient has pulse or is breathing):    Full Support       Nereida Hu, DO  01/31/24

## 2024-01-31 NOTE — ED NOTES
" Carolyn Chaparro    Nursing Report ED to Floor:  Mental status: alert and oriented  Ambulatory status: independent  Oxygen Therapy:  room air  Cardiac Rhythm: normal sinus   Admitted from: ed  Safety Concerns:  none noted  Social Issues: none noted  ED Room #:  03    ED Nurse Phone Extension - [phone] or may call [phone].      HPI:   Chief Complaint   Patient presents with    Chest Pain       Past Medical History:  Past Medical History:   Diagnosis Date    Anxiety     Arrhythmia afib after covid    last time was 5/24/23    Arthritis 2011    No problems now.    Atrial fibrillation 05/14/2023    stopped after 90 min. went to ER    Chronic constipation     GERD (gastroesophageal reflux disease)     Headache     Heart palpitations     Hypertension     Myocardial infarction 10/01/18    Spontaneous dissection    CJ (obstructive sleep apnea) 09/18/2023    Osteopenia     Post-menopause on HRT (hormone replacement therapy)     not currently    PVCs (premature ventricular contractions)     hx    Spontaneous dissection of coronary artery     \"attempted stent, but heart re-routed itself\"         Past Surgical History:  Past Surgical History:   Procedure Laterality Date    CARDIAC CATHETERIZATION N/A 10/02/2018    Procedure: Left Heart Cath;  Surgeon: Hellen Allan MD;  Location:  [facility];  Service: Cardiology    COLONOSCOPY      LAPAROSCOPIC ASSISTED VAGINAL HYSTERECTOMY SALPINGO OOPHORECTOMY Bilateral     LAPAROSCOPY WITH LASER      NASAL POLYP EXCISION  2022    benign-caused nose bleeds    PERINEOPLASTY      POSTERIOR REPAIR      SACROCOLPOPEXY N/A 05/13/2021    Procedure: SACROCOLPOPEXY LAPAROSCOPIC WITH ROBOTIC WITH MID URETHRAL SLING  CYSTOSCOPY POSTERIOR REPAIR;  Surgeon: Thelma Mortensen MD;  Location: [facility] OR;  Service: Robotics - DaVinci;  Laterality: N/A;    WISDOM TOOTH EXTRACTION          Admitting Doctor:   Meet Leroy III,     Consulting Provider(s):  Consults  "      Date and Time Order Name Status Description    1/30/2024 11:17 PM Inpatient Cardiology Consult               Admitting Diagnosis:   The primary encounter diagnosis was NSTEMI (non-ST elevated myocardial infarction). Diagnoses of Aneurysm of ascending aorta without rupture and Chest pain, unspecified type were also pertinent to this visit.    Most Recent Vitals:   Vitals:    01/30/24 2306 01/30/24 2311 01/30/24 2316 01/30/24 2321   BP:       BP Location:       Patient Position:       Pulse: 65 60 58 58   Resp:       Temp:       TempSrc:       SpO2: 98% 94% 95% 96%   Weight:       Height:           Active LDAs/IV Access:   Lines, Drains & Airways       Active LDAs       Name Placement date Placement time Site Days    Peripheral IV 01/30/24 1901 Right Antecubital 01/30/24 1901  Antecubital  less than 1    Peripheral IV 01/30/24 2229 Left Antecubital 01/30/24 2229  Antecubital  less than 1                    Labs (abnormal labs have a star):   Labs Reviewed   TROPONIN - Abnormal; Notable for the following components:       Result Value    HS Troponin T 17 (*)     All other components within normal limits    Narrative:     High Sensitive Troponin T Reference Range:  <14.0 ng/L- Negative Female for AMI  <22.0 ng/L- Negative Male for AMI  >=14 - Abnormal Female indicating possible myocardial injury.  >=22 - Abnormal Male indicating possible myocardial injury.   Clinicians would have to utilize clinical acumen, EKG, Troponin, and serial changes to determine if it is an Acute Myocardial Infarction or myocardial injury due to an underlying chronic condition.        CBC WITH AUTO DIFFERENTIAL - Abnormal; Notable for the following components:    RDW 12.1 (*)     All other components within normal limits   HIGH SENSITIVITIY TROPONIN T 2HR - Abnormal; Notable for the following components:    HS Troponin T 110 (*)     Troponin T Delta 93 (*)     All other components within normal limits    Narrative:     High Sensitive  Troponin T Reference Range:  <14.0 ng/L- Negative Female for AMI  <22.0 ng/L- Negative Male for AMI  >=14 - Abnormal Female indicating possible myocardial injury.  >=22 - Abnormal Male indicating possible myocardial injury.   Clinicians would have to utilize clinical acumen, EKG, Troponin, and serial changes to determine if it is an Acute Myocardial Infarction or myocardial injury due to an underlying chronic condition.        LIPASE - Normal   BNP (IN-HOUSE) - Normal    Narrative:     This assay is used as an aid in the diagnosis of individuals suspected of having heart failure. It can be used as an aid in the diagnosis of acute decompensated heart failure (ADHF) in patients presenting with signs and symptoms of ADHF to the emergency department (ED). In addition, NT-proBNP of <300 pg/mL indicates ADHF is not likely.    Age Range Result Interpretation  NT-proBNP Concentration (pg/mL:      <50             Positive            >450                   Gray                 300-450                    Negative             <300    50-75           Positive            >900                  Gray                300-900                  Negative            <300      >75             Positive            >1800                  Gray                300-1800                  Negative            <300   HEPARIN ANTI XA - Normal   PROTIME-INR - Normal   APTT - Normal    Narrative:     PTT = The equivalent PTT values for the therapeutic range of heparin levels at 0.3 to 0.5 U/ml are 60 to 70 seconds.   TSH RFX ON ABNORMAL TO FREE T4 - Normal   RAINBOW DRAW    Narrative:     The following orders were created for panel order [facility] Draw.  Procedure                               Abnormality         Status                     ---------                               -----------         ------                     Green Top (Gel)[[id number]]                                  Final result               Lavender Top[[id number]]                                      Final result               Gold Top - [facility][[id number]]                                   Final result               Aguilar Top[[id number]]                                         Final result               Light Blue Top[[id number]]                                   Final result                 Please view results for these tests on the individual orders.   COMPREHENSIVE METABOLIC PANEL    Narrative:     GFR Normal >60  Chronic Kidney Disease <60  Kidney Failure <15     CBC WITH AUTO DIFFERENTIAL   CBC WITH AUTO DIFFERENTIAL   COMPREHENSIVE METABOLIC PANEL   MAGNESIUM   LIPID PANEL   HEMOGLOBIN A1C   HEPARIN ANTI XA   TROPONIN   CBC AND DIFFERENTIAL    Narrative:     The following orders were created for panel order CBC & Differential.  Procedure                               Abnormality         Status                     ---------                               -----------         ------                     CBC Auto Differential[[id number]]        Abnormal            Final result                 Please view results for these tests on the individual orders.   GREEN TOP   LAVENDER TOP   GOLD [facility] - SST   GRAY TOP   LIGHT BLUE TOP   CBC AND DIFFERENTIAL    Narrative:     The following orders were created for panel order CBC & Differential.  Procedure                               Abnormality         Status                     ---------                               -----------         ------                     CBC Auto Differential[[id number]]                                                         Please view results for these tests on the individual orders.       Meds Given in ED:   Medications   sodium chloride 0.9 % flush 10 mL (has no administration in time range)   nitroglycerin (TRIDIL) 200 mcg/ml infusion (20 mcg/min Intravenous Rate/Dose Change 1/30/24 5372)   heparin 98940 units/250 mL (100 units/mL) in 0.45 % NaCl infusion (12 Units/kg/hr × 79.4 kg Intravenous New Bag 1/30/24 2229)   Pharmacy to Dose  Heparin (has no administration in time range)   Pharmacy Consult - MTM (has no administration in time range)   sodium chloride 0.9 % flush 10 mL (has no administration in time range)   sodium chloride 0.9 % flush 10 mL (has no administration in time range)   sodium chloride 0.9 % infusion 40 mL (has no administration in time range)   nitroglycerin (NITROSTAT) SL tablet 0.4 mg (has no administration in time range)   Potassium Replacement - Follow Nurse / BPA Driven Protocol (has no administration in time range)   Magnesium Standard Dose Replacement - Follow Nurse / BPA Driven Protocol (has no administration in time range)   Phosphorus Replacement - Follow Nurse / BPA Driven Protocol (has no administration in time range)   Calcium Replacement - Follow Nurse / BPA Driven Protocol (has no administration in time range)   acetaminophen (TYLENOL) tablet 650 mg (has no administration in time range)   HYDROcodone-acetaminophen (NORCO) 5-325 MG per tablet 1 tablet (has no administration in time range)   morphine injection 1 mg (has no administration in time range)     And   naloxone (NARCAN) injection 0.4 mg (has no administration in time range)   melatonin tablet 5 mg (has no administration in time range)   ondansetron (ZOFRAN) injection 4 mg (has no administration in time range)   aspirin chewable tablet 324 mg (324 mg Oral Given 1/30/24 1742)   iopamidol (ISOVUE-370) 76 % injection 100 mL (90 mL Intravenous Given 1/30/24 2136)   clopidogrel (PLAVIX) tablet 600 mg (600 mg Oral Given 1/30/24 2216)   heparin (porcine) injection 4,000 Units (4,000 Units Intravenous Given 1/30/24 2229)     heparin, 12 Units/kg/hr, Last Rate: 12 Units/kg/hr (01/30/24 2229)  nitroglycerin, 10-50 mcg/min, Last Rate: 20 mcg/min (01/30/24 2309)  Pharmacy to Dose Heparin,

## 2024-01-31 NOTE — PLAN OF CARE
Problem: Obstructive Sleep Apnea Risk or Actual Comorbidity Management  Goal: Unobstructed Breathing During Sleep  Outcome: Ongoing, Progressing     Problem: Adult Inpatient Plan of Care  Goal: Plan of Care Review  Outcome: Ongoing, Progressing  Flowsheets (Taken 1/31/2024 0533)  Progress: no change  Plan of Care Reviewed With: patient  Outcome Evaluation:   Pt admitted from ER on heparin gtt and nitro gtt. Nitro gtt titrated for chest pain. PRN pain medication given   effective per pt. VSS. No acute events during shift.  Goal: Patient-Specific Goal (Individualized)  Outcome: Ongoing, Progressing  Goal: Absence of Hospital-Acquired Illness or Injury  Outcome: Ongoing, Progressing  Intervention: Identify and Manage Fall Risk  Recent Flowsheet Documentation  Taken 1/31/2024 0400 by Xochitl Keller RN  Safety Promotion/Fall Prevention:   assistive device/personal items within reach   clutter free environment maintained   nonskid shoes/slippers when out of bed   room organization consistent   safety round/check completed  Taken 1/31/2024 0230 by Xochitl Keller RN  Safety Promotion/Fall Prevention:   assistive device/personal items within reach   clutter free environment maintained   nonskid shoes/slippers when out of bed   room organization consistent   safety round/check completed  Taken 1/31/2024 0020 by Xochitl Keller RN  Safety Promotion/Fall Prevention:   assistive device/personal items within reach   clutter free environment maintained   nonskid shoes/slippers when out of bed   room organization consistent   safety round/check completed  Intervention: Prevent Skin Injury  Recent Flowsheet Documentation  Taken 1/31/2024 0400 by Xochitl Keller RN  Body Position: position changed independently  Skin Protection:   adhesive use limited   transparent dressing maintained   tubing/devices free from skin contact  Taken 1/31/2024 0230 by Xochitl Keller RN  Body Position: position changed  independently  Skin Protection:   adhesive use limited   transparent dressing maintained   tubing/devices free from skin contact  Taken 1/31/2024 0020 by Xochitl Keller RN  Body Position: position changed independently  Intervention: Prevent and Manage VTE (Venous Thromboembolism) Risk  Recent Flowsheet Documentation  Taken 1/31/2024 0400 by Xochitl Keller RN  Activity Management:   up ad angelica   activity encouraged  Taken 1/31/2024 0230 by Xochitl Keller RN  Activity Management:   up ad angelica   activity encouraged  Taken 1/31/2024 0020 by Xochitl Keller RN  Activity Management:   up ad angelica   activity encouraged  VTE Prevention/Management:   bilateral   sequential compression devices off  Intervention: Prevent Infection  Recent Flowsheet Documentation  Taken 1/31/2024 0400 by Xochitl Keller RN  Infection Prevention:   environmental surveillance performed   hand hygiene promoted   rest/sleep promoted  Taken 1/31/2024 0230 by Xochitl Keller RN  Infection Prevention:   environmental surveillance performed   hand hygiene promoted   rest/sleep promoted  Goal: Optimal Comfort and Wellbeing  Outcome: Ongoing, Progressing  Intervention: Monitor Pain and Promote Comfort  Recent Flowsheet Documentation  Taken 1/31/2024 0120 by Xochitl Keller RN  Pain Management Interventions: see MAR  Taken 1/31/2024 0020 by Xochitl Keller RN  Pain Management Interventions: see MAR  Intervention: Provide Person-Centered Care  Recent Flowsheet Documentation  Taken 1/31/2024 0020 by Xochitl Keller RN  Trust Relationship/Rapport:   care explained   choices provided   thoughts/feelings acknowledged  Goal: Readiness for Transition of Care  Outcome: Ongoing, Progressing  Intervention: Mutually Develop Transition Plan  Recent Flowsheet Documentation  Taken 1/31/2024 0019 by Xochitl Keller RN  Equipment Currently Used at Home: cpap  Taken 1/31/2024 0018 by Xochitl Keller RN  Transportation  Anticipated: family or friend will provide  Patient/Family Anticipated Services at Transition: none  Patient/Family Anticipates Transition to: home with family     Problem: Chest Pain  Goal: Resolution of Chest Pain Symptoms  Outcome: Ongoing, Progressing     Problem: Pain Acute  Goal: Acceptable Pain Control and Functional Ability  Outcome: Ongoing, Progressing  Intervention: Prevent or Manage Pain  Recent Flowsheet Documentation  Taken 1/31/2024 0020 by Xochitl Keller RN  Medication Review/Management: medications reviewed  Intervention: Develop Pain Management Plan  Recent Flowsheet Documentation  Taken 1/31/2024 0120 by Xochitl Keller RN  Pain Management Interventions: see MAR  Taken 1/31/2024 0020 by Xochitl Keller RN  Pain Management Interventions: see MAR  Intervention: Optimize Psychosocial Wellbeing  Recent Flowsheet Documentation  Taken 1/31/2024 0020 by Xochitl Keller, RN  Diversional Activities:   television   smartphone   Goal Outcome Evaluation:  Plan of Care Reviewed With: patient        Progress: no change  Outcome Evaluation: Pt admitted from ER on heparin gtt and nitro gtt. Nitro gtt titrated for chest pain. PRN pain medication given; effective per pt. VSS. No acute events during shift.

## 2024-01-31 NOTE — PROGRESS NOTES
Pt. qualifies for Phase II Cardiac Rehab. Staff discussed benefits of exercise, program protocol, and educational material provided. Teach back verified.  Patient scheduled for orientation at [facility] on Wednesday, February 21st 2024 at 1.00pm.

## 2024-01-31 NOTE — CASE MANAGEMENT/SOCIAL WORK
Discharge Planning Assessment  [facility]     Patient Name: Carolyn Chaparro  MRN: [id number]  Today's Date: 1/31/2024    Admit Date: 1/30/2024    Plan: Home with spouse   Discharge Needs Assessment       Row Name 01/31/24 0831       Living Environment    People in Home spouse    Name(s) of People in Home Daniel (spouse) [phone]    Current Living Arrangements home    Potentially Unsafe Housing Conditions none    Primary Care Provided by self    Provides Primary Care For no one    Family Caregiver if Needed spouse    Able to Return to Prior Arrangements yes       Resource/Environmental Concerns    Resource/Environmental Concerns none    Transportation Concerns none       Transition Planning    Patient/Family Anticipates Transition to home with family    Patient/Family Anticipated Services at Transition none    Transportation Anticipated family or friend will provide       Discharge Needs Assessment    Readmission Within the Last 30 Days no previous admission in last 30 days    Equipment Currently Used at Home cpap    Concerns to be Addressed denies needs/concerns at this time    Anticipated Changes Related to Illness none    Equipment Needed After Discharge none                   Discharge Plan       Row Name 01/31/24 0832       Plan    Plan Home with spouse    Patient/Family in Agreement with Plan yes    Plan Comments Spoke to patient at bedside. Lives with Daniel (spouse) [phone] in [place]. Is independent with ADL's. No problems with [employer] or medications. Uses a CPAP at night. Has advanced directives. PCP is ANDREW Rivas. Plan is home with spouse. Spouse will transport. CM will continue to follow.    Final Discharge Disposition Code 01 - home or self-care                  Continued Care and Services - Admitted Since 1/30/2024    Coordination has not been started for this encounter.          Demographic Summary       Row Name 01/31/24 0831       General Information    Admission Type  observation    Arrived From emergency department    Referral Source admission list    Reason for Consult discharge planning    Preferred Language English       Contact Information    Permission Granted to Share Info With     Contact Information Obtained for                    Functional Status       Row Name 01/31/24 0831       Functional Status    Usual Activity Tolerance good    Current Activity Tolerance good       Functional Status, IADL    Medications independent    Meal Preparation independent    Housekeeping independent    Laundry independent    Shopping independent       Mental Status    General Appearance WDL WDL       Mental Status Summary    Recent Changes in Mental Status/Cognitive Functioning no changes       Employment/    Employment Status employed full-time                   Psychosocial    No documentation.                  Abuse/Neglect    No documentation.                  Legal    No documentation.                  Substance Abuse    No documentation.                  Patient Forms    No documentation.                     Russel Hutchinson RN

## 2024-02-01 ENCOUNTER — READMISSION MANAGEMENT (OUTPATIENT)
Dept: CALL CENTER | Facility: HOSPITAL | Age: 64
End: 2024-02-01
Payer: COMMERCIAL

## 2024-02-01 VITALS
HEIGHT: 69 IN | HEART RATE: 60 BPM | WEIGHT: 175.71 LBS | TEMPERATURE: 97.9 F | SYSTOLIC BLOOD PRESSURE: 125 MMHG | BODY MASS INDEX: 26.02 KG/M2 | DIASTOLIC BLOOD PRESSURE: 60 MMHG | RESPIRATION RATE: 16 BRPM | OXYGEN SATURATION: 99 %

## 2024-02-01 LAB
ALBUMIN SERPL-MCNC: 4.2 G/DL (ref 3.5–5.2)
ALBUMIN/GLOB SERPL: 1.8 G/DL
ALP SERPL-CCNC: 59 U/L (ref 39–117)
ALT SERPL W P-5'-P-CCNC: 24 U/L (ref 1–33)
ANION GAP SERPL CALCULATED.3IONS-SCNC: 10 MMOL/L (ref 5–15)
AST SERPL-CCNC: 58 U/L (ref 1–32)
BILIRUB SERPL-MCNC: 0.4 MG/DL (ref 0–1.2)
BUN SERPL-MCNC: 11 MG/DL (ref 8–23)
BUN/CREAT SERPL: 18.3 (ref 7–25)
CALCIUM SPEC-SCNC: 8.9 MG/DL (ref 8.6–10.5)
CHLORIDE SERPL-SCNC: 105 MMOL/L (ref 98–107)
CO2 SERPL-SCNC: 25 MMOL/L (ref 22–29)
CREAT SERPL-MCNC: 0.6 MG/DL (ref 0.57–1)
DEPRECATED RDW RBC AUTO: 39 FL (ref 37–54)
EGFRCR SERPLBLD CKD-EPI 2021: 102.3 ML/MIN/1.73
ERYTHROCYTE [DISTWIDTH] IN BLOOD BY AUTOMATED COUNT: 12 % (ref 12.3–15.4)
GLOBULIN UR ELPH-MCNC: 2.4 GM/DL
GLUCOSE SERPL-MCNC: 125 MG/DL (ref 65–99)
HCT VFR BLD AUTO: 39 % (ref 34–46.6)
HGB BLD-MCNC: 13.6 G/DL (ref 12–15.9)
MCH RBC QN AUTO: 30.7 PG (ref 26.6–33)
MCHC RBC AUTO-ENTMCNC: 34.9 G/DL (ref 31.5–35.7)
MCV RBC AUTO: 88 FL (ref 79–97)
PLATELET # BLD AUTO: 199 10*3/MM3 (ref 140–450)
PMV BLD AUTO: 9.6 FL (ref 6–12)
POTASSIUM SERPL-SCNC: 4 MMOL/L (ref 3.5–5.2)
PROT SERPL-MCNC: 6.6 G/DL (ref 6–8.5)
RBC # BLD AUTO: 4.43 10*6/MM3 (ref 3.77–5.28)
SODIUM SERPL-SCNC: 140 MMOL/L (ref 136–145)
UFH PPP CHRO-ACNC: 0.56 IU/ML (ref 0.3–0.7)
WBC NRBC COR # BLD AUTO: 6.97 10*3/MM3 (ref 3.4–10.8)

## 2024-02-01 PROCEDURE — 85027 COMPLETE CBC AUTOMATED: CPT | Performed by: HOSPITALIST

## 2024-02-01 PROCEDURE — 99239 HOSP IP/OBS DSCHRG MGMT >30: CPT | Performed by: HOSPITALIST

## 2024-02-01 PROCEDURE — 94761 N-INVAS EAR/PLS OXIMETRY MLT: CPT

## 2024-02-01 PROCEDURE — 85520 HEPARIN ASSAY: CPT

## 2024-02-01 PROCEDURE — 80053 COMPREHEN METABOLIC PANEL: CPT | Performed by: HOSPITALIST

## 2024-02-01 PROCEDURE — 99232 SBSQ HOSP IP/OBS MODERATE 35: CPT | Performed by: INTERNAL MEDICINE

## 2024-02-01 RX ORDER — CLOPIDOGREL BISULFATE 75 MG/1
75 TABLET ORAL DAILY
Qty: 90 TABLET | Refills: 0 | Status: SHIPPED | OUTPATIENT
Start: 2024-02-02

## 2024-02-01 RX ORDER — ATORVASTATIN CALCIUM 80 MG/1
80 TABLET, FILM COATED ORAL DAILY
Qty: 90 TABLET | Refills: 0 | Status: SHIPPED | OUTPATIENT
Start: 2024-02-01

## 2024-02-01 RX ADMIN — ASPIRIN 81 MG: 81 TABLET, COATED ORAL at 09:24

## 2024-02-01 RX ADMIN — PANTOPRAZOLE SODIUM 40 MG: 40 TABLET, DELAYED RELEASE ORAL at 06:26

## 2024-02-01 RX ADMIN — POLYETHYLENE GLYCOL 3350 17 G: 17 POWDER, FOR SOLUTION ORAL at 09:24

## 2024-02-01 RX ADMIN — CLOPIDOGREL BISULFATE 75 MG: 75 TABLET ORAL at 09:24

## 2024-02-01 RX ADMIN — Medication 10 ML: at 09:30

## 2024-02-01 RX ADMIN — METOPROLOL SUCCINATE 50 MG: 50 TABLET, EXTENDED RELEASE ORAL at 09:26

## 2024-02-01 NOTE — PROGRESS NOTES
"[facility] Cardiology Daily Note       LOS: 1 day   Patient Care Team:  Erika Hernandez PA as PCP - General (Physician Assistant)  Reed Scanlon MD (Inactive) as Consulting Physician (Gynecology)  Hellen Allan MD as Consulting Physician (Cardiology)    Chief Complaint: Non-ST elevation MI    Subjective     Subjective: No further chest pain.  States this dissection hurt worse than the first one.  Heart rates in the 60s.  99% 2 L nasal cannula.  Blood pressure been good overnight.    Review of Systems:   As above.    Medications:  aspirin, 81 mg, Oral, Daily  clopidogrel, 75 mg, Oral, Daily  metoprolol succinate XL, 50 mg, Oral, BID  pantoprazole, 40 mg, Oral, Daily  pharmacy consult - MTM, , Does not apply, Daily  polyethylene glycol, 17 g, Oral, Daily With Breakfast  rosuvastatin, 5 mg, Oral, Nightly  sodium chloride, 10 mL, Intravenous, Q12H  terazosin, 2 mg, Oral, Nightly        Objective     Vital Sign Min/Max for last 24 hours  Temp  Min: 97.9 °F (36.6 °C)  Max: 98.5 °F (36.9 °C)   BP  Min: 112/61  Max: 148/84   Pulse  Min: 46  Max: 67   Resp  Min: 14  Max: 16   SpO2  Min: 92 %  Max: 99 %   Flow (L/min)  Min: 2  Max: 2   Weight  Min: 79.7 kg (175 lb 11.3 oz)  Max: 79.7 kg (175 lb 11.3 oz)      Intake/Output Summary (Last 24 hours) at 2/1/2024 0834  Last data filed at 1/31/2024 1805  Gross per 24 hour   Intake 592.03 ml   Output 800 ml   Net -207.97 ml        Flowsheet Rows      Flowsheet Row First Filed Value   Admission Height 175.3 cm (69\") Documented at 01/30/2024 1726   Admission Weight 79.4 kg (175 lb) Documented at 01/30/2024 1726            Physical Exam:    General: Alert and oriented.   Cardiovascular: Heart has a nondisplaced focal PMI. Regular rate and rhythm without murmur, gallop or rub.  Lungs: Clear without rales or wheezes. Equal expansion is noted.   Abdomen: Soft, nontender.  Extremities: Show no edema. Good right radial pulse  Skin: warm and " "dry.     Results Review:    I reviewed the patient's new clinical results.  EKG:  Tele: Sinus rhythm    Labs:    Results from last 7 days   Lab Units 01/31/24  0446 01/30/24  1739   SODIUM mmol/L 133* 136   POTASSIUM mmol/L 3.7 3.8   CHLORIDE mmol/L 99 99   CO2 mmol/L 22.0 28.0   BUN mg/dL 14 16   CREATININE mg/dL 0.62 0.77   CALCIUM mg/dL 8.7 9.2   BILIRUBIN mg/dL 0.4 0.3   ALK PHOS U/L 59 72   ALT (SGPT) U/L 23 24   AST (SGOT) U/L 50* 29   GLUCOSE mg/dL 136* 83     Results from last 7 days   Lab Units 02/01/24  0748 01/31/24  0446 01/30/24  1739   WBC 10*3/mm3 6.97 8.25 6.79   HEMOGLOBIN g/dL 13.6 12.8 14.0   HEMATOCRIT % 39.0 36.2 40.4   PLATELETS 10*3/mm3 199 201 242     Lab Results   Component Value Date    TROPONINI 49.814 (C) 10/03/2018    TROPONINI 9.864 (C) 10/02/2018    TROPONINT 467 (C) 01/31/2024    TROPONINT 110 (C) 01/30/2024    TROPONINT 17 (H) 01/30/2024     Lab Results   Component Value Date    CHOL 123 01/31/2024    CHOL 124 03/23/2022    CHOL 126 02/09/2021     Lab Results   Component Value Date    TRIG 56 01/31/2024    TRIG 97 03/23/2022    TRIG 74 02/09/2021     Lab Results   Component Value Date    HDL 44 01/31/2024    HDL 42 03/23/2022    HDL 47 02/09/2021     No components found for: \"LDLCALC\"  Lab Results   Component Value Date    INR 0.98 01/30/2024    INR 0.97 05/14/2023    PROTIME 13.1 01/30/2024    PROTIME 13.0 05/14/2023         Ejection Fraction: EF 60%    Assessment   Assessment:    NSTEMI  Patient on heparin and nitroglycerin drip  Elevated troponins without EKG changes.  Small diagonal occlusion felt secondary to SCAD noted at cath 1/31/2024  Already on beta-blockade, aspirin and statin.  History of spontaneous circumflex coronary artery dissection  Hypertension  Hyperlipidemia  Remote history of atrial fibrillation  She reports 2 episodes that are approximately 90 minutes in duration with no treatment    Plan:    Discontinue heparin  Continue Plavix for total of 3 months  Continue " aspirin and statin and beta-blocker  Anticipate home later this afternoon.    Hellen Allan MD  02/01/24  08:34 EST

## 2024-02-01 NOTE — DISCHARGE SUMMARY
[facility] Medicine Services  DISCHARGE SUMMARY    Patient Name: Carolyn Chaparro  : 1960  MRN: [id number]    Date of Admission: 2024  8:13 PM  Date of Discharge:  24  Primary Care Physician: Erika Hernandez PA    Consults       Date and Time Order Name Status Description    2024 11:17 PM Inpatient Cardiology Consult Completed             Hospital Course     Presenting Problem: chest pain    Active Hospital Problems    Diagnosis  POA    **NSTEMI (non-ST elevated myocardial infarction) [I21.4]  Yes      Resolved Hospital Problems   No resolved problems to display.          Hospital Course:  Carolyn Chaparro is a 63 y.o. female is a 63 y.o. female that presented to the ED complaining of chest pain and tightness that began prior to arrival in the ED on 24. Troponin continued to trend up and patient was taken for a left heart cath on 24.     This patient's problems and plans were partially entered by my partner and updated as appropriate by me 24.      Non-STEMI  History of spontaneous circumflex coronary artery dissection  Hypertension  Hyperlipidemia  - Continue aspirin, increased to high dose statin, metoprolol from home regimen.  - s/p heparin gtt  - LHC per Dr. Grewal 24:  well healed left circumflex with SHEILA 3 flow, normal LAD and RCA but an occluded distal small D1, which compared to prior angiogram, supplies a small arborous vessel not amenable to intervention   -- Dr. Allan followed; f/u in 4-6 weeks outpatient     Stable abdominal aortic aneurysm  - s/p heparin drip.  - Daily aspirin and statin.  - Radiology read from CTA chest states this is stable, she is monitored as outpatient for this.     Remote history of atrial fibrillation  - She states this occurred twice, both approximately 90 minutes in duration, no active treatment for this at this time.  - no evidence of atrial fibrillation on telemetry during  this admission      Discharge Follow Up Recommendations for outpatient labs/diagnostics:  - increase statin to Atorvastatin 80mg (high dose statin)  - continue aspirin 81mg PO daily  - Plavix 75mg PO daily x 3 months  - f/u with Dr. Allan in 4-6 weeks    Day of Discharge     HPI:   Patient stable, no further chest pain.    Review of Systems  Gen- No fevers, chills  CV- No chest pain, palpitations  Resp- No cough, dyspnea  GI- No N/V/D, abd pain    Vital Signs:   Temp:  [97.9 °F (36.6 °C)-99.1 °F (37.3 °C)] 97.9 °F (36.6 °C)  Heart Rate:  [51-66] (P) 53  Resp:  [15-16] (P) 15  BP: (112-148)/(60-84) 125/60  Flow (L/min):  [2] 2      Physical Exam:  Constitutional: No acute distress, awake, alert  HENT: NCAT, mucous membranes moist  Respiratory: Clear to auscultation bilaterally, respiratory effort normal   Cardiovascular: RRR, no murmurs, rubs, or gallops  Gastrointestinal: Positive bowel sounds, soft, nontender, nondistended  Musculoskeletal: No bilateral ankle edema  Psychiatric: Appropriate affect, cooperative  Neurologic: Oriented x 3, strength symmetric in all extremities, Cranial Nerves grossly intact to confrontation, speech clear  Skin: No rashes      Pertinent  and/or Most Recent Results     LAB RESULTS:      Lab 02/01/24  0748 01/31/24  2158 01/31/24  1430 01/31/24  0446 01/30/24  2238 01/30/24  1739   WBC 6.97  --   --  8.25  --  6.79   HEMOGLOBIN 13.6  --   --  12.8  --  14.0   HEMATOCRIT 39.0  --   --  36.2  --  40.4   PLATELETS 199  --   --  201  --  242   NEUTROS ABS  --   --   --  6.22  --  3.63   IMMATURE GRANS (ABS)  --   --   --  0.03  --  0.01   LYMPHS ABS  --   --   --  1.25  --  2.00   MONOS ABS  --   --   --  0.60  --  0.77   EOS ABS  --   --   --  0.09  --  0.32   MCV 88.0  --   --  87.9  --  91.2   PROTIME  --   --   --   --   --  13.1   APTT  --   --   --   --  77.5  --    HEPARIN ANTI-XA 0.56 0.23* 0.10* 0.18* 0.39  --          Lab 02/01/24  0748 01/31/24  0446 01/30/24  2044  01/30/24  1739   SODIUM 140 133*  --  136   POTASSIUM 4.0 3.7  --  3.8   CHLORIDE 105 99  --  99   CO2 25.0 22.0  --  28.0   ANION GAP 10.0 12.0  --  9.0   BUN 11 14  --  16   CREATININE 0.60 0.62  --  0.77   EGFR 102.3 101.5  --  87.9   GLUCOSE 125* 136*  --  83   CALCIUM 8.9 8.7  --  9.2   MAGNESIUM  --  2.0  --   --    HEMOGLOBIN A1C  --  4.90  --   --    TSH  --   --  2.450  --          Lab 02/01/24  0748 01/31/24  0446 01/30/24  1739   TOTAL PROTEIN 6.6 6.3 6.9   ALBUMIN 4.2 4.1 4.7   GLOBULIN 2.4 2.2 2.2   ALT (SGPT) 24 23 24   AST (SGOT) 58* 50* 29   BILIRUBIN 0.4 0.4 0.3   ALK PHOS 59 59 72   LIPASE  --   --  25         Lab 01/31/24  0446 01/30/24 2044 01/30/24  1739   PROBNP  --   --  215.0   HSTROP T 467* 110* 17*   PROTIME  --   --  13.1   INR  --   --  0.98         Lab 01/31/24 0446   CHOLESTEROL 123   LDL CHOL 67   HDL CHOL 44   TRIGLYCERIDES 56             Brief Urine Lab Results       None          Microbiology Results (last 10 days)       ** No results found for the last 240 hours. **            Adult Transthoracic Echo Complete W/ Cont if Necessary Per Protocol    Result Date: 1/31/2024    Left ventricular systolic function is normal. Estimated left ventricular EF = 60%   Left ventricular diastolic function was normal.   Trace mitral valve regurgitation is present.   Trace to mild tricuspid valve regurgitation is present.   Calculated right ventricular systolic pressure from tricuspid regurgitation is 24 mmHg.     Cardiac Catheterization/Vascular Study    Result Date: 1/31/2024    Occluded D1 in a small distal segment, possibly related to SCAD given her history   Improved left circumflex flow, healed from prior angiogram in 2018 (related to SCAD)   Otherwise minimal disease a diffusely tortuous right dominant system   Normal LVEDP of 12mmHg   Medical management of NSTEMI with heparin for 48hrs and DAPT for 12 months     CT Angiogram Chest    Result Date: 1/30/2024  CT ANGIOGRAM CHEST Date of Exam:  1/30/2024 9:28 PM EST Indication: CP into back, hx of coronary artery dissection. Comparison: 10/3/2018 Technique: CTA of the chest was performed after the uneventful intravenous administration of 90 mL Isovue-370. Reconstructed coronal and sagittal images were also obtained. In addition, a 3-D volume rendered image was created for interpretation. Automated exposure control and iterative reconstruction methods were used. FINDINGS: Scattered atelectasis is noted. No well-defined consolidations or pleural effusions are observed. A calcified granuloma is noted in the right lower lobe. No new suspicious pulmonary nodules or abnormal pulmonary masses are seen. There is aneurysmal dilatation of the ascending thoracic aorta measures 4.1 cm in transverse dimension. This finding is stable. There is no evidence for aortic dissection. Mild atherosclerotic changes are noted. There is a separate origin of the left vertebral artery at the aortic arch. No significant hilar, mediastinal, or axillary lymphadenopathy is observed. No significant coronary artery calcifications are identified. The thyroid gland is unremarkable. The esophagus is unremarkable. The limited evaluation of the upper abdomen demonstrates no evidence for acute abnormality. No acute osseous abnormalities are observed.     1.No evidence for acute intrathoracic abnormality. 2.Stable ascending thoracic aortic aneurysm measuring 4.1 cm in transverse dimension. Electronically Signed: Mayco Duran MD  1/30/2024 9:56 PM EST  Workstation ID: [id number]    XR Chest 1 View    Result Date: 1/30/2024  XR CHEST 1 VW Date of Exam: 1/30/2024 6:08 PM EST Indication: Chest Pain Triage Protocol Comparison: 5/14/2023. FINDINGS: No focal airspace opacity. No pleural effusion or pneumothorax. Normal heart and mediastinal contours.     No evidence of acute disease in the chest. Electronically Signed: Herbert Griffin MD  1/30/2024 7:09 PM EST  Workstation ID: [id number]              Results for orders placed during the hospital encounter of 01/30/24    Adult Transthoracic Echo Complete W/ Cont if Necessary Per Protocol    Interpretation Summary    Left ventricular systolic function is normal. Estimated left ventricular EF = 60%    Left ventricular diastolic function was normal.    Trace mitral valve regurgitation is present.    Trace to mild tricuspid valve regurgitation is present.    Calculated right ventricular systolic pressure from tricuspid regurgitation is 24 mmHg.      Plan for Follow-up of Pending Labs/Results: none    Discharge Details        Discharge Medications        New Medications        Instructions Start Date   atorvastatin 80 MG tablet  Commonly known as: LIPITOR   80 mg, Oral, Daily      clopidogrel 75 MG tablet  Commonly known as: PLAVIX   75 mg, Oral, Daily   Start Date: February 2, 2024            Continue These Medications        Instructions Start Date   aspirin 81 MG EC tablet   81 mg, Oral, Daily      calcium carbonate 600 MG tablet  Commonly known as: OS-MOIZ   600 mg, Oral, Daily      cetirizine 10 MG tablet  Commonly known as: zyrTEC   10 mg, Oral, Daily      cholecalciferol 25 MCG (1000 UT) tablet  Commonly known as: VITAMIN D3   1,000 Units, Oral, Daily      ibuprofen 200 MG tablet  Commonly known as: ADVIL,MOTRIN   400 mg, Oral, 2 Times Daily PRN      MAGNESIUM GLYCINATE PO   200 mg, Oral, Daily      metoprolol succinate XL 50 MG 24 hr tablet  Commonly known as: TOPROL-XL   Take 1/2 tablet by mouth in the morning and 1 tablet at night      multivitamin with minerals tablet tablet   1 tablet, Oral, Daily      pantoprazole 40 MG EC tablet  Commonly known as: PROTONIX   40 mg, Oral, Daily      PROBIOTIC ADVANCED PO   1 tablet, Oral, Daily      terazosin 2 MG capsule  Commonly known as: HYTRIN   2 mg, Oral, Nightly      vitamin B-12 1000 MCG tablet  Commonly known as: CYANOCOBALAMIN   1,000 mcg, Oral, Daily             Stop These Medications       rosuvastatin 5 MG tablet  Commonly known as: CRESTOR              Allergies   Allergen Reactions    Bisoprolol Other (See Comments)     Ineffective in controlling palps    Erythromycin Diarrhea         Discharge Disposition:  Home or Self Care    Diet:  Hospital:  Diet Order   Procedures    Diet: Cardiac Diets, Diabetic Diets; Healthy Heart (2-3 Na+); Consistent Carbohydrate; Texture: Regular Texture (IDDSI 7); Fluid Consistency: Thin (IDDSI 0)            Activity: as tolerated      Restrictions or Other Recommendations:  none       CODE STATUS:    Code Status and Medical Interventions:   Ordered at: 01/30/24 2222     Level Of Support Discussed With:    Patient     Code Status (Patient has no pulse and is not breathing):    CPR (Attempt to Resuscitate)     Medical Interventions (Patient has pulse or is breathing):    Full Support       Future Appointments   Date Time Provider Department Center   2/21/2024  1:00 PM OR[facility]ATION [facility]   3/6/2024  2:00 PM Hellen Allan MD [facility]   3/27/2024  8:15 AM Mack Velásquez APRN [facility]  [facility]       Additional Instructions for the Follow-ups that You Need to Schedule       Discharge Follow-up with Specified Provider: f/u with Dr. Allan in 4-6 weeks   As directed      To: f/u with Dr. Allan in 4-6 weeks                      Nereida Hu DO  02/01/24      Time Spent on Discharge:  I spent  35  minutes on this discharge activity which included: face-to-face encounter with the patient, reviewing the data in the system, coordination of the care with the nursing staff as well as consultants, documentation, and entering orders.

## 2024-02-01 NOTE — PROGRESS NOTES
HEPARIN INFUSION  Carolyn Chaparro is a  63 y.o. female receiving heparin infusion.     Therapy for (VTE/Cardiac): Cardiac  Patient Weight: 79.4 kg  Initial Bolus (Y/N): Yes  Any Bolus (Y/N): Yes  Signs or Symptoms of Bleeding: None noted per RN    Cardiac or Other (Not VTE)   Initial Bolus: 60 units/kg (Max 4,000 units)  Initial rate: 12 units/kg/hr (Max 1,000 units/hr)   Anti Xa Rebolus Infusion Hold time Change infusion Dose (Units/kg/hr) Next Anti Xa or aPTT Level Due   < 0.11 50 Units/kg  (4000 Units Max) None Increase by  3 Units/kg/hr 6 hours   0.11- 0.19 25 Units/kg  (2000 Units Max) None Increase by  2 Units/kg/hr 6 hours   0.2 - 0.29 0 None Increase by  1 Units/kg/hr 6 hours   0.3 - 0.5 0 None No Change 6 hours (after 2 consecutive levels in range check qAM)   0.51 - 0.6 0 None Decrease by  1 Units/kg/hr 6 hours   0.61 - 0.8 0 30 Minutes Decrease by  2 Units/kg/hr 6 hours   0.81 - 1 0 60 Minutes Decrease by  3 Units/kg/hr 6 hours   >1 0 Hold  After Anti Xa less than 0.5 decrease previous rate by  4 Units/kg/hr  Every 2 hours until Anti Xa  less than 0.5 then when infusion restarts in 6 hours     Results from last 7 days   Lab Units 01/31/24  0446 01/30/24  1739   INR   --  0.98   HEMOGLOBIN g/dL 12.8 14.0   HEMATOCRIT % 36.2 40.4   PLATELETS 10*3/mm3 201 242       Date   Time   Anti-Xa Current Rate (Unit/kg/hr) Bolus   (Units) Rate Change   (Unit/kg/hr) New Rate (Unit/kg/hr) Next   Anti-Xa Comments  Pump Check Daily   1/30 2142 0.39 New 4000 +12 12 0500 Lab draw 7 minutes after bolus    1/31 0446 0.18 12 2000 +2 14 1300 Discussed w/ nurse   1/31 1000 -- 14 -- Off for cath -- 2[address] [phone]   1/31 1611 -- HELD for procedure -- +14 14 2200 D/w RN, ok to restart s/p TR band   1/31 1670 0.23 14 -- +1 15 0500 D/w RN   2/1 0115 -- -- -- -- 15 0800 Nurse making change   2/1 0748 0.56 15 -- -1 14 1400 [address] [phone]

## 2024-02-02 NOTE — OUTREACH NOTE
Prep Survey      Flowsheet Row Responses   [facility] facility patient discharged from? [place]   Is LACE score < 7 ? No   Eligibility Readm Mgmt   Discharge diagnosis NSTEMI-left heart cath   Does the patient have one of the following disease processes/diagnoses(primary or secondary)? Acute MI (STEMI,NSTEMI)   Does the patient have Home health ordered? No   Is there a DME ordered? No   Prep survey completed? Yes            LUIS ALFREDO GOMEZ - Registered Nurse

## 2024-02-03 LAB
QT INTERVAL: 424 MS
QT INTERVAL: 460 MS
QTC INTERVAL: 397 MS
QTC INTERVAL: 430 MS

## 2024-02-06 ENCOUNTER — PATIENT MESSAGE (OUTPATIENT)
Dept: CARDIOLOGY | Facility: CLINIC | Age: 64
End: 2024-02-06
Payer: COMMERCIAL

## 2024-02-06 ENCOUNTER — READMISSION MANAGEMENT (OUTPATIENT)
Dept: CALL CENTER | Facility: HOSPITAL | Age: 64
End: 2024-02-06
Payer: COMMERCIAL

## 2024-02-06 NOTE — OUTREACH NOTE
AMI Week 1 Survey      Flowsheet Row Responses   [facility] patient discharged from? [place]   Does the patient have one of the following disease processes/diagnoses(primary or secondary)? Acute MI (STEMI,NSTEMI)   Week 1 attempt successful? Yes   Call start time 1245   Call end time 1250   Discharge diagnosis NSTEMI-left heart cath   Meds reviewed with patient/caregiver? Yes   Is the patient having any side effects they believe may be caused by any medication additions or changes? No   Does the patient have all prescriptions related to this admission filled (includes statins,anticoagulants,HTN meds,anti-arrhythmia meds) Yes   Is the patient taking all medications as directed (includes completed medication regime)? Yes   Does the patient have a primary care provider?  Yes   Does the patient have an appointment with their PCP,cardiologist,or clinic within 7 days of discharge? Greater than 7 days   What is preventing the patient from scheduling follow up appointments within 7 days of discharge? Earlier appointment not available   Comments Right radial LHC site bruising present but pt denies pain or other issues. Pt denies chest pain/pressure/tightness or other issues at this time.   Did the patient receive a copy of their discharge instructions? Yes   Nursing interventions Reviewed instructions with patient   What is the patient's perception of their health status since discharge? Returned to baseline/stable   Nursing interventions Nurse provided patient education   Is the patient/caregiver able to teach back signs and symptoms of when to call for help immediately: Sudden chest discomfort, Sudden discomfort in arms, back, neck or jaw, Nausea or vomiting, Shortness of breath at any time   Nursing interventions Nurse provided patient education   Is the patient/caregiver able to teach back lifestyle changes to help prevent MIs Heart healthy diet   Is the patient/caregiver able to teach back ways to prevent a  second heart attack: Take medications, Follow up with MD   Is the patient/caregiver able to teach back the hierarchy of who to call/visit for symptoms/problems? PCP, Specialist, Home health nurse, Urgent Care, ED, 911 Yes   Week 1 call completed? Yes   Revoked No further contact(revokes)-requires comment   Graduated/Revoked comments Pt has returned to work, she reports   Call end time 1250            Lily KRISHNAMURTHY - Registered Nurse

## 2024-02-14 ENCOUNTER — DOCUMENTATION (OUTPATIENT)
Dept: CARDIOLOGY | Facility: CLINIC | Age: 64
End: 2024-02-14
Payer: COMMERCIAL

## 2024-02-21 ENCOUNTER — PATIENT MESSAGE (OUTPATIENT)
Dept: CARDIOLOGY | Facility: CLINIC | Age: 64
End: 2024-02-21
Payer: COMMERCIAL

## 2024-02-21 ENCOUNTER — TREATMENT (OUTPATIENT)
Dept: CARDIAC REHAB | Facility: HOSPITAL | Age: 64
End: 2024-02-21
Payer: COMMERCIAL

## 2024-02-21 DIAGNOSIS — I21.4 NSTEMI, INITIAL EPISODE OF CARE: ICD-10-CM

## 2024-02-21 PROCEDURE — 93798 PHYS/QHP OP CAR RHAB W/ECG: CPT

## 2024-02-21 NOTE — PROGRESS NOTES
Attended Phase II Cardiac Rehab orientation. Medication health history changes recorded. See [facility] for details.

## 2024-02-22 ENCOUNTER — TREATMENT (OUTPATIENT)
Dept: CARDIAC REHAB | Facility: HOSPITAL | Age: 64
End: 2024-02-22
Payer: COMMERCIAL

## 2024-02-22 DIAGNOSIS — I21.4 NSTEMI, INITIAL EPISODE OF CARE: Primary | ICD-10-CM

## 2024-02-22 PROCEDURE — 93798 PHYS/QHP OP CAR RHAB W/ECG: CPT

## 2024-02-22 NOTE — PROGRESS NOTES
Attended Phase II Intro to Exercise class and Cardiac Rehab session. No medication or health history changes reported. See [facility] for details.

## 2024-02-23 ENCOUNTER — TREATMENT (OUTPATIENT)
Dept: CARDIAC REHAB | Facility: HOSPITAL | Age: 64
End: 2024-02-23
Payer: COMMERCIAL

## 2024-02-23 DIAGNOSIS — I21.4 NSTEMI, INITIAL EPISODE OF CARE: Primary | ICD-10-CM

## 2024-02-23 PROCEDURE — 93798 PHYS/QHP OP CAR RHAB W/ECG: CPT

## 2024-02-28 ENCOUNTER — APPOINTMENT (OUTPATIENT)
Dept: CARDIAC REHAB | Facility: HOSPITAL | Age: 64
End: 2024-02-28
Payer: COMMERCIAL

## 2024-02-28 ENCOUNTER — TELEPHONE (OUTPATIENT)
Dept: CARDIAC REHAB | Facility: HOSPITAL | Age: 64
End: 2024-02-28
Payer: COMMERCIAL

## 2024-02-28 NOTE — TELEPHONE ENCOUNTER
Patient called to cancel Phase II Cardiac Rehab session. Patient states she is not feeling well today. Patient plans to return to Cardiac Rehab 3/6/24.

## 2024-02-29 ENCOUNTER — APPOINTMENT (OUTPATIENT)
Dept: CARDIAC REHAB | Facility: HOSPITAL | Age: 64
End: 2024-02-29
Payer: COMMERCIAL

## 2024-03-06 ENCOUNTER — TREATMENT (OUTPATIENT)
Dept: CARDIAC REHAB | Facility: HOSPITAL | Age: 64
End: 2024-03-06
Payer: COMMERCIAL

## 2024-03-06 ENCOUNTER — PATIENT ROUNDING (BHMG ONLY) (OUTPATIENT)
Dept: CARDIOLOGY | Facility: CLINIC | Age: 64
End: 2024-03-06
Payer: COMMERCIAL

## 2024-03-06 ENCOUNTER — OFFICE VISIT (OUTPATIENT)
Dept: CARDIOLOGY | Facility: CLINIC | Age: 64
End: 2024-03-06
Payer: COMMERCIAL

## 2024-03-06 VITALS
HEIGHT: 69 IN | DIASTOLIC BLOOD PRESSURE: 58 MMHG | HEART RATE: 50 BPM | WEIGHT: 182.2 LBS | SYSTOLIC BLOOD PRESSURE: 138 MMHG | OXYGEN SATURATION: 96 % | BODY MASS INDEX: 26.98 KG/M2

## 2024-03-06 DIAGNOSIS — I25.42 SPONTANEOUS DISSECTION OF CORONARY ARTERY: ICD-10-CM

## 2024-03-06 DIAGNOSIS — I10 ESSENTIAL HYPERTENSION: ICD-10-CM

## 2024-03-06 DIAGNOSIS — E78.5 HYPERLIPIDEMIA LDL GOAL <70: ICD-10-CM

## 2024-03-06 DIAGNOSIS — I48.0 PAROXYSMAL ATRIAL FIBRILLATION: Primary | ICD-10-CM

## 2024-03-06 DIAGNOSIS — I21.4 NSTEMI, INITIAL EPISODE OF CARE: Primary | ICD-10-CM

## 2024-03-06 PROCEDURE — 99213 OFFICE O/P EST LOW 20 MIN: CPT | Performed by: INTERNAL MEDICINE

## 2024-03-06 PROCEDURE — 93000 ELECTROCARDIOGRAM COMPLETE: CPT | Performed by: INTERNAL MEDICINE

## 2024-03-06 PROCEDURE — 93798 PHYS/QHP OP CAR RHAB W/ECG: CPT

## 2024-03-06 RX ORDER — POLYETHYLENE GLYCOL 3350 17 G/17G
17 POWDER, FOR SOLUTION ORAL DAILY
COMMUNITY

## 2024-03-06 NOTE — PROGRESS NOTES
[facility]    Patient ID: Carolyn Chaparro is a 63 y.o. female.  : 1960   Contact: [phone]    Encounter date: 2024    PCP: Erika Hernandez PA      Chief complaint:   Chief Complaint   Patient presents with    Atrial Fibrillation     Paroxysmal atrial fibrillation           Problem List:  Coronary artery dissection:  C/NSTEMI, 10/02/2018: Spontaneous circumflex dissection with improvement in flow initially following an attempted wiring of the vessel. With ongoing attempts at advancing the wire however the dissection propagated proximally. With the use of Cardene, nitroglycerin, and Integrilin flow was improved. No evidence of LAD or RCA disease.  Echo, 10/02/2018: EF 60%. Trace Mr and TR.  24h Holter, 2018: occasional PVC's, rare couplets, rare PAC's, brief atrial tachycardia, 13 beats.  Echo, 2019: EF 60%. LV systolic function is normal. No wall motion normalities are identified.  Echo, 10/12/2022: EF 55%. Trace to mild MR/TR. Trace aortic insufficiency. RVSP 7 mmHg.  [facility] admission for Non-STEMI, 2024.  Echo, 2024: EF 60%. Trace MR. Trace to mild TR with RVSP 24 mmHg.   [facility], 2024: Occluded D1 in a small distal segment, possibly related to SCAD given her history. Improved left circumflex flow, healed from prior angiogram in 2018 (related to SCAD). Otherwise minimal disease a diffusely tortuous right dominant system. Normal LVEDP of 12mmHg.  Paroxymal atrial fibrillation  [facility] ED, 2023: Atrial fibrillation with RVR  CHADS-VASc=2 (female, HTN)  [facility], 2023: SB/SR. Occasional PACs/PVCs. Brief atach.  Palpitations:  2 week monitor, 2019: Occasional PVCs (1.8%), occasional couplets. Brief atach, longest 14 beats.  Hypertension  Hyperlipidemia  Snoring  GERD    Allergies   Allergen Reactions    Bisoprolol Other (See Comments)     Ineffective in controlling palps    Erythromycin Diarrhea       Current  "Medications:    Current Outpatient Medications:     aspirin 81 MG EC tablet, Take 1 tablet by mouth Daily., Disp: , Rfl:     atorvastatin (LIPITOR) 80 MG tablet, Take 1 tablet by mouth Daily., Disp: 90 tablet, Rfl: 0    calcium carbonate (OS-MOIZ) 600 MG tablet, Take 1 tablet by mouth Daily., Disp: , Rfl:     cetirizine (zyrTEC) 10 MG tablet, Take 1 tablet by mouth Daily., Disp: , Rfl:     cholecalciferol (VITAMIN D3) 1000 units tablet, Take 1 tablet by mouth Daily., Disp: , Rfl:     clopidogrel (PLAVIX) 75 MG tablet, Take 1 tablet by mouth Daily., Disp: 90 tablet, Rfl: 0    ibuprofen (ADVIL,MOTRIN) 200 MG tablet, Take 2 tablets by mouth 2 (Two) Times a Day As Needed for Mild Pain (For headaches)., Disp: , Rfl:     MAGNESIUM GLYCINATE PO, Take 200 mg by mouth Daily., Disp: , Rfl:     metoprolol succinate XL (TOPROL-XL) 50 MG 24 hr tablet, Take 1/2 tablet by mouth in the morning and 1 tablet at night, Disp: , Rfl:     Multiple Vitamins-Minerals (MULTIVITAMIN ADULT PO), Take 1 tablet by mouth Daily., Disp: , Rfl:     pantoprazole (PROTONIX) 40 MG EC tablet, Take 1 tablet by mouth Daily., Disp: , Rfl:     polyethylene glycol (MiraLax) 17 GM/SCOOP powder, Take 17 g by mouth Daily., Disp: , Rfl:     Probiotic Product (PROBIOTIC ADVANCED PO), Take 1 tablet by mouth Daily., Disp: , Rfl:     terazosin (HYTRIN) 2 MG capsule, Take 1 capsule by mouth Every Night., Disp: , Rfl:     vitamin B-12 (CYANOCOBALAMIN) 1000 MCG tablet, Take 1 tablet by mouth Daily., Disp: , Rfl:     HPI    Carolyn Chaparro is a 63 y.o. female who presents today for a follow up of spontaneous dissection of coronary artery, PAF, and cardiac risk factors. Since last visit, patient has been doing well overall from a cardiovascular standpoint. She has an upcoming appointment in 3 weeks at [place] for genetic testing. Patient states her first dissection occurred while constipated and said she started \"seeing stars.\" The second dissection occurred " "while she was driving home from work when she felt a spasm in her chest. She then went into her home and her blood pressure was 205/105 mmHg, her  then brought her to the ED. Patient stays busy and active by attending cardiac rehab three times weekly.     The following portions of the patient's history were reviewed and updated as appropriate: allergies, current medications and problem list.    Pertinent positives as listed in the HPI.  All other systems reviewed are negative.         Vitals:    03/06/24 1357   BP: 138/58   BP Location: Left arm   Patient Position: Sitting   Pulse: 50   SpO2: 96%   Weight: 82.6 kg (182 lb 3.2 oz)   Height: 175.3 cm (69\")       Physical Exam:  General: Alert and oriented.  Neck: Jugular venous pressure is within normal limits. Carotids have normal upstrokes without bruits.   Cardiovascular: Heart has a nondisplaced focal PMI. Regular rate and rhythm. No murmur, gallop or rub.  Lungs: Clear, no rales or wheezes. Equal expansion is noted.   Extremities: Show no edema.  Skin: Warm and dry.  Neurologic: Nonfocal.     Diagnostic Data (reviewed with patient):  Lab Results   Component Value Date    GLUCOSE 125 (H) 02/01/2024    BUN 11 02/01/2024    CREATININE 0.60 02/01/2024    BCR 18.3 02/01/2024     02/01/2024    K 4.0 02/01/2024     02/01/2024    CO2 25.0 02/01/2024    CALCIUM 8.9 02/01/2024    ALBUMIN 4.2 02/01/2024    ALKPHOS 59 02/01/2024    AST 58 (H) 02/01/2024    ALT 24 02/01/2024     Lab Results   Component Value Date    CHOL 123 01/31/2024    TRIG 56 01/31/2024    HDL 44 01/31/2024    LDL 67 01/31/2024      Lab Results   Component Value Date    WBC 6.97 02/01/2024    RBC 4.43 02/01/2024    HGB 13.6 02/01/2024    HCT 39.0 02/01/2024    MCV 88.0 02/01/2024     02/01/2024      Lab Results   Component Value Date    TSH 2.450 01/30/2024        Advance Care Planning   ACP discussion was held with the patient during this visit. Patient has an advance directive " in EMR which is still valid.            ECG 12 Lead    Date/Time: 3/6/2024 2:11 PM  Performed by: Hellen Allan MD    Authorized by: Hellen Allan MD  Comparison: compared with previous ECG from 1/31/2024  Comparison to previous ECG: SB  T wave abnormality  Rhythm: sinus bradycardia  BPM: 50  Other findings: T wave abnormality    Clinical impression: abnormal EKG  Comments: Lateral ischemia            Assessment:    ICD-10-CM ICD-9-CM   1. Paroxysmal atrial fibrillation  I48.0 427.31   2. Spontaneous dissection of coronary artery  I25.42 414.12   3. Essential hypertension  I10 401.9   4. Hyperlipidemia LDL goal <70  E78.5 272.4         Plan:  Continue on aspirin 81 mg for antiplatelet therapy.   Continue on atorvastatin 80 mg daily for hyperlipidemia.   Continue on Plavix 75 mg daily for antiplatelet therapy.   Continue on metoprolol 75 mg daily for rate control and hypertension.   Continue on terazosin 2 mg nightly for hypertension.  Continue all other current medications.  Genetic evaluation at [place] 3/29/2024  F/up in 4 months, sooner if needed.      Scribed for Hellen Allan MD by Yolanda Vera. 3/6/2024 14:13 EST    I Hellen Allan MD personally performed the services described in this documentation as scribed by the above individual in my presence, and it is both accurate and complete.    Hellen Allan MD, [facility]

## 2024-03-06 NOTE — PROGRESS NOTES
March 6, 2024    Hello, may I speak with Carolyn Chaparro?    My name is JOSIANE VARNER      I am  with [facility] CARDIOLOGY  [address]  [place] [address] [phone].    Before we get started may I verify your date of birth? 1960    I am calling to officially welcome you to our practice and ask about your recent visit. Is this a good time to talk? yes    Tell me about your visit with us. What things went well?  DIDN'T HAVE TO WAIT LONG THEY WHERE WAITING FOR ME IN THE ROOM , DR MARQUEZ IS THE BEST       We're always looking for ways to make our patients' experiences even better. Do you have recommendations on ways we may improve?  no    Overall were you satisfied with your first visit to our practice? yes       I appreciate you taking the time to speak with me today. Is there anything else I can do for you? no      Thank you, and have a great day.

## 2024-03-07 ENCOUNTER — TREATMENT (OUTPATIENT)
Dept: CARDIAC REHAB | Facility: HOSPITAL | Age: 64
End: 2024-03-07
Payer: COMMERCIAL

## 2024-03-07 DIAGNOSIS — I21.4 NSTEMI, INITIAL EPISODE OF CARE: Primary | ICD-10-CM

## 2024-03-07 PROCEDURE — 93798 PHYS/QHP OP CAR RHAB W/ECG: CPT

## 2024-03-08 ENCOUNTER — TREATMENT (OUTPATIENT)
Dept: CARDIAC REHAB | Facility: HOSPITAL | Age: 64
End: 2024-03-08
Payer: COMMERCIAL

## 2024-03-08 DIAGNOSIS — I21.4 NSTEMI, INITIAL EPISODE OF CARE: Primary | ICD-10-CM

## 2024-03-08 PROCEDURE — 93798 PHYS/QHP OP CAR RHAB W/ECG: CPT

## 2024-03-13 ENCOUNTER — TELEPHONE (OUTPATIENT)
Dept: CARDIAC REHAB | Facility: HOSPITAL | Age: 64
End: 2024-03-13
Payer: COMMERCIAL

## 2024-03-13 NOTE — TELEPHONE ENCOUNTER
Patient called to cancel exercise session today. Patient states that she is tied up with work and plans to return tomorrow.

## 2024-03-18 RX ORDER — ROSUVASTATIN CALCIUM 5 MG/1
TABLET, COATED ORAL
Qty: 90 TABLET | Refills: 3 | Status: SHIPPED | OUTPATIENT
Start: 2024-03-18

## 2024-03-18 NOTE — TELEPHONE ENCOUNTER
Lab Results   Component Value Date    CHOL 123 01/31/2024    TRIG 56 01/31/2024    HDL 44 01/31/2024    LDL 67 01/31/2024

## 2024-03-20 ENCOUNTER — APPOINTMENT (OUTPATIENT)
Dept: CARDIAC REHAB | Facility: HOSPITAL | Age: 64
End: 2024-03-20
Payer: COMMERCIAL

## 2024-03-20 ENCOUNTER — TELEPHONE (OUTPATIENT)
Dept: CARDIAC REHAB | Facility: HOSPITAL | Age: 64
End: 2024-03-20
Payer: COMMERCIAL

## 2024-03-20 NOTE — PROGRESS NOTES
"  NUTRITION ASSESSMENT & EDUCATION  CARDIAC/PULMONARY REHAB      Appointment Date and Time: 24, 08:44 EDT  Patient Name: Carolyn Chaparro   Age: 63 y.o.     Sex:  female      Employment/Activity Level:   Carolyn's education level: college  Occupation:  [employer]                          Job Activity Level: mild  Routine Exercise: {Desc; mild/moderate/stron}                                    Weight Assessment:   Height:   Ht Readings from Last 1 Encounters:   24 175.3 cm (69\")     Weight:   Wt Readings from Last 1 Encounters:   24 82.6 kg (182 lb 3.2 oz)         BMI:    BMI Readings from Last 1 Encounters:   24 26.91 kg/m²       -------------------------------------------------------------------------------------------------  IBW: Ideal body weight: 66.2 kg (145 lb 15.1 oz)  Adjusted ideal body weight: 72.8 kg (160 lb 7.1 oz)  -------------------------------------------------------------------------------------------------  Weight Assessment: Overweight  Weight Change last six months: ***       Usual Weight: *** lb       Desired Weight: *** lb    Cardiac Risk Factors:         Atherosclerotic heart disease       Stents       Hypertension       Heart palpitations, NSTEMI, spontaneous dissection of coronary artery, GERD, CJ    Pertinent Lab Values:     Total Cholesterol   Date Value Ref Range Status   2024 123 0 - 200 mg/dL Final     HDL Cholesterol   Date Value Ref Range Status   2024 44 40 - 60 mg/dL Final     LDL Cholesterol    Date Value Ref Range Status   2024 67 0 - 100 mg/dL Final     LDL/HDL Ratio   Date Value Ref Range Status   2024 1.54  Final     Triglycerides   Date Value Ref Range Status   2024 56 0 - 150 mg/dL Final     Hemoglobin A1C   Date Value Ref Range Status   2024 4.90 4.80 - 5.60 % Final     TSH   Date Value Ref Range Status   2024 2.450 0.270 - 4.200 uIU/mL Final     Glucose   Date Value Ref Range Status "   02/01/2024 125 (H) 65 - 99 mg/dL Final         Pertinent Medications:   Nutritional Supplements: reviewed  Pertinent Nutrition-Related Medications: Reviewed - no changes recommended at this time.  Self Identified Problems or Concerns:   ***    Nutrition Influences:   Appetite: {good/fair/etc:777985184}           Taste/smell changes:  {Yes/No/WC:363660}  Factors limiting PO intake: none    Food records reviewed?: {kjr_food_review:28233}    Carolyn lives with:   Carolyn receives lifestyle support from others at home?: {Yes/No/WC:386710}  Spouse/significant other present for diet instruction today?: {Yes/No/WC:473568}    Diet Assessment:   Diet Survey Score: 57 of possible 72 =  79  % compliant with AHA guidelines    Meal intake pattern:  ***  Dining out:  ***               Fast food:  ***    24 hour recall:   YESSY NOONAN    Who does the grocery shopping:  ***  Who does the cooking at home:  ***                     Home diet review:  {KJR DIET QUALITY:45519}    Motivation level toward diet compliance:  {motivation level:372066227}  Assessment / Recommendations:   Prior diet education before to coming to Cardiac Rehab?: No  Instructed provided on:  American Heart Association 2021 Nutrition Guidelines, Saturated Fat, Picking Healthy Proteins, Get the Scoop on Sodium/Salt <2300 mg/d, Added Sugars Guidance, Go Nuts for Heart Health, [place] 3 fats in Fish & Seafood, Dining Out Doesn't Mean Ditch Your Diet, and Foods to Avoid on the Cardiac Diet  Written materials provided to patient: Yes    Goals/Plan:   Patient defined goals:   1)  2)    Plan:  Encouraged to complete goals and continue setting new nutrition/exercise goals             Encouraged to follow up with dietitian during cardiac rehab sessions; may request additional RD counseling.    Nydia Frye RD, 08:44 EDT - 3/20/2024

## 2024-03-20 NOTE — TELEPHONE ENCOUNTER
Patient called staff to cancel Phase II Cardiac Rehab session today and next week. Patient stated that she is very busy with work and is having a hard time committing to the program. Pt. Asked staff if she could pause cardiac rehab until June. Due to program and insurance protocols staff unable to pause program. Pt. Stated that she is unable to participate in Phase II Cardiac Rehab and wished to be discahrged from the program at this time. Staff informed patient that in order to participate in cardiac rehab in the future she would have to have a readmitting diagnosis. Patient understands. Staff discharged patient from the program at this time. Patient agreeable. Staff available if additional assistance needed.

## 2024-03-21 ENCOUNTER — APPOINTMENT (OUTPATIENT)
Dept: CARDIAC REHAB | Facility: HOSPITAL | Age: 64
End: 2024-03-21
Payer: COMMERCIAL

## 2024-03-22 ENCOUNTER — APPOINTMENT (OUTPATIENT)
Dept: CARDIAC REHAB | Facility: HOSPITAL | Age: 64
End: 2024-03-22
Payer: COMMERCIAL

## 2024-03-27 ENCOUNTER — APPOINTMENT (OUTPATIENT)
Dept: CARDIAC REHAB | Facility: HOSPITAL | Age: 64
End: 2024-03-27
Payer: COMMERCIAL

## 2024-03-28 ENCOUNTER — APPOINTMENT (OUTPATIENT)
Dept: CARDIAC REHAB | Facility: HOSPITAL | Age: 64
End: 2024-03-28
Payer: COMMERCIAL

## 2024-05-03 RX ORDER — ATORVASTATIN CALCIUM 80 MG/1
80 TABLET, FILM COATED ORAL DAILY
Qty: 30 TABLET | Refills: 11 | Status: SHIPPED | OUTPATIENT
Start: 2024-05-03

## 2024-05-17 NOTE — PROGRESS NOTES
Sleep Clinic Follow Up Note    Chief Complaint  Sleep Apnea    Subjective     History of Present Illness (from previous encounter on 11/22/2023):  Carolyn Chaparro is a 63 y.o. female who returns for follow-up and compliance of PAP therapy.  The Pap report has been reviewed.  Overall usage is at 74% with compliance at 35%.  Patient averages 4 hours 3 minutes on nights used.  AHI is 5.2/h.  A home sleep test obtained on 9/19/2023 revealed severe obstructive sleep apnea with an AHI of 31.7/H.  I note an air leak at 18 L/min.  Patient may benefit from mask fitting with DME company.  We may need to consider titration study.  (End copied text).    Interval History:  Carolyn Chaparro is a 63 y.o. female returns for follow up and compliance of PAP therapy. The patient was last seen on 11/22/2023 by me. Overall the patient feels good with regard to therapy. The device appears to be working appropriately. On average the patient sleeps 7 hours per night. The patient wakes   0 times per night.     The patient reports the following changes to their medical and medication history since they were last seen:  Started Lipitor  Small heart disection      Further details are as follows:      [place] Scale is (out of 24): Total score: 8     Weight:  Current Weight: 180 lb    Weight change in the last year:  gain: 0 lbs    The patient's relevant past medical, surgical, family, and social history reviewed and updated in Epic as appropriate.    PMH:    Past Medical History:   Diagnosis Date    Anxiety     Arrhythmia afib after covid    last time was 5/24/23    Arthritis 2011    No problems now.    Atrial fibrillation 05/14/2023    stopped after 90 min. went to ER    Chronic constipation     GERD (gastroesophageal reflux disease)     Headache     Heart palpitations     Hypertension     Myocardial infarction 10/01/18    Spontaneous dissection    CJ (obstructive sleep apnea) 09/18/2023    Osteopenia     Post-menopause on  "HRT (hormone replacement therapy)     not currently    PVCs (premature ventricular contractions)     hx    Spontaneous dissection of coronary artery     \"attempted stent, but heart re-routed itself\"      Past Surgical History:   Procedure Laterality Date    CARDIAC CATHETERIZATION N/A 10/02/2018    Procedure: Left Heart Cath;  Surgeon: Hellen Allan MD;  Location:  [facility] CATH [facility];  Service: Cardiology    CARDIAC CATHETERIZATION N/A 2024    Procedure: Left Heart Cath;  Surgeon: Yo Grewal III, MD;  Location:  [facility] CATH [facility];  Service: Cardiology;  Laterality: N/A;    COLONOSCOPY      LAPAROSCOPIC ASSISTED VAGINAL HYSTERECTOMY SALPINGO OOPHORECTOMY Bilateral     LAPAROSCOPY WITH LASER      NASAL POLYP EXCISION      benign-caused nose bleeds    PERINEOPLASTY      POSTERIOR REPAIR      SACROCOLPOPEXY N/A 2021    Procedure: SACROCOLPOPEXY LAPAROSCOPIC WITH ROBOTIC WITH MID URETHRAL SLING  CYSTOSCOPY POSTERIOR REPAIR;  Surgeon: Thelma Mortensen MD;  Location:  [facility] OR;  Service: Robotics - DaVinci;  Laterality: N/A;    WISDOM TOOTH EXTRACTION       OB History          3    Para   2    Term                AB        Living   2         SAB        IAB        Ectopic        Molar        Multiple        Live Births                  Allergies   Allergen Reactions    Erythromycin Diarrhea    Bisoprolol Other (See Comments)     Ineffective in controlling palps       MEDS:  Prior to Admission medications    Medication Sig Start Date End Date Taking? Authorizing Provider   aspirin 81 MG EC tablet Take 1 tablet by mouth Daily.    Provider, MD Vernon   atorvastatin (LIPITOR) 80 MG tablet Take 1 tablet by mouth Daily. 5/3/24   Hellen Allan MD   calcium carbonate (OS-MOIZ) 600 MG tablet Take 1 tablet by mouth Daily.    Provider, MD Vernon   cetirizine (zyrTEC) 10 MG tablet Take 1 tablet by mouth Daily.    Emergency, Nurse Loco, RN " "  cholecalciferol (VITAMIN D3) 1000 units tablet Take 1 tablet by mouth Daily.    Vernon Padilla MD   clopidogrel (PLAVIX) 75 MG tablet Take 1 tablet by mouth Daily. 2/2/24   Nereida Hu DO   ibuprofen (ADVIL,MOTRIN) 200 MG tablet Take 2 tablets by mouth 2 (Two) Times a Day As Needed for Mild Pain (For headaches).    Vernon Padilla MD   MAGNESIUM GLYCINATE PO Take 200 mg by mouth Daily.    Vernon Padilla MD   metoprolol succinate XL (TOPROL-XL) 50 MG 24 hr tablet Take 1/2 tablet by mouth in the morning and 1 tablet at night    Vernon Padilla MD   Multiple Vitamins-Minerals (MULTIVITAMIN ADULT PO) Take 1 tablet by mouth Daily.    Vernon Padilla MD   pantoprazole (PROTONIX) 40 MG EC tablet Take 1 tablet by mouth Daily.    Vernon Padilla MD   polyethylene glycol (MiraLax) 17 GM/SCOOP powder Take 17 g by mouth Daily.    Vernon Padilla MD   Probiotic Product (PROBIOTIC ADVANCED PO) Take 1 tablet by mouth Daily.    Vernon Padilla MD   terazosin (HYTRIN) 2 MG capsule Take 1 capsule by mouth Every Night.    Vernon Padilla MD   vitamin B-12 (CYANOCOBALAMIN) 1000 MCG tablet Take 1 tablet by mouth Daily.    Vernon Padilla MD         FH:  Family History   Problem Relation Age of Onset    Stroke Mother         multiple strokes    Heart disease Mother         quad bypass    Hypertension Mother     Heart failure Father         CHF    Thyroid disease Sister     Thyroid disease Sister        Objective   Vital Signs:  /70   Pulse 50   Temp 96.8 °F (36 °C)   Ht 175.3 cm (69\")   Wt 82 kg (180 lb 12.8 oz)   SpO2 94%   BMI 26.70 kg/m²             Physical Exam  Vitals reviewed.   Constitutional:       Appearance: Normal appearance.   HENT:      Head: Normocephalic and atraumatic.      Nose: Nose normal.      Mouth/Throat:      Mouth: Mucous membranes are moist.   Cardiovascular:      Rate and Rhythm: Normal rate and regular rhythm.      Heart " sounds: No murmur heard.     No friction rub. No gallop.   Pulmonary:      Effort: Pulmonary effort is normal. No respiratory distress.      Breath sounds: Normal breath sounds. No wheezing or rhonchi.   Neurological:      Mental Status: She is alert and oriented to person, place, and time.   Psychiatric:         Behavior: Behavior normal.               Result Review :           PAP Report:  AHI: 0.5/h  Days of Usage: 75/90 (83%)  Number of Days Greater than 4 hours: 70/90 (78%)  Average time (days used): 6 hours 47 minutes  95th Percentile Pressure: 9.8 cmH2O  95th percentile leaks: 29.8 L/min  Settings: Auto CPAP-8/10 cm H2O, EPR full-time, EPR level 3, response standard.       Assessment and Plan  Carolyn Chaparro is a 63 y.o. female who returns for follow-up and compliance of PAP therapy.  The Pap report has been reviewed.  Overall usage is at 83% with compliance at 78%.  The patient averages 6 hours and 47 minutes of therapy.  Sleep apnea is well-controlled with an AHI of 0.5/H. I will refill the patient's supplies, and I have asked them to return for follow-up and compliance in 1 year or sooner should they have further questions or concerns.    Diagnoses and all orders for this visit:    1. CJ (obstructive sleep apnea) (Primary)  -     PAP Therapy    2. Overweight (BMI 25.0-29.9)         The patient continues to use and benefit from PAP therapy.    1. The patient was counseled regarding multimodal approach with healthy nutrition, healthy sleep, regular physical activity, social activities, counseling, and medications. Encouraged to practice lateral sleep position. Avoid alcohol and sedatives close to bedtime.     2.  We will refill supplies x1 year.  Return to clinic 1 year or sooner if symptoms warrant. I have reviewed the results of my evaluation and impression and discussed my recommendations in detail with the patient.           Follow Up  Return in about 1 year (around 5/22/2025) for Annual  visit.  Patient was given instructions and counseling regarding her condition or for health maintenance advice. Please see specific information pulled into the AVS if appropriate.       REE Rodrigez, ACNP-BC  Pulmonology, Critical Care, and Sleep Medicine

## 2024-05-22 ENCOUNTER — OFFICE VISIT (OUTPATIENT)
Dept: SLEEP MEDICINE | Age: 64
End: 2024-05-22
Payer: COMMERCIAL

## 2024-05-22 VITALS
BODY MASS INDEX: 26.78 KG/M2 | DIASTOLIC BLOOD PRESSURE: 70 MMHG | OXYGEN SATURATION: 94 % | SYSTOLIC BLOOD PRESSURE: 118 MMHG | WEIGHT: 180.8 LBS | TEMPERATURE: 96.8 F | HEIGHT: 69 IN | HEART RATE: 50 BPM

## 2024-05-22 DIAGNOSIS — E66.3 OVERWEIGHT (BMI 25.0-29.9): ICD-10-CM

## 2024-05-22 DIAGNOSIS — G47.33 OSA (OBSTRUCTIVE SLEEP APNEA): Primary | ICD-10-CM

## 2024-06-07 RX ORDER — TERAZOSIN 2 MG/1
2 CAPSULE ORAL NIGHTLY
Qty: 90 CAPSULE | Refills: 3 | Status: SHIPPED | OUTPATIENT
Start: 2024-06-07 | End: 2024-06-10

## 2024-06-07 NOTE — TELEPHONE ENCOUNTER
Lab Results   Component Value Date    GLUCOSE 125 (H) 02/01/2024    BUN 11 02/01/2024    CREATININE 0.60 02/01/2024    EGFR 102.3 02/01/2024    BCR 18.3 02/01/2024    K 4.0 02/01/2024    CO2 25.0 02/01/2024    CALCIUM 8.9 02/01/2024    ALBUMIN 4.2 02/01/2024    BILITOT 0.4 02/01/2024    AST 58 (H) 02/01/2024    ALT 24 02/01/2024

## 2024-06-10 RX ORDER — TERAZOSIN 2 MG/1
2 CAPSULE ORAL NIGHTLY
Qty: 90 CAPSULE | Refills: 3 | Status: SHIPPED | OUTPATIENT
Start: 2024-06-10

## 2024-07-03 ENCOUNTER — PATIENT MESSAGE (OUTPATIENT)
Dept: CARDIOLOGY | Facility: CLINIC | Age: 64
End: 2024-07-03
Payer: COMMERCIAL

## 2024-07-03 NOTE — TELEPHONE ENCOUNTER
Called pt and told her it would be okay to start light exercise. I told the pt if she feels weird or her HR spikes at any point that she should stop physical activity, and she could always go to the hospital if she has any other cardiac symptoms (chest tightness, SOB, etc.). Pt was agreeable.

## 2024-07-10 ENCOUNTER — OFFICE VISIT (OUTPATIENT)
Dept: CARDIOLOGY | Facility: CLINIC | Age: 64
End: 2024-07-10
Payer: COMMERCIAL

## 2024-07-10 VITALS
OXYGEN SATURATION: 98 % | WEIGHT: 176 LBS | BODY MASS INDEX: 26.07 KG/M2 | HEIGHT: 69 IN | HEART RATE: 52 BPM | SYSTOLIC BLOOD PRESSURE: 133 MMHG | DIASTOLIC BLOOD PRESSURE: 72 MMHG

## 2024-07-10 DIAGNOSIS — I20.0 PROGRESSIVE ANGINA: Primary | ICD-10-CM

## 2024-07-10 DIAGNOSIS — I10 PRIMARY HYPERTENSION: ICD-10-CM

## 2024-07-10 DIAGNOSIS — I25.42 SPONTANEOUS DISSECTION OF CORONARY ARTERY: ICD-10-CM

## 2024-07-10 PROCEDURE — 99214 OFFICE O/P EST MOD 30 MIN: CPT | Performed by: PHYSICIAN ASSISTANT

## 2024-07-10 RX ORDER — NITROGLYCERIN 0.4 MG/1
TABLET SUBLINGUAL
Qty: 25 TABLET | Refills: 11 | Status: SHIPPED | OUTPATIENT
Start: 2024-07-10

## 2024-07-10 NOTE — PROGRESS NOTES
[facility] Cardiology    Date: 07/10/2024    Patient ID: Carolyn Chaparro is a 64 y.o. female   : 1960   Contact: [phone]         Chief Complaint:    Chief Complaint   Patient presents with    Paroxysmal atrial fibrillation       Problem List:  Coronary artery dissection:  C/NSTEMI, 10/02/2018: Spontaneous circumflex dissection with improvement in flow initially following an attempted wiring of the vessel. With ongoing attempts at advancing the wire however the dissection propagated proximally. With the use of Cardene, nitroglycerin, and Integrilin flow was improved. No evidence of LAD or RCA disease.  Echo, 10/02/2018: EF 60%. Trace Mr and TR.  24h Holter, 2018: occasional PVC's, rare couplets, rare PAC's, brief atrial tachycardia, 13 beats.  Echo, 2019: EF 60%. LV systolic function is normal. No wall motion normalities are identified.  Echo, 10/12/2022: EF 55%. Trace to mild MR/TR. Trace aortic insufficiency. RVSP 7 mmHg.  [facility] admission for Non-STEMI, 2024.  Echo, 2024: EF 60%. Trace MR. Trace to mild TR with RVSP 24 mmHg.   [facility], 2024: Occluded D1 in a small distal segment, possibly related to SCAD given her history. Improved left circumflex flow, healed from prior angiogram in 2018 (related to SCAD). Otherwise minimal disease a diffusely tortuous right dominant system. Normal LVEDP of 12mmHg.  Paroxymal atrial fibrillation  [facility] ED, 2023: Atrial fibrillation with RVR  CHADS-VASc=2 (female, HTN)  [facility]OT, 2023: SB/SR. Occasional PACs/PVCs. Brief atach.  Palpitations:  2 week monitor, 2019: Occasional PVCs (1.8%), occasional couplets. Brief atach, longest 14 beats.  Hypertension  Hyperlipidemia  Snoring  GERD      Allergies   Allergen Reactions    Erythromycin Diarrhea    Bisoprolol Other (See Comments)     Ineffective in controlling palps         Current Outpatient Medications:     aspirin 81 MG EC tablet, Take 1  tablet by mouth Daily., Disp: , Rfl:     atorvastatin (LIPITOR) 80 MG tablet, Take 1 tablet by mouth Daily., Disp: 30 tablet, Rfl: 11    calcium carbonate (OS-MOIZ) 600 MG tablet, Take 1 tablet by mouth Daily., Disp: , Rfl:     cetirizine (zyrTEC) 10 MG tablet, Take 1 tablet by mouth Daily., Disp: , Rfl:     cholecalciferol (VITAMIN D3) 1000 units tablet, Take 1 tablet by mouth Daily., Disp: , Rfl:     ibuprofen (ADVIL,MOTRIN) 200 MG tablet, Take 2 tablets by mouth 2 (Two) Times a Day As Needed for Mild Pain (For headaches)., Disp: , Rfl:     MAGNESIUM GLYCINATE PO, Take 200 mg by mouth Daily., Disp: , Rfl:     metoprolol succinate XL (TOPROL-XL) 50 MG 24 hr tablet, Take 1/2 tablet by mouth in the morning and 1 tablet at night, Disp: , Rfl:     Multiple Vitamins-Minerals (MULTIVITAMIN ADULT PO), Take 1 tablet by mouth Daily., Disp: , Rfl:     pantoprazole (PROTONIX) 40 MG EC tablet, Take 1 tablet by mouth Daily., Disp: , Rfl:     polyethylene glycol (MiraLax) 17 GM/SCOOP powder, Take 17 g by mouth Daily., Disp: , Rfl:     Probiotic Product (PROBIOTIC ADVANCED PO), Take 1 tablet by mouth Daily., Disp: , Rfl:     terazosin (HYTRIN) 2 MG capsule, TAKE ONE CAPSULE BY MOUTH ONCE NIGHTLY, Disp: 90 capsule, Rfl: 3    vitamin B-12 (CYANOCOBALAMIN) 1000 MCG tablet, Take 1 tablet by mouth Daily., Disp: , Rfl:     nitroglycerin (NITROSTAT) 0.4 MG SL tablet, 1 under the tongue as needed for angina, may repeat q5mins for up three doses, Disp: 25 tablet, Rfl: 11     History of Present Illness: Carolyn Chaparro is a 64 y.o. female who is here today for early follow-up for history of spontaneous coronary artery dissection, PAF, and hyperlipidemia.  Patient states that she has started to become more active recently and while going to the gym she has noticed chest discomfort.  She states it is similar in nature to what she has had prior to her heart catheterization showing dissections but not as intense.  She has stopped her  "exercise and the pain immediately goes away.  It is also associated initially with rapid increase of her heart rate.  She has been checking her blood pressure at home and it has been normal.  Patient states obviously with her previous history this concerns her.  She has not taken any nitroglycerin to help with the pain because it goes away eventually with rest.      The following portions of the patient's history were reviewed and updated as appropriate: allergies, current medications and problem list.     Pertinent positives as listed in the HPI.  All other systems reviewed are negative.            Vitals:    07/10/24 1440   BP: 133/72   BP Location: Right arm   Patient Position: Sitting   Cuff Size: Adult   Pulse: 52   SpO2: 98%   Weight: 79.8 kg (176 lb)   Height: 175.3 cm (69.02\")     Body mass index is 25.98 kg/m².    Physical Exam:  General: Alert and oriented.  Neck: Jugular venous pressure is within normal limits. Carotids have normal upstrokes without bruits.   Cardiovascular: Regular rate and rhythm. No murmur, gallop or rub.  Lungs: Clear, no rales or wheezes. Equal expansion is noted.   Extremities: No peripheral edema  Skin: Warm and dry.  Neurologic: Nonfocal.     Diagnostic data (reviewed with patient):  CMP:   Lab Results   Component Value Date    GLUCOSE 125 (H) 02/01/2024    BUN 11 02/01/2024    CREATININE 0.60 02/01/2024    EGFRIFNONA 95 02/09/2021    BCR 18.3 02/01/2024    K 4.0 02/01/2024    CO2 25.0 02/01/2024    CALCIUM 8.9 02/01/2024    ALBUMIN 4.2 02/01/2024    AST 58 (H) 02/01/2024    ALT 24 02/01/2024       CBC:    Lab Results   Component Value Date    WBC 6.97 02/01/2024    HGB 13.6 02/01/2024    HCT 39.0 02/01/2024    MCV 88.0 02/01/2024     02/01/2024       LIPIDS:    Lab Results   Component Value Date    CHOL 123 01/31/2024    TRIG 56 01/31/2024    HDL 44 01/31/2024    LDL 67 01/31/2024       HgbA1c:    Lab Results   Component Value Date    HGBA1C 4.90 01/31/2024                 "   Assessment:  1. Progressive angina    2. Spontaneous dissection of coronary artery    3. Primary hypertension             Plan:  I had a long discussion with the patient.  Her symptoms do present as angina and I will order stress test.  However I will discuss the case with Dr. Allan given her complex history and her symptoms patient may benefit from heart catheterization as she has had dissections in the past.  Continue aspirin 81 mg daily and atorvastatin 80 mg nightly.  Continue metoprolol succinate 50 mg daily for rate control and antianginal.  Continue terazosin 2 mg nightly for high blood pressure.  Discussed with the patient that I will contact her back regarding my discussion with Dr. Allan  Continue all other current medications.  F/up in 1 month, sooner if needed.    Kary Jameson PA-C

## 2024-07-29 DIAGNOSIS — I48.0 PAROXYSMAL A-FIB: Primary | ICD-10-CM

## 2024-07-30 ENCOUNTER — TELEPHONE (OUTPATIENT)
Dept: CARDIOLOGY | Facility: CLINIC | Age: 64
End: 2024-07-30
Payer: COMMERCIAL

## 2024-07-30 DIAGNOSIS — I49.3 PVC (PREMATURE VENTRICULAR CONTRACTION): ICD-10-CM

## 2024-07-30 DIAGNOSIS — R00.2 HEART PALPITATIONS: Primary | ICD-10-CM

## 2024-07-30 RX ORDER — SOTALOL HYDROCHLORIDE 80 MG/1
80 TABLET ORAL 2 TIMES DAILY
Qty: 60 TABLET | Refills: 6 | Status: SHIPPED | OUTPATIENT
Start: 2024-07-30

## 2024-08-02 ENCOUNTER — CLINICAL SUPPORT (OUTPATIENT)
Dept: CARDIOLOGY | Facility: CLINIC | Age: 64
End: 2024-08-02
Payer: COMMERCIAL

## 2024-08-02 DIAGNOSIS — I48.0 PAROXYSMAL ATRIAL FIBRILLATION: Primary | ICD-10-CM

## 2024-08-06 ENCOUNTER — CLINICAL SUPPORT (OUTPATIENT)
Dept: CARDIOLOGY | Facility: CLINIC | Age: 64
End: 2024-08-06
Payer: COMMERCIAL

## 2024-08-06 DIAGNOSIS — R00.2 HEART PALPITATIONS: Primary | ICD-10-CM

## 2024-08-06 NOTE — PROGRESS NOTES
Patient came in for EKG.  HR 44 with patient on sotalol 40mg BID.  Discussed with REE Salinas.  Patient to stop sotalol.  I will discuss with [facility] when she returns tomorrow.  Patient verbalizes understanding.

## 2024-08-08 NOTE — PROGRESS NOTES
Discussed with [facility].  Patient to take sotalol 40mg QD.    Spoke with patient.  She is instructed of medication change and need for EKG on Monday.  She verbalizes understanding.

## 2024-08-09 ENCOUNTER — TELEPHONE (OUTPATIENT)
Dept: CARDIOLOGY | Facility: CLINIC | Age: 64
End: 2024-08-09
Payer: COMMERCIAL

## 2024-08-09 DIAGNOSIS — I49.3 PVC (PREMATURE VENTRICULAR CONTRACTION): ICD-10-CM

## 2024-08-09 DIAGNOSIS — R00.2 HEART PALPITATIONS: ICD-10-CM

## 2024-08-09 RX ORDER — SOTALOL HYDROCHLORIDE 80 MG/1
40 TABLET ORAL DAILY
Qty: 45 TABLET | Refills: 0 | Status: SHIPPED | OUTPATIENT
Start: 2024-08-09

## 2024-08-09 NOTE — TELEPHONE ENCOUNTER
[facility] reviewed EKG from 8/6 per [facility]- change sotalol to 40 mg qd, med list updated. Pt notified, verbalized understanding and agreeable to plan

## 2024-08-12 ENCOUNTER — CLINICAL SUPPORT (OUTPATIENT)
Dept: CARDIOLOGY | Facility: CLINIC | Age: 64
End: 2024-08-12
Payer: COMMERCIAL

## 2024-08-12 DIAGNOSIS — R00.2 HEART PALPITATIONS: Primary | ICD-10-CM

## 2024-08-12 DIAGNOSIS — R07.2 PRECORDIAL CHEST PAIN: ICD-10-CM

## 2024-08-14 NOTE — PROGRESS NOTES
EKG reviewed by [facility].  Sinus bradycardia at 54bpm.  Patient continues to feel weird.  She states her heart doesn't feel normal.  Patient to have 7 day holter.

## 2024-09-11 ENCOUNTER — OFFICE VISIT (OUTPATIENT)
Dept: CARDIOLOGY | Facility: CLINIC | Age: 64
End: 2024-09-11
Payer: COMMERCIAL

## 2024-09-11 DIAGNOSIS — I25.42 SPONTANEOUS DISSECTION OF CORONARY ARTERY: Primary | ICD-10-CM

## 2024-09-11 DIAGNOSIS — I10 ESSENTIAL HYPERTENSION: ICD-10-CM

## 2024-09-11 DIAGNOSIS — I48.0 PAROXYSMAL ATRIAL FIBRILLATION: ICD-10-CM

## 2024-09-11 DIAGNOSIS — E78.5 DYSLIPIDEMIA: ICD-10-CM

## 2024-09-13 ENCOUNTER — TELEPHONE (OUTPATIENT)
Dept: CARDIOLOGY | Facility: CLINIC | Age: 64
End: 2024-09-13
Payer: COMMERCIAL

## 2024-09-13 NOTE — TELEPHONE ENCOUNTER
Patient stopped by office on Thursday.  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  Now the area is inflamed and causing her problems.  She has visited Dr. Winters.  He states he feels it is from her betablocker.  Due to the inflammation response, her PCP has ordered allergy testing which was negative.  She would also, like to order a steroid pack.  She wants [facility] approval.  Patient had a CT Head that was negative.  Her PCP has ordered a liver ultrasound of liver and repeated lab work.  The patient wants to know what [facility] thinks about this.  Discussed with [facility].  Everything is OK with her.  At this time, patients holter is with Dr. Morrison.  She wanted him to look at it before discussing with patient.    Spoke with patient.  She is instructed of above and verbalizes understanding.

## 2024-09-18 DIAGNOSIS — R00.2 HEART PALPITATIONS: Primary | ICD-10-CM

## 2024-09-18 DIAGNOSIS — R94.31 ABNORMAL PATIENT-ACTIVATED CARDIAC EVENT MONITOR: ICD-10-CM

## 2024-09-25 DIAGNOSIS — E78.5 DYSLIPIDEMIA: Primary | ICD-10-CM

## 2024-10-16 PROBLEM — I48.0 PAF (PAROXYSMAL ATRIAL FIBRILLATION): Status: ACTIVE | Noted: 2024-10-16

## 2024-10-16 PROBLEM — I47.19 ATRIAL TACHYCARDIA: Status: ACTIVE | Noted: 2024-10-16

## 2024-10-16 PROBLEM — I49.3 PVC'S (PREMATURE VENTRICULAR CONTRACTIONS): Status: ACTIVE | Noted: 2024-10-16

## 2024-10-16 NOTE — PROGRESS NOTES
Electrophysiology Clinic Consult     Carolyn Chaparro  [id number]  1960    Referring Provider: Hellen Allan,*   PCP: Erika Hernandez, PA  [address] / [place] [id number]    Date of Service: 10/17/24    Chief Complaint   Patient presents with    Palpitations    PVC'S     Problem List  Paroxymal atrial fibrillation/frequent PVCs  [id number] 2 (sex, HTN)  Monitor, 12/19/2018: occasional PVC's, rare couplets, rare PAC's, brief atrial tachycardia, 13 beats.  Monitor, 09/20/2019: Occasional PVCs (1.8%), occasional couplets. Brief atach, longest 14 beats.  [facility] ED, 05/14/2023: Atrial fibrillation with RVR  MCOT, 08/08/2023: SB/SR. Occasional PACs/PVCs. Brief atach.  7-day Holter 8/14/2024: Predominantly normal sinus rhythm.  Occasional PACs.  Brief atrial tach.  Very frequent PVCs (22% burden).  Coronary artery dissection  LHC/NSTEMI, 10/02/2018: Spontaneous circumflex dissection with improvement in flow initially following an attempted wiring of the vessel. With ongoing attempts at advancing the wire however the dissection propagated proximally. With the use of Cardene, nitroglycerin, and Integrilin flow was improved. No evidence of LAD or RCA disease.  Echo, 10/02/2018, 07/18/2019, Echo, 10/12/2022:  normal EF with no hemodynamically significant VHD  Echo, 01/31/2024: EF 60%. Trace MR. Trace to mild TR with RVSP 24 mmHg.   NSTEMI/LHC, 01/31/2024: Occluded D1 in a small distal segment, possibly related to SCAD given her history. Improved left circumflex flow, healed from prior angiogram in 2018 (related to SCAD). Otherwise minimal disease a diffusely tortuous right dominant system. Normal LVEDP of 12mmHg.  Hypertension  Hyperlipidemia  Snoring  GERD    History of Present Illness  Carolyn Chaparro is a 64 y.o. female who presents to my electrophysiology clinic for evaluation of frequent PVCs with a history of paroxysmal atrial fibrillation.  Has a history of spontaneous  coronary artery dissection as well as history of paroxysmal atrial fibrillation after her COVID infection and some PVC in the past.  Few months ago she started having a lot of heart rhythm problems.  Initially patient was started on sotalol but her dose has been decreased due to bradycardia-unclear if this was truly symptomatic but she is now currently on sotalol 40 mg once a day.  Patient notes then for the past few weeks her heart rhythm has recovered to normal and has been feeling well.    Review of Systems   Constitutional:  Positive for fatigue. Negative for activity change and fever.   Respiratory:  Negative for chest tightness and shortness of breath.    Cardiovascular:  Positive for palpitations. Negative for chest pain and leg swelling.   Gastrointestinal:  Negative for constipation and diarrhea.   Genitourinary:  Negative for decreased urine volume and difficulty urinating.   Skin:  Negative for wound.   Neurological:  Negative for dizziness, syncope, weakness and light-headedness.   Psychiatric/Behavioral:  Negative for suicidal ideas.        Outpatient Medications Marked as Taking for the 10/17/24 encounter (Office Visit) with Khris Morrison MD   Medication Sig Dispense Refill    aspirin 81 MG EC tablet Take 1 tablet by mouth Daily.      calcium carbonate (OS-MOIZ) 600 MG tablet Take 1 tablet by mouth Daily.      cetirizine (zyrTEC) 10 MG tablet Take 1 tablet by mouth Daily.      cholecalciferol (VITAMIN D3) 1000 units tablet Take 1 tablet by mouth Daily.      ibuprofen (ADVIL,MOTRIN) 200 MG tablet Take 2 tablets by mouth 2 (Two) Times a Day As Needed for Mild Pain (For headaches).      Linzess 145 MCG capsule capsule Take 1 capsule by mouth.      MAGNESIUM GLYCINATE PO Take 200 mg by mouth Daily.      Multiple Vitamins-Minerals (MULTIVITAMIN ADULT PO) Take 1 tablet by mouth Daily.      nitroglycerin (NITROSTAT) 0.4 MG SL tablet 1 under the tongue as needed for angina, may repeat q5mins for up three doses  "25 tablet 11    pantoprazole (PROTONIX) 40 MG EC tablet Take 1 tablet by mouth Daily.      polyethylene glycol (MiraLax) 17 GM/SCOOP powder Take 17 g by mouth Daily.      Probiotic Product (PROBIOTIC ADVANCED PO) Take 1 tablet by mouth Daily.      sotalol (Betapace) 80 MG tablet Take 0.5 tablets by mouth Daily. 45 tablet 0    terazosin (HYTRIN) 2 MG capsule TAKE ONE CAPSULE BY MOUTH ONCE NIGHTLY 90 capsule 3    vitamin B-12 (CYANOCOBALAMIN) 1000 MCG tablet Take 1 tablet by mouth Daily.         Physical Exam  Vitals:    10/17/24 1322   BP: 132/74   BP Location: Left arm   Patient Position: Standing   Cuff Size: Adult   Pulse: 56   Weight: 80.9 kg (178 lb 6.4 oz)   Height: 172.7 cm (68\")     Body mass index is 27.13 kg/m².    Vitals and nursing note reviewed.   Constitutional:       Appearance: Healthy appearance.   HENT:      Head: Normocephalic and atraumatic.      Nose: Nose normal.   Neck:      Vascular: No JVD.   Pulmonary:      Effort: Pulmonary effort is normal.      Breath sounds: Normal breath sounds.   Cardiovascular:      PMI at left midclavicular line. Normal rate. Regular rhythm. Normal S1. Normal S2.       Murmurs: There is no murmur.      No gallop.    Edema:     Peripheral edema absent.   Skin:     General: Skin is warm and dry.   Neurological:      Mental Status: Oriented to person, place and time.   Psychiatric:         Behavior: Behavior normal.          Diagnostic Data    ECG 12 Lead    Date/Time: 10/17/2024 1:59 PM  Performed by: Khris Morrison MD    Authorized by: Khris Morrison MD  Comparison: not compared with previous ECG   Rhythm: sinus bradycardia  Rate: normal  Conduction: conduction normal  QRS axis: normal  Other: no other findings    Clinical impression: normal ECG          Lab Results   Component Value Date    GLUCOSE 125 (H) 02/01/2024    CALCIUM 8.9 02/01/2024     02/01/2024    K 4.0 02/01/2024    CO2 25.0 02/01/2024     02/01/2024    BUN 11 02/01/2024    CREATININE 0.60 " 02/01/2024    EGFRIFNONA 95 02/09/2021    BCR 18.3 02/01/2024    ANIONGAP 10.0 02/01/2024     Lab Results   Component Value Date    WBC 6.97 02/01/2024    HGB 13.6 02/01/2024    HCT 39.0 02/01/2024    MCV 88.0 02/01/2024     02/01/2024     Lab Results   Component Value Date    INR 0.98 01/30/2024    INR 0.97 05/14/2023    PROTIME 13.1 01/30/2024    PROTIME 13.0 05/14/2023     Lab Results   Component Value Date    TSH 2.450 01/30/2024         I personally viewed and interpreted the patient's EKG/Telemetry/lab data    Carolyn Chaparro  reports that she has never smoked. She has been exposed to tobacco smoke. She has never used smokeless tobacco. I have educated her on the risk of diseases from using tobacco products such as cancer, COPD, and heart disease.     I spent 3  minutes counseling the patient.           ACP discussion was declined by the patient. Patient has an advance directive in EMR which is still valid.     Assessment and Plan   Diagnoses and all orders for this visit:    1. PAF (paroxysmal atrial fibrillation) (Primary)    2. Atrial tachycardia    3. PVC's (premature ventricular contractions)    4. Essential hypertension    Other orders  -     ECG 12 Lead        Paroxymal atrial fibrillation / Atrial tachycardia / PVCs  -[id number] 2 (sex, HTN)  -Echo, 1/31/24: EF 60%, trace MR, trace to mild TR  -[facility] ED, 05/14/2023: Atrial fibrillation with RVR  -Monitor, 8/14/2024: Predominantly normal sinus rhythm.  Occasional PACs.  Brief atrial tach.  Very frequent PVCs (22% burden).  -Doing well on sotalol 40mg po qday  -discussed her future options of dofetilide with inpatient loading vs possible abltion; given that she is doing well, would recommend staying on current regimen.   -follow up in 6 months    SCAD   - overall doing well    Hypertension  -Well controlled, continue current medication regimen          Follow Up  Return in about 6 months (around 4/17/2025).      Thank you for allowing me  to participate in the care of your patient. Please to not hesitate to contact me with additional questions or concerns.        Khris Morrison MD [facility]  Cardiac Electrophysiologist  [facility] / [facility]

## 2024-10-17 ENCOUNTER — OFFICE VISIT (OUTPATIENT)
Dept: CARDIOLOGY | Facility: CLINIC | Age: 64
End: 2024-10-17
Payer: COMMERCIAL

## 2024-10-17 VITALS
DIASTOLIC BLOOD PRESSURE: 74 MMHG | HEART RATE: 56 BPM | SYSTOLIC BLOOD PRESSURE: 132 MMHG | HEIGHT: 68 IN | WEIGHT: 178.4 LBS | BODY MASS INDEX: 27.04 KG/M2

## 2024-10-17 DIAGNOSIS — I47.19 ATRIAL TACHYCARDIA: Chronic | ICD-10-CM

## 2024-10-17 DIAGNOSIS — I48.0 PAF (PAROXYSMAL ATRIAL FIBRILLATION): Primary | Chronic | ICD-10-CM

## 2024-10-17 DIAGNOSIS — I10 ESSENTIAL HYPERTENSION: Chronic | ICD-10-CM

## 2024-10-17 DIAGNOSIS — I49.3 PVC'S (PREMATURE VENTRICULAR CONTRACTIONS): Chronic | ICD-10-CM

## 2024-10-17 RX ORDER — LINACLOTIDE 145 UG/1
145 CAPSULE, GELATIN COATED ORAL
COMMUNITY
Start: 2024-08-05

## 2024-10-24 ENCOUNTER — LAB (OUTPATIENT)
Dept: LAB | Facility: HOSPITAL | Age: 64
End: 2024-10-24
Payer: COMMERCIAL

## 2024-10-24 DIAGNOSIS — E78.5 DYSLIPIDEMIA: ICD-10-CM

## 2024-10-24 LAB
ALBUMIN SERPL-MCNC: 4 G/DL (ref 3.5–5.2)
ALP SERPL-CCNC: 150 U/L (ref 39–117)
ALT SERPL W P-5'-P-CCNC: 69 U/L (ref 1–33)
AST SERPL-CCNC: 54 U/L (ref 1–32)
BILIRUB CONJ SERPL-MCNC: <0.2 MG/DL (ref 0–0.3)
BILIRUB INDIRECT SERPL-MCNC: ABNORMAL MG/DL
BILIRUB SERPL-MCNC: 0.4 MG/DL (ref 0–1.2)
PROT SERPL-MCNC: 7 G/DL (ref 6–8.5)

## 2024-10-24 PROCEDURE — 80076 HEPATIC FUNCTION PANEL: CPT

## 2024-10-24 PROCEDURE — 36415 COLL VENOUS BLD VENIPUNCTURE: CPT

## 2024-10-25 DIAGNOSIS — E78.5 DYSLIPIDEMIA: Primary | ICD-10-CM

## 2024-12-02 ENCOUNTER — LAB (OUTPATIENT)
Dept: LAB | Facility: HOSPITAL | Age: 64
End: 2024-12-02
Payer: COMMERCIAL

## 2024-12-02 DIAGNOSIS — E78.5 DYSLIPIDEMIA: ICD-10-CM

## 2024-12-02 LAB
ALBUMIN SERPL-MCNC: 4.4 G/DL (ref 3.5–5.2)
ALP SERPL-CCNC: 109 U/L (ref 39–117)
ALT SERPL W P-5'-P-CCNC: 48 U/L (ref 1–33)
AST SERPL-CCNC: 46 U/L (ref 1–32)
BILIRUB CONJ SERPL-MCNC: <0.2 MG/DL (ref 0–0.3)
BILIRUB INDIRECT SERPL-MCNC: ABNORMAL MG/DL
BILIRUB SERPL-MCNC: 0.4 MG/DL (ref 0–1.2)
PROT SERPL-MCNC: 7 G/DL (ref 6–8.5)

## 2024-12-02 PROCEDURE — 80076 HEPATIC FUNCTION PANEL: CPT

## 2024-12-02 PROCEDURE — 36415 COLL VENOUS BLD VENIPUNCTURE: CPT

## 2024-12-03 ENCOUNTER — TELEPHONE (OUTPATIENT)
Dept: CARDIOLOGY | Facility: CLINIC | Age: 64
End: 2024-12-03
Payer: COMMERCIAL

## 2024-12-03 DIAGNOSIS — I25.42 SPONTANEOUS DISSECTION OF CORONARY ARTERY: ICD-10-CM

## 2024-12-03 DIAGNOSIS — E78.5 DYSLIPIDEMIA: Primary | ICD-10-CM

## 2024-12-03 NOTE — TELEPHONE ENCOUNTER
Patient left voice mail message.  She would like to know if [facility] wants to order a repeat CT angio chest.  Discussed with [facility].  We can wait until 2026 for scan.  Patient will need a follow up in the next few months.  Dr. Morrison wants her to continue the sotalol 40mg daily.    Spoke with patient.  She is instructed as above and verbalizes understanding.

## 2025-01-14 ENCOUNTER — LAB (OUTPATIENT)
Dept: LAB | Facility: HOSPITAL | Age: 65
End: 2025-01-14
Payer: COMMERCIAL

## 2025-01-14 DIAGNOSIS — I25.42 SPONTANEOUS DISSECTION OF CORONARY ARTERY: ICD-10-CM

## 2025-01-14 DIAGNOSIS — E78.5 DYSLIPIDEMIA: ICD-10-CM

## 2025-01-14 LAB
ALBUMIN SERPL-MCNC: 4.5 G/DL (ref 3.5–5.2)
ALP SERPL-CCNC: 93 U/L (ref 39–117)
ALT SERPL W P-5'-P-CCNC: 33 U/L (ref 1–33)
AST SERPL-CCNC: 34 U/L (ref 1–32)
BILIRUB CONJ SERPL-MCNC: <0.2 MG/DL (ref 0–0.3)
BILIRUB INDIRECT SERPL-MCNC: ABNORMAL MG/DL
BILIRUB SERPL-MCNC: 0.4 MG/DL (ref 0–1.2)
PROT SERPL-MCNC: 7.5 G/DL (ref 6–8.5)

## 2025-01-14 PROCEDURE — 36415 COLL VENOUS BLD VENIPUNCTURE: CPT

## 2025-01-14 PROCEDURE — 80076 HEPATIC FUNCTION PANEL: CPT

## 2025-01-14 PROCEDURE — 80061 LIPID PANEL: CPT | Performed by: INTERNAL MEDICINE

## 2025-01-15 DIAGNOSIS — I25.10 MILD CAD: Primary | ICD-10-CM

## 2025-01-15 LAB
CHOLEST SERPL-MCNC: 186 MG/DL (ref 0–200)
HDLC SERPL-MCNC: 53 MG/DL (ref 40–60)
LDLC SERPL CALC-MCNC: 121 MG/DL (ref 0–100)
LDLC/HDLC SERPL: 2.27 {RATIO}
TRIGL SERPL-MCNC: 64 MG/DL (ref 0–150)
VLDLC SERPL-MCNC: 12 MG/DL (ref 5–40)

## 2025-01-15 RX ORDER — ROSUVASTATIN CALCIUM 40 MG/1
40 TABLET, COATED ORAL DAILY
Qty: 30 TABLET | Refills: 11 | Status: SHIPPED | OUTPATIENT
Start: 2025-01-15

## 2025-01-20 NOTE — PROGRESS NOTES
[facility]    Patient ID: Carolyn Chaparro is a 64 y.o. female.  : 1960   Contact: [phone]    Encounter date: 2025    PCP: Erika Hernandez PA      Chief complaint:   Chief Complaint   Patient presents with    Spontaneous dissection of coronary artery       Problem List:  Coronary artery dissection:  C/NSTEMI, 10/02/2018: Spontaneous circumflex dissection with improvement in flow initially following an attempted wiring of the vessel. With ongoing attempts at advancing the wire however the dissection propagated proximally. With the use of Cardene, nitroglycerin, and Integrilin flow was improved. No evidence of LAD or RCA disease.  Echo, 10/02/2018: EF 60%. Trace Mr and TR.  24h Holter, 2018: occasional PVC's, rare couplets, rare PAC's, brief atrial tachycardia, 13 beats.  Echo, 2019: EF 60%. LV systolic function is normal. No wall motion normalities are identified.  Echo, 10/12/2022: EF 55%. Trace to mild MR/TR. Trace aortic insufficiency. RVSP 7 mmHg.  [facility] admission for Non-STEMI, 2024.  Echo, 2024: EF 60%. Trace MR. Trace to mild TR with RVSP 24 mmHg.   [facility], 2024: Occluded D1 in a small distal segment, possibly related to SCAD given her history. Improved left circumflex flow, healed from prior angiogram in 2018 (related to SCAD). Otherwise minimal disease a diffusely tortuous right dominant system. Normal LVEDP of 12mmHg.  Paroxymal atrial fibrillation  [facility] ED, 2023: Atrial fibrillation with RVR  CHADS-VASc=2 (female, HTN)  OT, 2023: SB/SR. Occasional PACs/PVCs. Brief atach.  Palpitations:  2 week monitor, 2019: Occasional PVCs (1.8%), occasional couplets. Brief atach, longest 14 beats.  1 week Holter, 2024; The predominant rhythm was normal sinus rhythm. Sinus bradycardia and sinus tachycardia noted. Occasional PACs. Brief atrial tachycardia. Very frequent PVCs at 22% of all beats (2  morphologies, but 1 dominant morphology noted). EP follow up to discuss tikosyn vs ablation, if symptomatic.   Hypertension  Hyperlipidemia  Elevated LFTs/fatty liver  Snoring  GERD    Allergies   Allergen Reactions    Erythromycin Diarrhea    Bisoprolol Other (See Comments)     Ineffective in controlling palps       Current Medications:    Current Outpatient Medications:     aspirin 81 MG EC tablet, Take 1 tablet by mouth Daily., Disp: , Rfl:     calcium carbonate (OS-MOIZ) 600 MG tablet, Take 1 tablet by mouth Daily., Disp: , Rfl:     cetirizine (zyrTEC) 10 MG tablet, Take 1 tablet by mouth Daily., Disp: , Rfl:     cholecalciferol (VITAMIN D3) 1000 units tablet, Take 1 tablet by mouth Daily., Disp: , Rfl:     ibuprofen (ADVIL,MOTRIN) 200 MG tablet, Take 2 tablets by mouth 2 (Two) Times a Day As Needed for Mild Pain (For headaches)., Disp: , Rfl:     Linzess 145 MCG capsule capsule, Take 1 capsule by mouth., Disp: , Rfl:     MAGNESIUM GLYCINATE PO, Take 200 mg by mouth Daily., Disp: , Rfl:     Multiple Vitamins-Minerals (MULTIVITAMIN ADULT PO), Take 1 tablet by mouth Daily., Disp: , Rfl:     nitroglycerin (NITROSTAT) 0.4 MG SL tablet, 1 under the tongue as needed for angina, may repeat q5mins for up three doses, Disp: 25 tablet, Rfl: 11    NON FORMULARY, C pap nightly, Disp: , Rfl:     pantoprazole (PROTONIX) 40 MG EC tablet, Take 1 tablet by mouth Daily., Disp: , Rfl:     polyethylene glycol (MiraLax) 17 GM/SCOOP powder, Take 17 g by mouth Daily., Disp: , Rfl:     Probiotic Product (PROBIOTIC ADVANCED PO), Take 1 tablet by mouth Daily., Disp: , Rfl:     rosuvastatin (CRESTOR) 40 MG tablet, Take 1 tablet by mouth Daily., Disp: 30 tablet, Rfl: 11    sotalol (Betapace) 80 MG tablet, Take 0.5 tablets by mouth Daily., Disp: 45 tablet, Rfl: 0    terazosin (HYTRIN) 2 MG capsule, TAKE ONE CAPSULE BY MOUTH ONCE NIGHTLY, Disp: 90 capsule, Rfl: 3    vitamin B-12 (CYANOCOBALAMIN) 1000 MCG tablet, Take 1 tablet by mouth Daily.,  "Disp: , Rfl:     HPI    Carolyn Chaparro is a 64 y.o. female who presents today for a follow-up for history of spontaneous coronary artery dissection, PAF, and cardiac risk factors. Since last visit, Carolyn has done well from a cardiac standpoint.  She has not had any further chest pain like she had with her last dissection.  She has been diagnosed with a fatty liver and she has been watching her diet much more carefully.  She does not drink alcohol and has tried to cut a lot of the fat out of her diet.  She states that her blood pressure at home is running in the 130 mm range.  She has not had a recent hemoglobin A1c.  She follows with Dr. Damaris Yin at [facility].  She recently started rosuvastatin 40 mg daily and is due to have her LFTs checked in 1 month.  She is working out on the treadmill while her  works out on the [employer] machine so she is getting routine exercise.    The following portions of the patient's history were reviewed and updated as appropriate: allergies, current medications and problem list.    Pertinent positives as listed in the HPI.  All other systems reviewed are negative.         Vitals:    01/29/25 0958   BP: 118/62   BP Location: Left arm   Patient Position: Sitting   Pulse: 64   SpO2: 98%   Weight: 76.2 kg (168 lb)   Height: 172.7 cm (68\")       Physical Exam:  General: Alert and oriented.  Neck: Jugular venous pressure is within normal limits. Carotids have normal upstrokes without bruits.   Cardiovascular: Heart has a nondisplaced focal PMI. Regular rate and rhythm. No murmur, gallop or rub.  Lungs: Clear, no rales or wheezes. Equal expansion is noted.   Extremities: Show no edema.  Skin: Warm and dry.  Neurologic: Nonfocal.     Diagnostic Data (reviewed with patient):  Lab Results   Component Value Date    GLUCOSE 125 (H) 02/01/2024    BUN 11 02/01/2024    CREATININE 0.60 02/01/2024    EGFR 102.3 02/01/2024    BCR 18.3 02/01/2024     02/01/2024 "    K 4.0 02/01/2024     02/01/2024    CO2 25.0 02/01/2024    CALCIUM 8.9 02/01/2024    ALBUMIN 4.5 01/14/2025    ALKPHOS 93 01/14/2025    AST 34 (H) 01/14/2025    ALT 33 01/14/2025     Lab Results   Component Value Date    CHOL 186 01/14/2025    TRIG 64 01/14/2025    HDL 53 01/14/2025     (H) 01/14/2025      Lab Results   Component Value Date    WBC 6.97 02/01/2024    RBC 4.43 02/01/2024    HGB 13.6 02/01/2024    HCT 39.0 02/01/2024    MCV 88.0 02/01/2024     02/01/2024      Lab Results   Component Value Date    TSH 2.450 01/30/2024        Advance Care Planning   ACP discussion was held with the patient during this visit. Patient has an advance directive in EMR which is still valid.        Procedures      Assessment:    ICD-10-CM ICD-9-CM   1. Spontaneous dissection of coronary artery  I25.42 414.12   2. PAF (paroxysmal atrial fibrillation)  I48.0 427.31   3. Essential hypertension  I10 401.9   4. Dyslipidemia  E78.5 272.4         Plan:  Check hemoglobin A 1C  Repeat hepatic profile in 1 month as she is back on rosuvastatin  Continue on aspirin 81 mg daily for antiplatelet therapy.   Continue on rosuvastatin 40 mg daily for hyperlipidemia.    Continue on sotalol 40 mg daily for rhythm control.    Continue on terazosin 2 mg nightly for hypertension.    Continue all other current medications.  F/up in 6 months, sooner if needed.      Hellen Allan MD, FACC

## 2025-01-29 ENCOUNTER — OFFICE VISIT (OUTPATIENT)
Dept: CARDIOLOGY | Facility: CLINIC | Age: 65
End: 2025-01-29
Payer: COMMERCIAL

## 2025-01-29 ENCOUNTER — LAB (OUTPATIENT)
Dept: LAB | Facility: HOSPITAL | Age: 65
End: 2025-01-29
Payer: COMMERCIAL

## 2025-01-29 ENCOUNTER — HOSPITAL ENCOUNTER (EMERGENCY)
Facility: HOSPITAL | Age: 65
Discharge: HOME | End: 2025-01-29
Attending: EMERGENCY MEDICINE | Admitting: EMERGENCY MEDICINE
Payer: COMMERCIAL

## 2025-01-29 ENCOUNTER — APPOINTMENT (OUTPATIENT)
Dept: CT IMAGING | Facility: HOSPITAL | Age: 65
End: 2025-01-29
Payer: COMMERCIAL

## 2025-01-29 VITALS
DIASTOLIC BLOOD PRESSURE: 75 MMHG | SYSTOLIC BLOOD PRESSURE: 151 MMHG | BODY MASS INDEX: 25.46 KG/M2 | TEMPERATURE: 98.6 F | WEIGHT: 168 LBS | HEART RATE: 55 BPM | RESPIRATION RATE: 14 BRPM | HEIGHT: 68 IN | OXYGEN SATURATION: 97 %

## 2025-01-29 VITALS
OXYGEN SATURATION: 98 % | DIASTOLIC BLOOD PRESSURE: 62 MMHG | BODY MASS INDEX: 25.46 KG/M2 | HEIGHT: 68 IN | SYSTOLIC BLOOD PRESSURE: 118 MMHG | HEART RATE: 64 BPM | WEIGHT: 168 LBS

## 2025-01-29 DIAGNOSIS — S16.1XXA ACUTE CERVICAL MYOFASCIAL STRAIN, INITIAL ENCOUNTER: ICD-10-CM

## 2025-01-29 DIAGNOSIS — I10 ESSENTIAL HYPERTENSION: ICD-10-CM

## 2025-01-29 DIAGNOSIS — I48.0 PAF (PAROXYSMAL ATRIAL FIBRILLATION): ICD-10-CM

## 2025-01-29 DIAGNOSIS — S09.90XA CLOSED HEAD INJURY, INITIAL ENCOUNTER: ICD-10-CM

## 2025-01-29 DIAGNOSIS — E78.5 DYSLIPIDEMIA: ICD-10-CM

## 2025-01-29 DIAGNOSIS — I25.42 SPONTANEOUS DISSECTION OF CORONARY ARTERY: Primary | ICD-10-CM
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
## 2025-01-29 LAB — HBA1C MFR BLD: 5.2 % (ref 4.8–5.6)

## 2025-01-29 PROCEDURE — 72125 CT NECK SPINE W/O DYE: CPT

## 2025-01-29 PROCEDURE — 70450 CT HEAD/BRAIN W/O DYE: CPT

## 2025-01-29 PROCEDURE — 36415 COLL VENOUS BLD VENIPUNCTURE: CPT | Performed by: INTERNAL MEDICINE

## 2025-01-29 PROCEDURE — 99284 EMERGENCY DEPT VISIT MOD MDM: CPT

## 2025-01-29 PROCEDURE — 99214 OFFICE O/P EST MOD 30 MIN: CPT | Performed by: INTERNAL MEDICINE

## 2025-01-29 PROCEDURE — 83036 HEMOGLOBIN GLYCOSYLATED A1C: CPT | Performed by: INTERNAL MEDICINE

## 2025-01-29 RX ORDER — CYCLOBENZAPRINE HCL 10 MG
10 TABLET ORAL 3 TIMES DAILY PRN
Qty: 12 TABLET | Refills: 0 | Status: SHIPPED | OUTPATIENT
Start: 2025-01-29

## 2025-01-29 NOTE — ED PROVIDER NOTES
Subjective   History of Present Illness  64-year-old female presents emergency department today with a headache and neck pain.  She reports that yesterday she was walking slipped on some ice and slipped and fell.  States that her head occiput hit the ground.  She did not lose consciousness.  She had no blood from ears or nose.  She does not take an anticoagulant.  She did not think much about it and states that this morning woke up having fairly severe headache and a lot of neck pain.  Pain does not go down into her arm she has no numbness or tingling.  She was advised by neighbors with physician to probably come to the emergency department to be evaluated.    History provided by:  Patient   used: No    Fall  Mechanism of injury: fall    Injury location:  Head/neck  Head/neck injury location:  Head  Incident location:  Home  Time since incident:  1 day  Arrived directly from scene: no    Fall:     Fall occurred:  Walking    Impact surface:  Ice    Point of impact:  Head  Suspicion of alcohol use: no    Suspicion of drug use: no    Tetanus status:  Up to date  Associated symptoms: headaches and neck pain    Associated symptoms: no abdominal pain, no blindness, no chest pain, no loss of consciousness, no seizures and no vomiting    Risk factors: no anticoagulation therapy, no COPD, no dialysis, no hemophilia, no kidney disease, not pregnant and no steroid use        Review of Systems   Eyes:  Negative for blindness.   Respiratory:  Negative for chest tightness, shortness of breath and wheezing.    Cardiovascular:  Negative for chest pain and palpitations.   Gastrointestinal:  Negative for abdominal pain and vomiting.   Genitourinary:  Negative for dysuria, frequency and urgency.   Musculoskeletal:  Positive for neck pain.   Neurological:  Positive for headaches. Negative for seizures and loss of consciousness.       Past Medical History:   Diagnosis Date    Aneurysm 10/01/18    Ascending Aorta 3.9  "at 1/30/24    Anxiety     Arrhythmia afib after covid    last time was 5/24/23    Arthritis 2011    No problems now.    Atrial fibrillation 05/14/2023    stopped after 90 min. went to ER    Chronic constipation     Dyslipidemia 09/11/2024    GERD (gastroesophageal reflux disease)     Headache     Heart palpitations     Hypertension     Myocardial infarction 10/01/18    Spontaneous dissection    CJ (obstructive sleep apnea) 09/18/2023    Osteopenia     Post-menopause on HRT (hormone replacement therapy)     not currently    PVCs (premature ventricular contractions)     hx    Spontaneous dissection of coronary artery     \"attempted stent, but heart re-routed itself\"        Allergies   Allergen Reactions    Erythromycin Diarrhea    Bisoprolol Other (See Comments)     Ineffective in controlling palps       Past Surgical History:   Procedure Laterality Date    CARDIAC CATHETERIZATION N/A 10/02/2018    Procedure: Left Heart Cath;  Surgeon: Hellen Allan MD;  Location:  [facility];  Service: Cardiology    CARDIAC CATHETERIZATION N/A 1/31/2024    Procedure: Left Heart Cath;  Surgeon: Yo Grewal III, MD;  Location:  [facility];  Service: Cardiology;  Laterality: N/A;    COLONOSCOPY      LAPAROSCOPIC ASSISTED VAGINAL HYSTERECTOMY SALPINGO OOPHORECTOMY Bilateral     LAPAROSCOPY WITH LASER      NASAL POLYP EXCISION  2022    benign-caused nose bleeds    PERINEOPLASTY      POSTERIOR REPAIR      SACROCOLPOPEXY N/A 05/13/2021    Procedure: SACROCOLPOPEXY LAPAROSCOPIC WITH ROBOTIC WITH MID URETHRAL SLING  CYSTOSCOPY POSTERIOR REPAIR;  Surgeon: Thelma Mortensen MD;  Location:  [facility] OR;  Service: Robotics - DaVinci;  Laterality: N/A;    WISDOM TOOTH EXTRACTION         Family History   Problem Relation Age of Onset    Stroke Mother         multiple strokes    Heart disease Mother         quad bypass    Hypertension Mother     Heart failure Father         CHF    Thyroid disease " Sister     Thyroid disease Sister        Social History     Socioeconomic History    Marital status:    Tobacco Use    Smoking status: Never     Passive exposure: Past    Smokeless tobacco: Never   Vaping Use    Vaping status: Never Used   Substance and Sexual Activity    Alcohol use: Never    Drug use: Never    Sexual activity: Yes     Partners: Male     Birth control/protection: Post-menopausal, Hysterectomy           Objective   Physical Exam  Vitals and nursing note reviewed.   Constitutional:       General: She is not in acute distress.     Appearance: She is well-developed. She is not diaphoretic.   HENT:      Head: Normocephalic and atraumatic.      Comments: Hematoma to the occiput.  No open wound.  No crepitus     Nose: Nose normal.   Eyes:      General: No scleral icterus.     Conjunctiva/sclera: Conjunctivae normal.   Cardiovascular:      Rate and Rhythm: Normal rate and regular rhythm.      Heart sounds: Normal heart sounds. No murmur heard.  Pulmonary:      Effort: Pulmonary effort is normal. No respiratory distress.      Breath sounds: Normal breath sounds.   Abdominal:      General: Bowel sounds are normal.      Palpations: Abdomen is soft.      Tenderness: There is no abdominal tenderness.   Musculoskeletal:         General: Normal range of motion.      Cervical back: Normal range of motion and neck supple.   Skin:     General: Skin is warm and dry.   Neurological:      Mental Status: She is alert and oriented to person, place, and time.   Psychiatric:         Behavior: Behavior normal.         Procedures           ED Course  ED Course as of 01/29/25 1611 Wed Jan 29, 2025   1611 We discussed CT of the head and neck was unremarkable.  She will be given medications. [ASAF]      ED Course User Index  [ASAF] Khris Sepulveda PA                                           Recent Results (from the past 24 hours)   Hemoglobin A1c    Collection Time: 01/29/25 11:05 AM    Specimen: Blood   Result  "Value Ref Range    Hemoglobin A1C 5.20 4.80 - 5.60 %     Note: In addition to lab results from this visit, the labs listed above may include labs taken at another facility or during a different encounter within the last 24 hours. Please correlate lab times with ED admission and discharge times for further clarification of the services performed during this visit.    CT Head Without Contrast   Final Result   Impression:   Head:   No acute intracranial abnormality.      Cervical spine:   Negative for cervical spine fracture.                  Electronically Signed: Hank Joseph MD     1/29/2025 3:57 PM EST     Workstation ID: [id number]      CT Cervical Spine Without Contrast   Final Result   Impression:   Head:   No acute intracranial abnormality.      Cervical spine:   Negative for cervical spine fracture.                  Electronically Signed: Hank Joseph MD     1/29/2025 3:57 PM EST     Workstation ID: [id number]        Vitals:    01/29/25 1409   BP: 151/75   Pulse: 55   Resp: 14   Temp: 98.6 °F (37 °C)   SpO2: 97%   Weight: 76.2 kg (168 lb)   Height: 172.7 cm (68\")     Medications - No data to display  ECG/EMG Results (last 24 hours)       ** No results found for the last 24 hours. **          No orders to display                   Medical Decision Making  Problems Addressed:  Acute cervical myofascial strain, initial encounter: complicated acute illness or injury  Closed head injury, initial encounter: complicated acute illness or injury  Fall on ice: complicated acute illness or injury    Amount and/or Complexity of Data Reviewed  Radiology: ordered.    Risk  Prescription drug management.        Final diagnoses:   Fall on ice   Closed head injury, initial encounter   Acute cervical myofascial strain, initial encounter       ED Disposition  ED Disposition       ED Disposition   Discharge    Condition   Stable    Comment   --               Kelsey Yin, DO  [address] [facility] " [id number] [phone]               Medication List        New Prescriptions      cyclobenzaprine 10 MG tablet  Commonly known as: FLEXERIL  Take 1 tablet by mouth 3 (Three) Times a Day As Needed for Muscle Spasms.               Where to Get Your Medications        These medications were sent to [facility] [id number] - [place], [place] - [address] AT [facility] [address] - [phone] PH - [phone] FX  [address], [place] [id number]      Phone: [phone]   cyclobenzaprine 10 MG tablet            Khris Sepulveda PA  01/30/25 1931

## 2025-02-14 ENCOUNTER — LAB (OUTPATIENT)
Dept: LAB | Facility: HOSPITAL | Age: 65
End: 2025-02-14
Payer: COMMERCIAL

## 2025-02-14 DIAGNOSIS — E78.5 DYSLIPIDEMIA: ICD-10-CM

## 2025-02-14 DIAGNOSIS — R74.8 ELEVATED LIVER ENZYMES: ICD-10-CM

## 2025-02-14 DIAGNOSIS — E78.5 DYSLIPIDEMIA: Primary | ICD-10-CM

## 2025-02-14 LAB
ALBUMIN SERPL-MCNC: 4 G/DL (ref 3.5–5.2)
ALP SERPL-CCNC: 83 U/L (ref 39–117)
ALT SERPL W P-5'-P-CCNC: 37 U/L (ref 1–33)
AST SERPL-CCNC: 39 U/L (ref 1–32)
BILIRUB CONJ SERPL-MCNC: 0.1 MG/DL (ref 0–0.3)
BILIRUB INDIRECT SERPL-MCNC: 0.3 MG/DL
BILIRUB SERPL-MCNC: 0.4 MG/DL (ref 0–1.2)
PROT SERPL-MCNC: 7.1 G/DL (ref 6–8.5)

## 2025-02-14 PROCEDURE — 36415 COLL VENOUS BLD VENIPUNCTURE: CPT

## 2025-02-14 PROCEDURE — 80076 HEPATIC FUNCTION PANEL: CPT

## 2025-03-11 ENCOUNTER — TELEPHONE (OUTPATIENT)
Dept: CARDIOLOGY | Facility: CLINIC | Age: 65
End: 2025-03-11

## 2025-03-11 NOTE — TELEPHONE ENCOUNTER
Caller: Carolyn Chaparro     Relationship: SELF    Best call back number: [phone]    What is your medical concern? PT WENT IN AFIB ALMOST 2 HOURS AGO. HER HEART RATE IS STAYING AROUND 160. SHE IS VERY CONCERNED. HUB TO WARM TRANSFER.    How long has this issue been going on? 2 HOURS

## 2025-03-11 NOTE — TELEPHONE ENCOUNTER
I called to f/u with the patient. She states that she went into Afib at 6:50 this morning. She said that the episode lasted 2 hours and 20 minutes. She was asymptomatic but could feel her heart racing.    She states that she has not had any episodes in 2 years.    She did have the stomach flu 2 weeks ago and has been very dehydrated.    She states that she feels fine now. She has been  taking all of her medications as prescribed. Her BP is currently 116/78 and her HR is 56 bpm.     She would like to know if her medications need to be adjusted or if you have any other recommendations?

## 2025-03-12 NOTE — TELEPHONE ENCOUNTER
Patient states that she has not had any more episodes of Afib. She would like to continue to monitor things for now.

## 2025-04-09 ENCOUNTER — APPOINTMENT (OUTPATIENT)
Dept: GENERAL RADIOLOGY | Facility: HOSPITAL | Age: 65
End: 2025-04-09
Payer: COMMERCIAL

## 2025-04-09 ENCOUNTER — APPOINTMENT (OUTPATIENT)
Dept: CT IMAGING | Facility: HOSPITAL | Age: 65
End: 2025-04-09
Payer: COMMERCIAL

## 2025-04-09 ENCOUNTER — HOSPITAL ENCOUNTER (EMERGENCY)
Facility: HOSPITAL | Age: 65
Discharge: HOME | End: 2025-04-09
Attending: EMERGENCY MEDICINE | Admitting: EMERGENCY MEDICINE
Payer: COMMERCIAL

## 2025-04-09 VITALS
TEMPERATURE: 98 F | DIASTOLIC BLOOD PRESSURE: 77 MMHG | RESPIRATION RATE: 20 BRPM | BODY MASS INDEX: 24.55 KG/M2 | HEART RATE: 57 BPM | OXYGEN SATURATION: 99 % | WEIGHT: 162 LBS | SYSTOLIC BLOOD PRESSURE: 139 MMHG | HEIGHT: 68 IN

## 2025-04-09 DIAGNOSIS — R00.2 PALPITATIONS: ICD-10-CM

## 2025-04-09 DIAGNOSIS — I10 ESSENTIAL HYPERTENSION: ICD-10-CM

## 2025-04-09 DIAGNOSIS — I71.21 ANEURYSM OF ASCENDING AORTA WITHOUT RUPTURE: ICD-10-CM

## 2025-04-09 DIAGNOSIS — I48.0 PAROXYSMAL ATRIAL FIBRILLATION: Primary | ICD-10-CM

## 2025-04-09 LAB
ALBUMIN SERPL-MCNC: 4.4 G/DL (ref 3.5–5.2)
ALBUMIN/GLOB SERPL: 1.6 G/DL
ALP SERPL-CCNC: 96 U/L (ref 39–117)
ALT SERPL W P-5'-P-CCNC: 38 U/L (ref 1–33)
ANION GAP SERPL CALCULATED.3IONS-SCNC: 11 MMOL/L (ref 5–15)
AST SERPL-CCNC: 40 U/L (ref 1–32)
BASOPHILS # BLD AUTO: 0.06 10*3/MM3 (ref 0–0.2)
BASOPHILS NFR BLD AUTO: 1 % (ref 0–1.5)
BILIRUB SERPL-MCNC: 0.3 MG/DL (ref 0–1.2)
BUN SERPL-MCNC: 21 MG/DL (ref 8–23)
BUN/CREAT SERPL: 30.9 (ref 7–25)
CALCIUM SPEC-SCNC: 9.5 MG/DL (ref 8.6–10.5)
CHLORIDE SERPL-SCNC: 108 MMOL/L (ref 98–107)
CO2 SERPL-SCNC: 24 MMOL/L (ref 22–29)
CREAT SERPL-MCNC: 0.68 MG/DL (ref 0.57–1)
DEPRECATED RDW RBC AUTO: 39.5 FL (ref 37–54)
EGFRCR SERPLBLD CKD-EPI 2021: 98.6 ML/MIN/1.73
EOSINOPHIL # BLD AUTO: 0.26 10*3/MM3 (ref 0–0.4)
EOSINOPHIL NFR BLD AUTO: 4.1 % (ref 0.3–6.2)
ERYTHROCYTE [DISTWIDTH] IN BLOOD BY AUTOMATED COUNT: 11.9 % (ref 12.3–15.4)
GLOBULIN UR ELPH-MCNC: 2.7 GM/DL
GLUCOSE SERPL-MCNC: 122 MG/DL (ref 65–99)
HCT VFR BLD AUTO: 41.3 % (ref 34–46.6)
HGB BLD-MCNC: 13.9 G/DL (ref 12–15.9)
HOLD SPECIMEN: NORMAL
IMM GRANULOCYTES # BLD AUTO: 0.01 10*3/MM3 (ref 0–0.05)
IMM GRANULOCYTES NFR BLD AUTO: 0.2 % (ref 0–0.5)
LYMPHOCYTES # BLD AUTO: 1.58 10*3/MM3 (ref 0.7–3.1)
LYMPHOCYTES NFR BLD AUTO: 25.1 % (ref 19.6–45.3)
MAGNESIUM SERPL-MCNC: 2.3 MG/DL (ref 1.6–2.4)
MCH RBC QN AUTO: 30.5 PG (ref 26.6–33)
MCHC RBC AUTO-ENTMCNC: 33.7 G/DL (ref 31.5–35.7)
MCV RBC AUTO: 90.8 FL (ref 79–97)
MONOCYTES # BLD AUTO: 0.69 10*3/MM3 (ref 0.1–0.9)
MONOCYTES NFR BLD AUTO: 11 % (ref 5–12)
NEUTROPHILS NFR BLD AUTO: 3.69 10*3/MM3 (ref 1.7–7)
NEUTROPHILS NFR BLD AUTO: 58.6 % (ref 42.7–76)
NRBC BLD AUTO-RTO: 0 /100 WBC (ref 0–0.2)
NT-PROBNP SERPL-MCNC: 387.7 PG/ML (ref 0–900)
PLATELET # BLD AUTO: 211 10*3/MM3 (ref 140–450)
PMV BLD AUTO: 10.3 FL (ref 6–12)
POTASSIUM SERPL-SCNC: 4.1 MMOL/L (ref 3.5–5.2)
PROT SERPL-MCNC: 7.1 G/DL (ref 6–8.5)
RBC # BLD AUTO: 4.55 10*6/MM3 (ref 3.77–5.28)
SODIUM SERPL-SCNC: 143 MMOL/L (ref 136–145)
T4 FREE SERPL-MCNC: 1.24 NG/DL (ref 0.92–1.68)
TROPONIN T SERPL HS-MCNC: 13 NG/L
TSH SERPL DL<=0.05 MIU/L-ACNC: 5.46 UIU/ML (ref 0.27–4.2)
WBC NRBC COR # BLD AUTO: 6.29 10*3/MM3 (ref 3.4–10.8)
WHOLE BLOOD HOLD COAG: NORMAL
WHOLE BLOOD HOLD SPECIMEN: NORMAL

## 2025-04-09 PROCEDURE — 99285 EMERGENCY DEPT VISIT HI MDM: CPT

## 2025-04-09 PROCEDURE — 25510000001 IOPAMIDOL PER 1 ML: Performed by: EMERGENCY MEDICINE

## 2025-04-09 PROCEDURE — 83880 ASSAY OF NATRIURETIC PEPTIDE: CPT | Performed by: EMERGENCY MEDICINE

## 2025-04-09 PROCEDURE — 93005 ELECTROCARDIOGRAM TRACING: CPT

## 2025-04-09 PROCEDURE — 80050 GENERAL HEALTH PANEL: CPT | Performed by: EMERGENCY MEDICINE

## 2025-04-09 PROCEDURE — 84439 ASSAY OF FREE THYROXINE: CPT | Performed by: EMERGENCY MEDICINE

## 2025-04-09 PROCEDURE — 84484 ASSAY OF TROPONIN QUANT: CPT | Performed by: EMERGENCY MEDICINE

## 2025-04-09 PROCEDURE — 83735 ASSAY OF MAGNESIUM: CPT | Performed by: EMERGENCY MEDICINE

## 2025-04-09 PROCEDURE — 93005 ELECTROCARDIOGRAM TRACING: CPT | Performed by: EMERGENCY MEDICINE

## 2025-04-09 PROCEDURE — 71275 CT ANGIOGRAPHY CHEST: CPT

## 2025-04-09 PROCEDURE — 71045 X-RAY EXAM CHEST 1 VIEW: CPT

## 2025-04-09 RX ORDER — SODIUM CHLORIDE 0.9 % (FLUSH) 0.9 %
10 SYRINGE (ML) INJECTION AS NEEDED
Status: DISCONTINUED | OUTPATIENT
Start: 2025-04-09 | End: 2025-04-09 | Stop reason: HOSPADM

## 2025-04-09 RX ORDER — IOPAMIDOL 755 MG/ML
100 INJECTION, SOLUTION INTRAVASCULAR
Status: COMPLETED | OUTPATIENT
Start: 2025-04-09 | End: 2025-04-09

## 2025-04-09 RX ADMIN — IOPAMIDOL 75 ML: 755 INJECTION, SOLUTION INTRAVENOUS at 03:32

## 2025-04-09 NOTE — ED PROVIDER NOTES
Subjective   History of Present Illness  This is a 64-year-old female with past medical history of paroxysmal atrial fibrillation presenting to the emergency department some palpitations.  Patient states that she has been traveling recently down to [place].  She was under a lot of stress after some missed flights.  When she got back home she was going to sleep.  She woke up about an hour prior to arrival with some fluttering in her chest.  She states that she waited about 60 minutes to see if it would go away, however it persisted.  She just felt like her heart was racing.  She was not having associated chest pain or shortness of breath.  No hemoptysis.  Denies any fevers or chills.  No headache or change in vision.  No focal weakness.  No abdominal pain or vomiting    History provided by:  Patient   used: No        Review of Systems   Constitutional:  Negative for chills and fever.   HENT:  Negative for congestion, ear pain and sore throat.    Eyes:  Negative for visual disturbance.   Respiratory:  Negative for shortness of breath.    Cardiovascular:  Positive for palpitations. Negative for chest pain.   Gastrointestinal:  Negative for abdominal pain.   Genitourinary:  Negative for difficulty urinating.   Musculoskeletal:  Negative for arthralgias.   Skin:  Negative for rash.   Neurological:  Negative for dizziness, weakness and numbness.   Psychiatric/Behavioral:  Negative for agitation.        Past Medical History:   Diagnosis Date    Aneurysm 10/01/18    Ascending Aorta 3.9 at 1/30/24    Anxiety     Arrhythmia afib after covid    last time was 5/24/23    Arthritis 2011    No problems now.    Atrial fibrillation 05/14/2023    stopped after 90 min. went to ER    Chronic constipation     Dyslipidemia 09/11/2024    GERD (gastroesophageal reflux disease)     Headache     Heart palpitations     Hypertension     Myocardial infarction 10/01/18    Spontaneous dissection    CJ (obstructive sleep  "apnea) 09/18/2023    Osteopenia     Post-menopause on HRT (hormone replacement therapy)     not currently    PVCs (premature ventricular contractions)     hx    Spontaneous dissection of coronary artery     \"attempted stent, but heart re-routed itself\"        Allergies   Allergen Reactions    Erythromycin Diarrhea    Bisoprolol Other (See Comments)     Ineffective in controlling palps       Past Surgical History:   Procedure Laterality Date    CARDIAC CATHETERIZATION N/A 10/02/2018    Procedure: Left Heart Cath;  Surgeon: Hellen Allan MD;  Location:  [facility];  Service: Cardiology    CARDIAC CATHETERIZATION N/A 1/31/2024    Procedure: Left Heart Cath;  Surgeon: Yo Grewal III, MD;  Location:  [facility];  Service: Cardiology;  Laterality: N/A;    COLONOSCOPY      LAPAROSCOPIC ASSISTED VAGINAL HYSTERECTOMY SALPINGO OOPHORECTOMY Bilateral     LAPAROSCOPY WITH LASER      NASAL POLYP EXCISION  2022    benign-caused nose bleeds    PERINEOPLASTY      POSTERIOR REPAIR      SACROCOLPOPEXY N/A 05/13/2021    Procedure: SACROCOLPOPEXY LAPAROSCOPIC WITH ROBOTIC WITH MID URETHRAL SLING  CYSTOSCOPY POSTERIOR REPAIR;  Surgeon: Thelma Mortensen MD;  Location: [facility] OR;  Service: Robotics - DaVinci;  Laterality: N/A;    WISDOM TOOTH EXTRACTION         Family History   Problem Relation Age of Onset    Stroke Mother         multiple strokes    Heart disease Mother         quad bypass    Hypertension Mother     Heart failure Father         CHF    Thyroid disease Sister     Thyroid disease Sister        Social History     Socioeconomic History    Marital status:    Tobacco Use    Smoking status: Never     Passive exposure: Past    Smokeless tobacco: Never   Vaping Use    Vaping status: Never Used   Substance and Sexual Activity    Alcohol use: Never    Drug use: Never    Sexual activity: Yes     Partners: Male     Birth control/protection: Post-menopausal, Hysterectomy "           Objective   Physical Exam  Vitals and nursing note reviewed.   Constitutional:       General: She is not in acute distress.     Appearance: She is not ill-appearing or toxic-appearing.   Eyes:      Conjunctiva/sclera: Conjunctivae normal.      Pupils: Pupils are equal, round, and reactive to light.   Cardiovascular:      Rate and Rhythm: Normal rate and regular rhythm.   Pulmonary:      Effort: Pulmonary effort is normal. No respiratory distress.   Abdominal:      General: Abdomen is flat. There is no distension.      Palpations: There is no mass.      Tenderness: There is no abdominal tenderness. There is no guarding or rebound.   Musculoskeletal:         General: No deformity. Normal range of motion.   Skin:     General: Skin is warm.      Findings: No rash.   Neurological:      General: No focal deficit present.      Mental Status: She is alert and oriented to person, place, and time.      Motor: No weakness.         ECG 12 Lead      Date/Time: 4/9/2025 2:54 AM    Performed by: Yuri Rueda MD  Authorized by: Yuri Rueda MD  Interpreted by ED physician  Comparison: compared with previous ECG   Similar to previous ECG  Rhythm: sinus rhythm  Rate: normal  BPM: 74  QRS axis: normal  Conduction: conduction normal  Other findings comments: Nonspecific ST changes  Clinical impression: non-specific ECG  Comments: Interpretation:  EKG was directly visualized by myself, interpretations as documented in hospital course.    ECG 12 Lead      Date/Time: 4/9/2025 4:26 AM    Performed by: Yuri Rudea MD  Authorized by: Yuri Rueda MD  Interpreted by ED physician  Comparison: compared with previous ECG   Similar to previous ECG  Rhythm: sinus bradycardia  Rate: bradycardic  BPM: 56  QRS axis: normal  Conduction: conduction normal  Other findings comments: Nonspecific ST changes  Clinical impression: non-specific ECG  Comments: Interpretation:  EKG was directly visualized by myself,  interpretations as documented in hospital course.               ED Course  ED Course as of 04/09/25 0437 Wed Apr 09, 2025 0428 BP: 120/66 [JK]   0428 Temp: 98 °F (36.7 °C) [JK]   0428 Temp src: Oral [JK]   0428 Heart Rate(!): 150 [JK]   0428 Resp: 20 [JK]   0428 SpO2: 97 % [JK]   0428 Device (Oxygen Therapy): room air  Interpretation:  Patient's repeat vitals, telemetry tracing, and pulse oximetry tracing were directly viewed and interpreted by myself.   O2 sat 97% on room air, interpreted as normal.  Telemetry rhythm strip revealed a rate of 150 bpm, interpreted as atrial fibrillation with rapid ventricular rate [JK]   0428 Comprehensive Metabolic Panel(!) [JK]   0429 BNP [JK]   0429 High Sensitivity Troponin T [JK]   0429 Magnesium [JK]   0429 TSH Rfx On Abnormal To Free T4(!) [JK]   0429 CBC & Differential(!) [JK]   0429 Interpretation:  Laboratory studies were reviewed and interpreted directly by myself.  CMP showed some mild elevation in ALT at 38 and AST of 40, TSH was slightly elevated at 5.4, CBC normal, troponin normal, BNP normal [JK]   0429 CT Angiogram Chest [JK]   0429 XR Chest 1 View  Interpretation:  Imaging was directly visualized by myself, per my interpretations, chest x-ray was unremarkable.  CT angiogram of the chest showed some chronic aneurysm of the thoracic aorta, otherwise normal. [JK]   0435 On reevaluation, the patient has remained in sinus rhythm.  She is feeling much better.  Symptoms consistent with paroxysmal atrial fibrillation.  Patient will continue her normal medication regimen.  Follow-up with primary cardiologist.  Given strict return precautions.  Verbalized understanding [JK]   0435 I had a discussion with the patient/family regarding diagnosis, diagnostic results, treatment plan, and medications. The patient/family indicated understanding of these instructions. I spent adequate time at the bedside prior to discharge necessary to discuss the aftercare instructions, giving  patient education, providing explanations of the results of our evaluations/findings, and my decision making to assure that the patient/family understand the plan of care. Time was allotted to answer questions at that time and throughout the ED course. Patient is required to maintain timely follow up, as discussed. I also discussed the potential for the development of an acute emergent condition requiring further evaluation, return to the ER, admission, or even surgical intervention.  I encouraged the patient to return to the emergency department immediately for any concerns, worsening symptoms, new complaints, or if symptoms persist and they are unable to seek follow-up in a timely fashion. The patient/family expressed understanding and agreement with this plan    Shared decision making:   After full review of the patient's clinical presentation, review of any work-up including but not limited to laboratory studies and radiology obtained, I had a discussion with the patient.  Treatment options were discussed as well as the risks, benefits and consequences.  I discussed all findings with the patient and family members if available.  During the discussion, treatment goals were understood by all as well as any misconceptions which were addressed with the patient.  Ample time was given for any questions they may have had.  They are in agreement with the treatment plan as well as final disposition. [JK]      ED Course User Index  [JK] Yuri Rueda MD                                                       Medical Decision Making  This is a 64-year-old female with a history of atrial fibrillation presenting to the emergency department with palpitations.  Apparently the patient presented in [facility] with atrial fibrillation and rapid ventricular rate.  However upon obtaining vital signs the patient spontaneously converted.  She appears to be in normal sinus rhythm at this time.  Given the patient's recent travel  and history of aneurysm and A-fib I am concerned for possible PE or aortic dissection.  I do believe the patient would benefit from CT angiogram of the chest overall, the patient is nontoxic.  Afebrile.  IV access was established in the patient.  Placed on continuous telemetry monitoring.  Given the patient's presentation, differential is broad and will require further evaluation.  Workup initiated.      Differential diagnosis: CAD, ACS, peptic ulcer disease, dyspepsia, pneumonia, bronchitis, atypical chest pain, angina, musculoskeletal pain, dysrhythmia, palpitations      Amount and/or Complexity of Data Reviewed  External Data Reviewed: labs, radiology, ECG and notes.     Details: External laboratories, imaging as well as notes were reviewed personally by myself.  All relevant studies were used to guide decision making.     Date of previous record: 1/29/2025    Source of note: Cardiology    Summary:  Patient was seen and evaluated for routine visit.  I did review basic laboratory studies on file as well as a previous chest x-ray and EKG.  Records reviewed    Labs: ordered. Decision-making details documented in ED Course.  Radiology: ordered and independent interpretation performed. Decision-making details documented in ED Course.  ECG/medicine tests: ordered and independent interpretation performed. Decision-making details documented in ED Course.    Risk  Prescription drug management.        Final diagnoses:   Paroxysmal atrial fibrillation   Palpitations   Aneurysm of ascending aorta without rupture   Essential hypertension       ED Disposition  ED Disposition       ED Disposition   Discharge    Condition   Stable    Comment   --               Hellen Allan MD  [address] [facility]  [address] [place] [id number] [phone]    Call in 1 day           Medication List      No changes were made to your prescriptions during this visit.            Yuri Rueda MD  04/09/25 [id number]

## 2025-04-10 LAB
QT INTERVAL: 390 MS
QT INTERVAL: 456 MS
QTC INTERVAL: 432 MS
QTC INTERVAL: 440 MS

## 2025-04-17 ENCOUNTER — OFFICE VISIT (OUTPATIENT)
Dept: CARDIOLOGY | Facility: CLINIC | Age: 65
End: 2025-04-17
Payer: COMMERCIAL

## 2025-04-17 VITALS
WEIGHT: 165.2 LBS | HEART RATE: 54 BPM | OXYGEN SATURATION: 98 % | SYSTOLIC BLOOD PRESSURE: 126 MMHG | HEIGHT: 68 IN | BODY MASS INDEX: 25.04 KG/M2 | DIASTOLIC BLOOD PRESSURE: 72 MMHG

## 2025-04-17 DIAGNOSIS — I47.19 ATRIAL TACHYCARDIA: Chronic | ICD-10-CM

## 2025-04-17 DIAGNOSIS — I10 ESSENTIAL HYPERTENSION: Chronic | ICD-10-CM

## 2025-04-17 DIAGNOSIS — I49.3 PVC'S (PREMATURE VENTRICULAR CONTRACTIONS): Chronic | ICD-10-CM

## 2025-04-17 DIAGNOSIS — I48.0 PAF (PAROXYSMAL ATRIAL FIBRILLATION): Primary | Chronic | ICD-10-CM

## 2025-04-17 DIAGNOSIS — I25.42 SPONTANEOUS DISSECTION OF CORONARY ARTERY: Chronic | ICD-10-CM

## 2025-04-17 PROCEDURE — 93000 ELECTROCARDIOGRAM COMPLETE: CPT

## 2025-04-17 PROCEDURE — 99214 OFFICE O/P EST MOD 30 MIN: CPT

## 2025-04-17 RX ORDER — METOPROLOL TARTRATE 25 MG/1
25 TABLET, FILM COATED ORAL 2 TIMES DAILY
Qty: 60 TABLET | Refills: 4 | Status: SHIPPED | OUTPATIENT
Start: 2025-04-17

## 2025-04-17 NOTE — PROGRESS NOTES
Carolyn Chaparro  [id number]  1960  [phone]      [facility] CARDIOLOGY     Referring Provider: No ref. provider found     Kelsey Yin SHAISTA, DO  [address] [facility] [address]    Chief Complaint   Patient presents with    PAF (paroxysmal atrial fibrillation)       Problem List  Paroxymal atrial fibrillation/frequent PVCs  [id number] 2 (sex, HTN)  Monitor, 12/19/2018: occasional PVC's, rare couplets, rare PAC's, brief atrial tachycardia, 13 beats.  Monitor, 09/20/2019: Occasional PVCs (1.8%), occasional couplets. Brief atach, longest 14 beats.  [facility] Atrial fibrillation with RVR  [facility], 08/08/2023: SB/SR. Occasional PACs/PVCs. Brief atach.  7-day Holter 8/14/2024: Predominantly normal sinus rhythm.  Occasional PACs.  Brief atrial tach.  Very frequent PVCs (22% burden).  Coronary artery dissection  LHC/NSTEMI, 10/02/2018: Spontaneous circumflex dissection with improvement in flow initially following an attempted wiring of the vessel. With ongoing attempts at advancing the wire however the dissection propagated proximally. With the use of Cardene, nitroglycerin, and Integrilin flow was improved. No evidence of LAD or RCA disease.  Echo, 10/02/2018, 07/18/2019, Echo, 10/12/2022:  normal EF with no hemodynamically significant VHD  Echo, 01/31/2024: EF 60%. Trace MR. Trace to mild TR with RVSP 24 mmHg.   NSTEMI/LHC, 01/31/2024: Occluded D1 in a small distal segment, possibly related to SCAD given her history. Improved left circumflex flow, healed from prior angiogram in 2018 (related to SCAD). Otherwise minimal disease a diffusely tortuous right dominant system. Normal LVEDP of 12mmHg.  Hypertension  Hyperlipidemia  Snoring  GERD      History of Present Illness   Carolyn Chaparro is a 64 y.o. female who presents to my electrophysiology clinic for follow up of  frequent PVCs with a history of paroxysmal atrial fibrillation. Since we last saw  "the patient, she went to the ER on 4/9/25 with palpitations. She had been traveling recently and stressed. She converted on her own spontaneously. She also had a short episode in March as well. She has seen ENT and allergist that states her sotalol is \"causing\" her sinus issues. She is taking eliquis as pill in pocket. Patient has an upcoming trip to [place].       Outpatient Medications Marked as Taking for the 4/17/25 encounter (Office Visit) with Wendy Godwin APRN   Medication Sig Dispense Refill    aspirin 81 MG EC tablet Take 1 tablet by mouth Daily.      calcium carbonate (OS-MOIZ) 600 MG tablet Take 1 tablet by mouth Daily.      cetirizine (zyrTEC) 10 MG tablet Take 1 tablet by mouth Daily.      cholecalciferol (VITAMIN D3) 1000 units tablet Take 1 tablet by mouth Daily.      cyclobenzaprine (FLEXERIL) 10 MG tablet Take 1 tablet by mouth 3 (Three) Times a Day As Needed for Muscle Spasms. 12 tablet 0    ibuprofen (ADVIL,MOTRIN) 200 MG tablet Take 2 tablets by mouth 2 (Two) Times a Day As Needed for Mild Pain (For headaches).      MAGNESIUM GLYCINATE PO Take 200 mg by mouth Daily.      Multiple Vitamins-Minerals (MULTIVITAMIN ADULT PO) Take 1 tablet by mouth Daily.      nitroglycerin (NITROSTAT) 0.4 MG SL tablet 1 under the tongue as needed for angina, may repeat q5mins for up three doses 25 tablet 11    NON FORMULARY C pap nightly      pantoprazole (PROTONIX) 40 MG EC tablet Take 1 tablet by mouth Daily.      polyethylene glycol (MiraLax) 17 GM/SCOOP powder Take 17 g by mouth Daily.      Probiotic Product (PROBIOTIC ADVANCED PO) Take 1 tablet by mouth Daily.      rosuvastatin (CRESTOR) 40 MG tablet Take 1 tablet by mouth Daily. 30 tablet 11    terazosin (HYTRIN) 2 MG capsule TAKE ONE CAPSULE BY MOUTH ONCE NIGHTLY 90 capsule 3    vitamin B-12 (CYANOCOBALAMIN) 1000 MCG tablet Take 1 tablet by mouth Daily.      [DISCONTINUED] sotalol (Betapace) 80 MG tablet Take 0.5 tablets by mouth Daily. 45 tablet 0        " "    Physical Exam  Vitals:    04/17/25 1115   BP: 126/72   BP Location: Left arm   Patient Position: Sitting   Cuff Size: Adult   Pulse: 54   SpO2: 98%   Weight: 74.9 kg (165 lb 3.2 oz)   Height: 172.7 cm (68\")     Body mass index is 25.12 kg/m².  Constitutional:       Appearance: Healthy appearance.   Neck:      Vascular: JVD normal.   Pulmonary:      Effort: Pulmonary effort is normal.      Breath sounds: Normal breath sounds.   Cardiovascular:      Normal rate. Regular rhythm. Normal S1. Normal S2.       Murmurs: There is no murmur.      No gallop.  No rub.   Pulses:     Intact distal pulses.   Edema:     Peripheral edema absent.   Neurological:      General: No focal deficit present.          Diagnostic Data    ECG 12 Lead    Date/Time: 4/17/2025 1:00 PM  Performed by: Wendy Godwin APRN    Authorized by: Wendy Godwin APRN  Comparison: compared with previous ECG from 4/9/2025  Rhythm: sinus bradycardia  Rate: bradycardic  BPM: 49  QRS axis: normal  Other findings: non-specific ST-T wave changes    Clinical impression: abnormal EKG          Lab Results   Component Value Date    GLUCOSE 122 (H) 04/09/2025    CALCIUM 9.5 04/09/2025     04/09/2025    K 4.1 04/09/2025    CO2 24.0 04/09/2025     (H) 04/09/2025    BUN 21 04/09/2025    CREATININE 0.68 04/09/2025    EGFRIFNONA 95 02/09/2021    BCR 30.9 (H) 04/09/2025    ANIONGAP 11.0 04/09/2025     Lab Results   Component Value Date    WBC 6.29 04/09/2025    HGB 13.9 04/09/2025    HCT 41.3 04/09/2025    MCV 90.8 04/09/2025     04/09/2025     Lab Results   Component Value Date    INR 0.98 01/30/2024    INR 0.97 05/14/2023    PROTIME 13.1 01/30/2024    PROTIME 13.0 05/14/2023     Lab Results   Component Value Date    TSH 5.460 (H) 04/09/2025       I personally viewed and interpreted the patient's EKG/Telemetry/lab data    Carolyn Chaparro  reports that she has never smoked. She has been exposed to tobacco smoke. She has never used smokeless " tobacco. I have educated her on the risk of diseases from using tobacco products such as cancer, COPD, and heart disease.           Assessment and Plan  Diagnoses and all orders for this visit:    1. PAF (paroxysmal atrial fibrillation) (Primary)    2. Atrial tachycardia    3. PVC's (premature ventricular contractions)    4. Spontaneous dissection of coronary artery    5. Essential hypertension    Other orders  -     metoprolol tartrate (LOPRESSOR) 25 MG tablet; Take 1 tablet by mouth 2 (Two) Times a Day.  Dispense: 60 tablet; Refill: 4  -     ECG 12 Lead        Paroxymal atrial fibrillation / Atrial tachycardia / PVCs  -[id number] 2 (sex, HTN)  -Echo, 1/31/24: EF 60%, trace MR, trace to mild TR  -[facility] ED, 05/14/2023: Atrial fibrillation with RVR  -Monitor, 8/14/2024: Predominantly normal sinus rhythm.  Occasional PACs.  Brief atrial tach.  Very frequent PVCs (22% burden).  -2 break through episodes on sotalol 40mg po qday. ENT and allergist recommend stopping BB.   -Will stop sotalol and start metoprolol 25 mg BID   -Discussed options of dofetilide with inpatient loading vs possible ablation. Patient would like to move forward with PFA with ensite.    -Patient will start taking eliquis BID 3-4 weeks prior to procedure.   -Use prior CTA   -Hold eliquis morning of.   -Hold metoprolol 5 days prior       SCAD   - overall doing well     Hypertension  -Well controlled, continue current medication regimen    Follow-Up  Return for Follow up after procedure.      Thank you for allowing me to participate in the care of your patient. Please to not hesitate to contact me with additional questions or concerns.

## 2025-04-21 DIAGNOSIS — I48.0 PAF (PAROXYSMAL ATRIAL FIBRILLATION): Primary | ICD-10-CM

## 2025-04-29 ENCOUNTER — PREP FOR SURGERY (OUTPATIENT)
Dept: OTHER | Facility: HOSPITAL | Age: 65
End: 2025-04-29
Payer: COMMERCIAL

## 2025-04-29 DIAGNOSIS — I48.0 PAF (PAROXYSMAL ATRIAL FIBRILLATION): Primary | ICD-10-CM

## 2025-04-29 RX ORDER — ONDANSETRON 2 MG/ML
4 INJECTION INTRAMUSCULAR; INTRAVENOUS EVERY 6 HOURS PRN
OUTPATIENT
Start: 2025-04-29

## 2025-04-29 RX ORDER — NITROGLYCERIN 0.4 MG/1
0.4 TABLET SUBLINGUAL
OUTPATIENT
Start: 2025-04-29

## 2025-04-29 RX ORDER — SODIUM CHLORIDE 0.9 % (FLUSH) 0.9 %
10 SYRINGE (ML) INJECTION AS NEEDED
OUTPATIENT
Start: 2025-04-29

## 2025-04-29 RX ORDER — SODIUM CHLORIDE 0.9 % (FLUSH) 0.9 %
10 SYRINGE (ML) INJECTION EVERY 12 HOURS SCHEDULED
OUTPATIENT
Start: 2025-04-29

## 2025-04-29 RX ORDER — ACETAMINOPHEN 325 MG/1
650 TABLET ORAL EVERY 4 HOURS PRN
OUTPATIENT
Start: 2025-04-29

## 2025-04-29 RX ORDER — SODIUM CHLORIDE 9 MG/ML
40 INJECTION, SOLUTION INTRAVENOUS AS NEEDED
OUTPATIENT
Start: 2025-04-29

## 2025-05-20 NOTE — PROGRESS NOTES
Sleep Clinic Follow Up Note    Chief Complaint  Sleep Apnea and Nasal Abnormalities    Subjective     History of Present Illness (from previous encounter on 5/22/2024):  Carolyn Chaparro is a 63 y.o. female who returns for follow-up and compliance of PAP therapy.  The Pap report has been reviewed.  Overall usage is at 83% with compliance at 78%.  The patient averages 6 hours and 47 minutes of therapy.  Sleep apnea is well-controlled with an AHI of 0.5/H. I will refill the patient's supplies, and I have asked them to return for follow-up and compliance in 1 year or sooner should they have further questions or concerns. (End copied text).    Interval History:  Carolyn Chaparro is a 64 y.o. female returns for follow up and compliance of PAP therapy. The patient was last seen on 5/22/2024 by me. Overall the patient feels good with regard to therapy. The device appears to be working appropriately. On average the patient sleeps 7-9 hours per night. The patient wakes 0 times per night.     The patient reports the following changes to their medical and medication history since they were last seen:  Afib- having an ablation in June    Further details are as follows:      Port[place] Scale is (out of 24): Total score: 7     Weight:  Current Weight: 167 lb    Weight change in the last year:  gain: 0 lbs    The patient's relevant past medical, surgical, family, and social history reviewed and updated in Epic as appropriate.    PMH:    Past Medical History:   Diagnosis Date    Allergic     Erythromycin    Aneurysm 10/01/18    Ascending Aorta 3.9 at 1/30/24    Anxiety     Arrhythmia afib after covid    last time was 5/24/23    Arthritis 2011    No problems now.    Atrial fibrillation 05/14/2023    stopped after 90 min. went to ER    Chronic constipation     Depression ??    Dyslipidemia 09/11/2024    GERD (gastroesophageal reflux disease)     Headache     Heart palpitations     Hypertension     Injury of back 2011  "   No problems now    Myocardial infarction 10/01/18    Spontaneous dissection    CJ (obstructive sleep apnea) 2023    Osteopenia     Post-menopause on HRT (hormone replacement therapy)     not currently    PVCs (premature ventricular contractions)     hx    Spontaneous dissection of coronary artery     \"attempted stent, but heart re-routed itself\"     Urinary tract infection      Past Surgical History:   Procedure Laterality Date    CARDIAC CATHETERIZATION N/A 10/02/2018    Procedure: Left Heart Cath;  Surgeon: Hellen Allan MD;  Location:  [facility];  Service: Cardiology    CARDIAC CATHETERIZATION N/A 2024    Procedure: Left Heart Cath;  Surgeon: Yo Grewal III, MD;  Location:  [facility];  Service: Cardiology;  Laterality: N/A;    COLONOSCOPY      LAPAROSCOPIC ASSISTED VAGINAL HYSTERECTOMY SALPINGO OOPHORECTOMY Bilateral     LAPAROSCOPY WITH LASER      NASAL POLYP EXCISION      benign-caused nose bleeds    PERINEOPLASTY      POSTERIOR REPAIR      SACROCOLPOPEXY N/A 2021    Procedure: SACROCOLPOPEXY LAPAROSCOPIC WITH ROBOTIC WITH MID URETHRAL SLING  CYSTOSCOPY POSTERIOR REPAIR;  Surgeon: Thelma Mortensen MD;  Location: [facility] OR;  Service: Robotics - DaVinci;  Laterality: N/A;    WISDOM TOOTH EXTRACTION       OB History          3    Para   2    Term                AB        Living   2         SAB        IAB        Ectopic        Molar        Multiple        Live Births                  Allergies   Allergen Reactions    Erythromycin Diarrhea    Bisoprolol Other (See Comments)     Ineffective in controlling palps       MEDS:  Prior to Admission medications    Medication Sig Start Date End Date Taking? Authorizing Provider   apixaban (ELIQUIS) 5 MG tablet tablet Take 1 tablet by mouth 2 (Two) Times a Day. 25   Wendy Godwin APRN   aspirin 81 MG EC tablet Take 1 tablet by mouth Daily.    Provider, MD Vernon   calcium " carbonate (OS-MOIZ) 600 MG tablet Take 1 tablet by mouth Daily.    Vernon Padilla MD   cetirizine (zyrTEC) 10 MG tablet Take 1 tablet by mouth Daily.    Emergency, Nurse Epic, RN   cholecalciferol (VITAMIN D3) 1000 units tablet Take 1 tablet by mouth Daily.    Vernon Padilla MD   cyclobenzaprine (FLEXERIL) 10 MG tablet Take 1 tablet by mouth 3 (Three) Times a Day As Needed for Muscle Spasms. 1/29/25   Khris Sepulveda PA   ibuprofen (ADVIL,MOTRIN) 200 MG tablet Take 2 tablets by mouth 2 (Two) Times a Day As Needed for Mild Pain (For headaches).    Vernon Padilla MD   MAGNESIUM GLYCINATE PO Take 200 mg by mouth Daily.    Vernon Padilla MD   metoprolol tartrate (LOPRESSOR) 25 MG tablet Take 1 tablet by mouth 2 (Two) Times a Day. 4/17/25   Wendy Godwin APRN   Multiple Vitamins-Minerals (MULTIVITAMIN ADULT PO) Take 1 tablet by mouth Daily.    Vernon Padilla MD   nitroglycerin (NITROSTAT) 0.4 MG SL tablet 1 under the tongue as needed for angina, may repeat q5mins for up three doses 7/10/24   Kary Jameson PA-C   NON FORMULARY C pap nightly    Vernon Padilla MD   pantoprazole (PROTONIX) 40 MG EC tablet Take 1 tablet by mouth Daily.    Vernon Padilla MD   polyethylene glycol (MiraLax) 17 GM/SCOOP powder Take 17 g by mouth Daily.    Vernon Padilla MD   Probiotic Product (PROBIOTIC ADVANCED PO) Take 1 tablet by mouth Daily.    Vernon Padilla MD   rosuvastatin (CRESTOR) 40 MG tablet Take 1 tablet by mouth Daily. 1/15/25   Hellen Allan MD   terazosin (HYTRIN) 2 MG capsule TAKE ONE CAPSULE BY MOUTH ONCE NIGHTLY 6/10/24   Hellen Allan MD   vitamin B-12 (CYANOCOBALAMIN) 1000 MCG tablet Take 1 tablet by mouth Daily.    Vernon Padilla MD         FH:  Family History   Problem Relation Age of Onset    Stroke Mother         multiple strokes    Heart disease Mother         quad bypass    Hypertension Mother     Heart failure Father       "   CHF    Thyroid disease Sister     Thyroid disease Sister        Objective   Vital Signs:  /82   Pulse (!) 46   Temp 97.6 °F (36.4 °C) (Infrared)   Ht 172.7 cm (68\")   Wt 75.8 kg (167 lb)   SpO2 97%   BMI 25.39 kg/m²     Patient's (Body mass index is 25.39 kg/m².) indicates that they are overweight (BMI 25-29.9)           Physical Exam  Vitals reviewed.   Constitutional:       Appearance: Normal appearance.   HENT:      Head: Normocephalic and atraumatic.      Nose: Nose normal.      Mouth/Throat:      Mouth: Mucous membranes are moist.   Cardiovascular:      Rate and Rhythm: Normal rate and regular rhythm.      Heart sounds: No murmur heard.     No friction rub. No gallop.   Pulmonary:      Effort: Pulmonary effort is normal. No respiratory distress.      Breath sounds: Normal breath sounds. No wheezing or rhonchi.   Neurological:      Mental Status: She is alert and oriented to person, place, and time.   Psychiatric:         Behavior: Behavior normal.           PAP Report:  AHI: 0.8/h  Days of Usage: 87/90 (97%)  Number of Days Greater than 4 hours: 96%  Average time (days used): 7 hours 25 minutes  95th Percentile Pressure: 9.8 cmH2O  95th percentile leaks: 30.9 L/min  Settings: Auto CPAP-8/10 cm H2O, EPR full-time, EPR level 2, response standard.        Assessment and Plan  Carolyn Chaparro is a 64 y.o. female who returns for follow-up and compliance of PAP therapy.  The Pap report has been reviewed.  Overall usage is at 97% with compliance at 96%.  The patient averaged 7 hours and 25 minutes of therapy.  Sleep apnea is well-controlled with an AHI of 0.8/H. I will refill the patient's supplies, and I have asked them to return for follow-up and compliance in 1 year or sooner should they have further questions or concerns. She has been sneezing quite a bit. She has seen ENT x 2 and was told that she had a deviated septum and had not had this before. She wonders if this has something to do " with her device. She cleans her device frequently.     Diagnoses and all orders for this visit:    1. CJ (obstructive sleep apnea) (Primary)  -     PAP Therapy    2. Overweight (BMI 25.0-29.9)           The patient continues to use and benefit from PAP therapy.    1. The patient was counseled regarding multimodal approach with healthy nutrition, healthy sleep, regular physical activity, social activities, counseling, and medications. Encouraged to practice lateral sleep position. Avoid alcohol and sedatives close to bedtime.     2.  We will refill supplies x1 year.  Return to clinic 1 year or sooner if symptoms warrant. I have reviewed the results of my evaluation and impression and discussed my recommendations in detail with the patient.           Follow Up  Return in about 1 year (around 5/23/2026) for Annual visit.  Patient was given instructions and counseling regarding her condition or for health maintenance advice. Please see specific information pulled into the AVS if appropriate.       REE Rodrigez, ACNP-BC  Pulmonology, Critical Care, and Sleep Medicine

## 2025-05-23 ENCOUNTER — OFFICE VISIT (OUTPATIENT)
Dept: SLEEP MEDICINE | Age: 65
End: 2025-05-23
Payer: COMMERCIAL

## 2025-05-23 VITALS
HEIGHT: 68 IN | DIASTOLIC BLOOD PRESSURE: 82 MMHG | TEMPERATURE: 97.6 F | HEART RATE: 46 BPM | SYSTOLIC BLOOD PRESSURE: 130 MMHG | WEIGHT: 167 LBS | OXYGEN SATURATION: 97 % | BODY MASS INDEX: 25.31 KG/M2

## 2025-05-23 DIAGNOSIS — G47.33 OSA (OBSTRUCTIVE SLEEP APNEA): Primary | ICD-10-CM

## 2025-05-23 DIAGNOSIS — E66.3 OVERWEIGHT (BMI 25.0-29.9): ICD-10-CM

## 2025-06-03 ENCOUNTER — TELEPHONE (OUTPATIENT)
Age: 65
End: 2025-06-03
Payer: COMMERCIAL

## 2025-06-09 ENCOUNTER — TELEPHONE (OUTPATIENT)
Dept: SLEEP MEDICINE | Age: 65
End: 2025-06-09
Payer: COMMERCIAL

## 2025-06-09 NOTE — NURSING NOTE
PRE-PVA ASSESSMENT  Carolyn Chaparro 1960   [address] [facility] [address]   [phone]        Referral Source: Kelsey Yin DO   Information obtained from: [x] Medical record review  [x] Patient report  Scheduled for: PVA on 6/30/2025 with Dr. Morrison    Allergies   Allergen Reactions    Erythromycin Diarrhea    Bisoprolol Other (See Comments)     Ineffective in controlling palps       AFib Specific History:  AFIBTYPE: paroxysmal    CHADS-VASc Risk Assessment               2 Total Score    1 Hypertension    1 Sex: Female    Criteria that do not apply:    CHF    Age >/= 75    DM    PRIOR STROKE/TIA/THROMBO    Vascular Disease    Age 65-74            Anticoagulation: Eliquis 5 mg BID, Start Date: 6/2/2025. No missed doses.   Cardioversion x 0  Failed AAD(s): sotalol   Prior Ablation: 0    Is Ms. Chaparro aware of her AFib? Yes   Onset: 2023    Exacerbations: stress   Frequency: every few months Alleviations: none     Duration: hours      Symptoms:   [x] Palpitations:    [] Chest Discomfort:    [] Dizziness:    [] Presyncope:    [] Lightheadedness:   [] Syncope:    [] Fatigue:    [x] Other: heart racing    [] Short of Breath:     Last Echo(s):  [x] TTE Date:   Results for orders placed during the hospital encounter of 01/30/24    Adult Transthoracic Echo Complete W/ Cont if Necessary Per Protocol    Interpretation Summary    Left ventricular systolic function is normal. Estimated left ventricular EF = 60%    Left ventricular diastolic function was normal.    Trace mitral valve regurgitation is present.    Trace to mild tricuspid valve regurgitation is present.    Calculated right ventricular systolic pressure from tricuspid regurgitation is 24 mmHg.     Past medical History:     [] Diabetes NO                     Hemoglobin A1C   Date Value Ref Range Status   01/29/2025 5.20 4.80 - 5.60 % Final   01/31/2024 4.90 4.80 - 5.60 % Final          [] HYPOthyroidism NO  [] HYPERthyroidism NO          TSH   Date Value Ref Range Status   04/09/2025 5.460 (H) 0.270 - 4.200 uIU/mL Final   01/30/2024 2.450 0.270 - 4.200 uIU/mL Final       [x] HTN        [x] Tx: terazosin    [] Heart Failure  NO  [] CVA    NO                           [] TIA NO     [x] CAD         [x] MI - NSTEMI x1- 2024          [x] Dyslipidemia  [x] Statin indicated: rosuvastatin     [x] Ischemic Evaluation       [x] Heart Cath: NSTEMI/LHC, 01/31/2024: Occluded D1 in a small distal segment, possibly related to SCAD given her history. Improved left circumflex flow, healed from prior angiogram in 2018 (related to SCAD). Otherwise minimal disease a diffusely tortuous right dominant system. Normal LVEDP of 12mmHg.     [x] Sleep Apnea Diagnosed       Device: CPAP        Compliance: compliance all of the time    [] Obesity NO      Other Pertinent PMH:     STOP the following medication(s) as indicated:    Eliquis Take last dose on:  6/29/25   Metoprolol Take last dose on:  6/25/25     Summary of Patient Contact:    I spoke with Ms. Chaparro about her upcoming PVA.   She was well informed about the procedure from prior discussion with Dr. Morrison and from reading the provided literature.  We discussed the procedure at length including risks, anesthesia, intra-op procedures, recovery, bedrest, normal post-procedure expectations, and success rates. Patient was advised to call with any medication changes, new or worsening symptoms, or changes to their medical plan of care. I answered a few remaining questions. Ms. Chaparro verbalized understanding and she is ready to proceed.

## 2025-06-23 ENCOUNTER — PRE-ADMISSION TESTING (OUTPATIENT)
Dept: PREADMISSION TESTING | Facility: HOSPITAL | Age: 65
End: 2025-06-23
Payer: COMMERCIAL

## 2025-06-23 DIAGNOSIS — I48.0 PAF (PAROXYSMAL ATRIAL FIBRILLATION): ICD-10-CM

## 2025-06-23 LAB
ANION GAP SERPL CALCULATED.3IONS-SCNC: 9 MMOL/L (ref 5–15)
BUN SERPL-MCNC: 16 MG/DL (ref 8–23)
BUN/CREAT SERPL: 25.4 (ref 7–25)
CALCIUM SPEC-SCNC: 8.8 MG/DL (ref 8.6–10.5)
CHLORIDE SERPL-SCNC: 101 MMOL/L (ref 98–107)
CO2 SERPL-SCNC: 29 MMOL/L (ref 22–29)
CREAT SERPL-MCNC: 0.63 MG/DL (ref 0.57–1)
DEPRECATED RDW RBC AUTO: 41.7 FL (ref 37–54)
EGFRCR SERPLBLD CKD-EPI 2021: 99.8 ML/MIN/1.73
ERYTHROCYTE [DISTWIDTH] IN BLOOD BY AUTOMATED COUNT: 12.2 % (ref 12.3–15.4)
GLUCOSE SERPL-MCNC: 91 MG/DL (ref 65–99)
HCT VFR BLD AUTO: 41.5 % (ref 34–46.6)
HGB BLD-MCNC: 13.6 G/DL (ref 12–15.9)
MCH RBC QN AUTO: 30.4 PG (ref 26.6–33)
MCHC RBC AUTO-ENTMCNC: 32.8 G/DL (ref 31.5–35.7)
MCV RBC AUTO: 92.6 FL (ref 79–97)
PLATELET # BLD AUTO: 190 10*3/MM3 (ref 140–450)
PMV BLD AUTO: 9.7 FL (ref 6–12)
POTASSIUM SERPL-SCNC: 3.9 MMOL/L (ref 3.5–5.2)
RBC # BLD AUTO: 4.48 10*6/MM3 (ref 3.77–5.28)
SODIUM SERPL-SCNC: 139 MMOL/L (ref 136–145)
WBC NRBC COR # BLD AUTO: 5.24 10*3/MM3 (ref 3.4–10.8)

## 2025-06-23 PROCEDURE — 36415 COLL VENOUS BLD VENIPUNCTURE: CPT

## 2025-06-23 PROCEDURE — 85027 COMPLETE CBC AUTOMATED: CPT

## 2025-06-23 PROCEDURE — 80048 BASIC METABOLIC PNL TOTAL CA: CPT

## 2025-06-23 RX ORDER — SENNOSIDES 8.6 MG/1
1 TABLET ORAL EVERY OTHER DAY
COMMUNITY

## 2025-06-23 RX ORDER — SOTALOL HYDROCHLORIDE 80 MG/1
40 TABLET ORAL DAILY
COMMUNITY
End: 2025-06-30 | Stop reason: HOSPADM

## 2025-06-23 NOTE — DISCHARGE INSTRUCTIONS
"Dear Patient,    Do NOT eat, drink, or smoke after midnight the night before your procedure.   Take your medications as instructed by your doctor.    Glasses and jewelry may be worn, but dentures must be removed prior to your procedure.    Leave any items you consider valuable at home.      MORNING of your Procedure, please bring the following:     -Photo ID and insurance card(s)    -ALL medications in their ORIGINAL CONTAINERS    -Co-pay and/or deductible required by your insurance   -Copy of living will or power of  document (if not brought to    Pre-Admission Testing department)   -CPAP mask and tubing, not your machine (if applicable)    -Relaxation aids (music, books, magazines)   -Skin Prep Instruction Sheet (if applicable)   -Relaxation Aids    Check in on the 2nd floor in the [address] [facility].  Your procedure will be performed in the cath lab or EP lab.  During your procedure, your family will wait in the cath lab waiting area where you checked in.      Need to make arrangements for transportation prior to discharge.    A handout regarding \"Heart Healthy Eating\" was provided today to encourage healthy eating habits.    Booklet published by [employer] was given in Pre-Admission testing.  This booklet is for informational purposes only.  If you have any questions about your procedure, please speak with your physician.      Please note:  If you are scheduled to have one of the following procedures: Pulmonary Vein Ablation, Lead Extraction, MitraClip, Cerebral Coilings or Embolization, please let your family know that after your procedure you will be going to recovery unit on the 2nd floor of the [facility].  When the physician is finished speaking with your family after your procedure is completed, your family will be directed or escorted to the surgery waiting area in the [facility].  This is where your family will wait until you are given a room assignment and then your family will be directed to the " appropriate unit.

## 2025-06-26 ENCOUNTER — TELEPHONE (OUTPATIENT)
Dept: CARDIOLOGY | Facility: CLINIC | Age: 65
End: 2025-06-26
Payer: COMMERCIAL

## 2025-06-26 NOTE — TELEPHONE ENCOUNTER
Patient contacted to review upcoming PVA scheduled with Dr. Morrison. Patient reports uninterrupted therapy with Eliquis. No changes to plan of care reported. All questions answered at this time. Patient encouraged to reach out with any questions or concerns post PVA. Patient verbalized understanding.

## 2025-06-30 ENCOUNTER — HOSPITAL ENCOUNTER (OUTPATIENT)
Facility: HOSPITAL | Age: 65
Setting detail: HOSPITAL OUTPATIENT SURGERY
Discharge: HOME | End: 2025-06-30
Attending: INTERNAL MEDICINE | Admitting: INTERNAL MEDICINE
Payer: COMMERCIAL

## 2025-06-30 ENCOUNTER — ANESTHESIA (OUTPATIENT)
Dept: CARDIOLOGY | Facility: HOSPITAL | Age: 65
End: 2025-06-30
Payer: COMMERCIAL

## 2025-06-30 ENCOUNTER — ANESTHESIA EVENT (OUTPATIENT)
Dept: CARDIOLOGY | Facility: HOSPITAL | Age: 65
End: 2025-06-30
Payer: COMMERCIAL

## 2025-06-30 VITALS
HEIGHT: 68 IN | WEIGHT: 168.4 LBS | OXYGEN SATURATION: 100 % | TEMPERATURE: 97.8 F | DIASTOLIC BLOOD PRESSURE: 79 MMHG | HEART RATE: 81 BPM | RESPIRATION RATE: 12 BRPM | SYSTOLIC BLOOD PRESSURE: 134 MMHG | BODY MASS INDEX: 25.52 KG/M2

## 2025-06-30 DIAGNOSIS — I48.0 PAF (PAROXYSMAL ATRIAL FIBRILLATION): ICD-10-CM

## 2025-06-30 LAB — GLUCOSE BLDC GLUCOMTR-MCNC: 90 MG/DL (ref 70–130)

## 2025-06-30 PROCEDURE — 93622 COMP EP EVAL L VENTR PAC&REC: CPT | Performed by: INTERNAL MEDICINE

## 2025-06-30 PROCEDURE — C1730 CATH, EP, 19 OR FEW ELECT: HCPCS | Performed by: INTERNAL MEDICINE

## 2025-06-30 PROCEDURE — C1732 CATH, EP, DIAG/ABL, 3D/VECT: HCPCS | Performed by: INTERNAL MEDICINE

## 2025-06-30 PROCEDURE — C1759 CATH, INTRA ECHOCARDIOGRAPHY: HCPCS | Performed by: INTERNAL MEDICINE

## 2025-06-30 PROCEDURE — 93656 COMPRE EP EVAL ABLTJ ATR FIB: CPT | Performed by: INTERNAL MEDICINE

## 2025-06-30 PROCEDURE — C1733 CATH, EP, OTHR THAN COOL-TIP: HCPCS | Performed by: INTERNAL MEDICINE

## 2025-06-30 PROCEDURE — 25010000002 ONDANSETRON PER 1 MG

## 2025-06-30 PROCEDURE — 25010000002 HEPARIN (PORCINE) PER 1000 UNITS: Performed by: INTERNAL MEDICINE

## 2025-06-30 PROCEDURE — 25010000002 PROPOFOL 10 MG/ML EMULSION: Performed by: NURSE ANESTHETIST, CERTIFIED REGISTERED

## 2025-06-30 PROCEDURE — C1760 CLOSURE DEV, VASC: HCPCS | Performed by: INTERNAL MEDICINE

## 2025-06-30 PROCEDURE — C1893 INTRO/SHEATH, FIXED,NON-PEEL: HCPCS | Performed by: INTERNAL MEDICINE

## 2025-06-30 PROCEDURE — 25010000002 LIDOCAINE 1 % SOLUTION: Performed by: INTERNAL MEDICINE

## 2025-06-30 PROCEDURE — C1894 INTRO/SHEATH, NON-LASER: HCPCS | Performed by: INTERNAL MEDICINE

## 2025-06-30 PROCEDURE — 85347 COAGULATION TIME ACTIVATED: CPT

## 2025-06-30 PROCEDURE — 25010000002 BUPIVACAINE 0.5 % SOLUTION: Performed by: INTERNAL MEDICINE

## 2025-06-30 PROCEDURE — 25010000002 SUGAMMADEX 200 MG/2ML SOLUTION: Performed by: NURSE ANESTHETIST, CERTIFIED REGISTERED

## 2025-06-30 PROCEDURE — 25810000003 SODIUM CHLORIDE 0.9 % SOLUTION 250 ML FLEX CONT: Performed by: NURSE ANESTHETIST, CERTIFIED REGISTERED

## 2025-06-30 PROCEDURE — 93005 ELECTROCARDIOGRAM TRACING: CPT | Performed by: INTERNAL MEDICINE

## 2025-06-30 PROCEDURE — C1766 INTRO/SHEATH,STRBLE,NON-PEEL: HCPCS | Performed by: INTERNAL MEDICINE

## 2025-06-30 PROCEDURE — 25810000003 SODIUM CHLORIDE 0.9 % SOLUTION: Performed by: INTERNAL MEDICINE

## 2025-06-30 PROCEDURE — 93010 ELECTROCARDIOGRAM REPORT: CPT | Performed by: INTERNAL MEDICINE

## 2025-06-30 PROCEDURE — 82948 REAGENT STRIP/BLOOD GLUCOSE: CPT

## 2025-06-30 PROCEDURE — 93655 ICAR CATH ABLTJ DSCRT ARRHYT: CPT | Performed by: INTERNAL MEDICINE

## 2025-06-30 PROCEDURE — 25010000002 PHENYLEPHRINE 10 MG/ML SOLUTION 1 ML VIAL: Performed by: NURSE ANESTHETIST, CERTIFIED REGISTERED

## 2025-06-30 PROCEDURE — 25010000002 LIDOCAINE 1 % SOLUTION: Performed by: NURSE ANESTHETIST, CERTIFIED REGISTERED

## 2025-06-30 PROCEDURE — 25010000002 PROTAMINE SULFATE PER 10 MG: Performed by: INTERNAL MEDICINE

## 2025-06-30 RX ORDER — PROPOFOL 10 MG/ML
VIAL (ML) INTRAVENOUS AS NEEDED
Status: DISCONTINUED | OUTPATIENT
Start: 2025-06-30 | End: 2025-06-30 | Stop reason: SURG

## 2025-06-30 RX ORDER — ACETAMINOPHEN 325 MG/1
650 TABLET ORAL EVERY 6 HOURS PRN
Status: DISCONTINUED | OUTPATIENT
Start: 2025-06-30 | End: 2025-06-30 | Stop reason: HOSPADM

## 2025-06-30 RX ORDER — SODIUM CHLORIDE 0.9 % (FLUSH) 0.9 %
10 SYRINGE (ML) INJECTION EVERY 12 HOURS SCHEDULED
Status: DISCONTINUED | OUTPATIENT
Start: 2025-06-30 | End: 2025-06-30 | Stop reason: HOSPADM

## 2025-06-30 RX ORDER — SODIUM CHLORIDE 9 MG/ML
40 INJECTION, SOLUTION INTRAVENOUS AS NEEDED
Status: DISCONTINUED | OUTPATIENT
Start: 2025-06-30 | End: 2025-06-30 | Stop reason: HOSPADM

## 2025-06-30 RX ORDER — LIDOCAINE HYDROCHLORIDE 10 MG/ML
INJECTION, SOLUTION INFILTRATION; PERINEURAL AS NEEDED
Status: DISCONTINUED | OUTPATIENT
Start: 2025-06-30 | End: 2025-06-30 | Stop reason: SURG

## 2025-06-30 RX ORDER — NITROGLYCERIN 0.4 MG/1
0.4 TABLET SUBLINGUAL
Status: DISCONTINUED | OUTPATIENT
Start: 2025-06-30 | End: 2025-06-30 | Stop reason: HOSPADM

## 2025-06-30 RX ORDER — ONDANSETRON 2 MG/ML
4 INJECTION INTRAMUSCULAR; INTRAVENOUS EVERY 6 HOURS PRN
Status: DISCONTINUED | OUTPATIENT
Start: 2025-06-30 | End: 2025-06-30 | Stop reason: HOSPADM

## 2025-06-30 RX ORDER — ACETAMINOPHEN 325 MG/1
650 TABLET ORAL EVERY 4 HOURS PRN
Status: DISCONTINUED | OUTPATIENT
Start: 2025-06-30 | End: 2025-06-30 | Stop reason: HOSPADM

## 2025-06-30 RX ORDER — LIDOCAINE HYDROCHLORIDE 10 MG/ML
INJECTION, SOLUTION INFILTRATION; PERINEURAL
Status: DISCONTINUED | OUTPATIENT
Start: 2025-06-30 | End: 2025-06-30 | Stop reason: HOSPADM

## 2025-06-30 RX ORDER — PROTAMINE SULFATE 10 MG/ML
INJECTION, SOLUTION INTRAVENOUS
Status: DISCONTINUED | OUTPATIENT
Start: 2025-06-30 | End: 2025-06-30 | Stop reason: HOSPADM

## 2025-06-30 RX ORDER — ROCURONIUM BROMIDE 10 MG/ML
INJECTION, SOLUTION INTRAVENOUS AS NEEDED
Status: DISCONTINUED | OUTPATIENT
Start: 2025-06-30 | End: 2025-06-30 | Stop reason: SURG

## 2025-06-30 RX ORDER — SODIUM CHLORIDE 9 MG/ML
INJECTION, SOLUTION INTRAVENOUS
Status: DISCONTINUED | OUTPATIENT
Start: 2025-06-30 | End: 2025-06-30 | Stop reason: HOSPADM

## 2025-06-30 RX ORDER — HEPARIN SODIUM 1000 [USP'U]/ML
INJECTION, SOLUTION INTRAVENOUS; SUBCUTANEOUS
Status: DISCONTINUED | OUTPATIENT
Start: 2025-06-30 | End: 2025-06-30 | Stop reason: HOSPADM

## 2025-06-30 RX ORDER — BUPIVACAINE HYDROCHLORIDE 5 MG/ML
INJECTION, SOLUTION PERINEURAL
Status: DISCONTINUED | OUTPATIENT
Start: 2025-06-30 | End: 2025-06-30 | Stop reason: HOSPADM

## 2025-06-30 RX ORDER — SODIUM CHLORIDE 0.9 % (FLUSH) 0.9 %
10 SYRINGE (ML) INJECTION AS NEEDED
Status: DISCONTINUED | OUTPATIENT
Start: 2025-06-30 | End: 2025-06-30 | Stop reason: HOSPADM

## 2025-06-30 RX ADMIN — PROPOFOL 200 MG: 10 INJECTION, EMULSION INTRAVENOUS at 11:08

## 2025-06-30 RX ADMIN — ACETAMINOPHEN 650 MG: 325 TABLET ORAL at 13:21

## 2025-06-30 RX ADMIN — ONDANSETRON 4 MG: 2 INJECTION INTRAMUSCULAR; INTRAVENOUS at 12:11

## 2025-06-30 RX ADMIN — PHENYLEPHRINE HYDROCHLORIDE 0.2 MCG/KG/MIN: 10 INJECTION INTRAVENOUS at 11:27

## 2025-06-30 RX ADMIN — ROCURONIUM BROMIDE 50 MG: 10 INJECTION INTRAVENOUS at 11:08

## 2025-06-30 RX ADMIN — LIDOCAINE HYDROCHLORIDE 50 MG: 10 INJECTION, SOLUTION INFILTRATION; PERINEURAL at 11:08

## 2025-06-30 RX ADMIN — SUGAMMADEX 200 MG: 100 INJECTION, SOLUTION INTRAVENOUS at 12:10

## 2025-06-30 RX ADMIN — ATROPINE SULFATE 1 MG: 0.4 INJECTION, SOLUTION INTRAVENOUS at 11:38

## 2025-06-30 NOTE — ANESTHESIA PREPROCEDURE EVALUATION
Anesthesia Evaluation     Patient summary reviewed and Nursing notes reviewed   no history of anesthetic complications:   NPO Solid Status: > 8 hours  NPO Liquid Status: > 2 hours           Airway   Mallampati: I  TM distance: >3 FB  Neck ROM: full  No difficulty expected  Dental - normal exam     Pulmonary - normal exam   (+) ,sleep apnea on CPAP  (-) not a smoker  Cardiovascular   Exercise tolerance: good (4-7 METS)    ECG reviewed  PT is on anticoagulation therapy  Rhythm: regular  Rate: normal    (+) hypertension, CAD (hx coronary dissection), dysrhythmias Atrial Fib    ROS comment: -LHC/NSTEMI, 10/02/2018: Spontaneous cx dissection with improvement in flow initially following an attempted wiring of the vessel. With ongoing attempts at advancing the wire however the dissection propagated proximally. With the use of Cardene, nitroglycerin, and Integrilin flow was improved. No evidence of LAD or RCA disease.  -Echo, 10/02/2018, 07/18/2019, Echo, 10/12/2022:  normal EF with no hemodynamically significant VHD  -Echo, 01/31/2024: EF 60%. Trace MR. Trace to mild TR with RVSP 24 mmHg.   -NSTEMI/LHC, 01/31/2024: Occluded D1 in a small distal segment, possibly related to SCAD given her history. Improved left circumflex flow, healed from prior angiogram in 2018 (related to SCAD). Otherwise minimal disease a diffusely tortuous right dominant system. Normal LVEDP of 12mmHg.      Neuro/Psych  (+) psychiatric history Anxiety  (-) seizures, CVA  GI/Hepatic/Renal/Endo    (+) GERD    Musculoskeletal     Abdominal    Substance History - negative use     OB/GYN          Other        ROS/Med Hx Other: 5/2021-[id number]  Hgb 13.6 k 3.9  No n/v/gerd today, nor glp1 use  Eliquis, asa yesterday                  Anesthesia Plan    ASA 3     general     (Risks and benefits of general anesthesia discussed with patient (including MI, CVA, death, recall, aspiration, oropharyngeal/dental damage), questions answered, agreeable to  proceed.  )  intravenous induction     Anesthetic plan, risks, benefits, and alternatives have been provided, discussed and informed consent has been obtained with: patient.    Use of blood products discussed with patient  Consented to blood products.    Plan discussed with CRNA.        CODE STATUS:

## 2025-06-30 NOTE — H&P
[facility]  HISTORY AND PHYSICAL    Carolyn Chaparro  : 1960  MRN:[id number]    Date of Admission:2025    PCP: Kelsey Yin DO    Chief Complaint: Atrial fibrillation    PROBLEM LIST:   Paroxymal atrial fibrillation/frequent PVCs  [id number] 2 (sex, HTN)  Monitor, 2018: occasional PVC's, rare couplets, rare PAC's, brief atrial tachycardia, 13 beats.  Monitor, 2019: Occasional PVCs (1.8%), occasional couplets. Brief atach, longest 14 beats.  [facility] ED, 2023: Atrial fibrillation with RVR  MCOT, 2023: SB/SR. Occasional PACs/PVCs. Brief atach.  7-day Holter 2024: Predominantly normal sinus rhythm.  Occasional PACs.  Brief atrial tach.  Very frequent PVCs (22% burden).  Coronary artery dissection  LHC/NSTEMI, 10/02/2018: Spontaneous circumflex dissection with improvement in flow initially following an attempted wiring of the vessel. With ongoing attempts at advancing the wire however the dissection propagated proximally. With the use of Cardene, nitroglycerin, and Integrilin flow was improved. No evidence of LAD or RCA disease.  Echo, 10/02/2018, 2019, Echo, 10/12/2022:  normal EF with no hemodynamically significant VHD  Echo, 2024: EF 60%. Trace MR. Trace to mild TR with RVSP 24 mmHg.   NSTEMI/LHC, 2024: Occluded D1 in a small distal segment, possibly related to SCAD given her history. Improved left circumflex flow, healed from prior angiogram in 2018 (related to SCAD). Otherwise minimal disease a diffusely tortuous right dominant system. Normal LVEDP of 12mmHg.  Hypertension  Hyperlipidemia  Snoring  GERD      ALLERGIES:   Allergies   Allergen Reactions    Erythromycin Diarrhea    Bisoprolol Other (See Comments)     Ineffective in controlling palps       HOME MEDICINES:   Prior to Admission Medications       Prescriptions Last Dose Informant Patient Reported? Taking?    apixaban (ELIQUIS) 5 MG tablet tablet    No No    Take 1 tablet by mouth 2 (Two) Times a Day.    aspirin 81 MG EC tablet   Yes No    Take 1 tablet by mouth Daily.    calcium carbonate (OS-MOIZ) 600 MG tablet   Yes No    Take 1 tablet by mouth Daily.    cetirizine (zyrTEC) 10 MG tablet  Medication Bottle Yes No    Take 1 tablet by mouth Daily.    cholecalciferol (VITAMIN D3) 1000 units tablet  Medication Bottle Yes No    Take 1 tablet by mouth Daily.    ibuprofen (ADVIL,MOTRIN) 200 MG tablet  Self Yes No    Take 2 tablets by mouth 2 (Two) Times a Day As Needed for Mild Pain (For headaches).    MAGNESIUM GLYCINATE PO  Self Yes No    Take 200 mg by mouth Daily.    metoprolol tartrate (LOPRESSOR) 25 MG tablet   No No    Take 1 tablet by mouth 2 (Two) Times a Day.    Multiple Vitamins-Minerals (MULTIVITAMIN ADULT PO)  Medication Bottle Yes No    Take 1 tablet by mouth Daily.    nitroglycerin (NITROSTAT) 0.4 MG SL tablet   No No    1 under the tongue as needed for angina, may repeat q5mins for up three doses    NON FORMULARY   Yes No    C pap nightly    pantoprazole (PROTONIX) 40 MG EC tablet  Medication Bottle Yes No    Take 1 tablet by mouth Daily.    polyethylene glycol (MiraLax) 17 GM/SCOOP powder  Self Yes No    Take 17 g by mouth As Needed.    Probiotic Product (PROBIOTIC ADVANCED PO)  Medication Bottle Yes No    Take 1 tablet by mouth Daily.    rosuvastatin (CRESTOR) 40 MG tablet   No No    Take 1 tablet by mouth Daily.    senna 8.6 MG tablet  Self Yes No    Take 1 tablet by mouth Every Other Day.    sotalol (BETAPACE) 40 MG tablet  Self Yes No    Take 1 half tablet by mouth Daily.    terazosin (HYTRIN) 2 MG capsule   No No    TAKE ONE CAPSULE BY MOUTH ONCE NIGHTLY    vitamin B-12 (CYANOCOBALAMIN) 1000 MCG tablet  Medication Bottle Yes No    Take 1 tablet by mouth Daily.            Subjective     HPI: Carolyn Chaparro is a 65 y.o. female with a past medical history listed above presents to [facility] today for pulmonary vein  "ablation with general anesthesia.  It was diagnosed several years ago.  She has had several episodes of atrial fibrillation with most converting on her own spontaneously.  Her allergist and ENT are concerned about her sotalol causing sinus issues. Patient denies chest pain, shortness of breath, palpitations, LH, and syncope.  Patient is anticoagulated and denies any bleeding complications or strokelike symptoms.    ROS: All systems have been reviewed and are negative with the exception of those mentioned in the HPI and problem list above.    Past Medical History:   Past Medical History:   Diagnosis Date    Allergic     Erythromycin    Aneurysm 10/01/18    Ascending Aorta 3.9 at 1/30/24 -4.1 cm now possible    Anxiety     Arrhythmia afib after covid    last time was 5/24/23    Arthritis 2011    No problems now.    Atrial fibrillation 05/14/2023    stopped after 90 min. went to ER    Chronic constipation     Depression ??    Dyslipidemia 09/11/2024    GERD (gastroesophageal reflux disease)     Headache     Heart palpitations     History of shingles     10 years ago about- right torso area    Hypertension     Injury of back 2011    No problems now    Myocardial infarction 10/01/18    Spontaneous dissection    CJ (obstructive sleep apnea) 09/18/2023    cpap compliant    Osteopenia     Post-menopause on HRT (hormone replacement therapy)     not currently    PVCs (premature ventricular contractions)     hx    Sinus drainage     Spontaneous dissection of coronary artery     \"attempted stent, but heart re-routed itself\"  x2-  2018 oct 1st  then jan 2024    Urinary tract infection     Uses contact lenses     bilat    Wears glasses     when not wearing CL      Surgical History:   Past Surgical History:   Procedure Laterality Date    CARDIAC CATHETERIZATION N/A 10/02/2018    Procedure: Left Heart Cath;  Surgeon: Hellen Allan MD;  Location: [facility] CATH [facility]AS[facility];  Service: Cardiology    CARDIAC " "CATHETERIZATION N/A 01/31/2024    Procedure: Left Heart Cath;  Surgeon: Yo Grewal III, MD;  Location:  [facility] CATH INVASIVE LOCATION;  Service: Cardiology;  Laterality: N/A;    COLONOSCOPY      ENDOSCOPY      LAPAROSCOPIC ASSISTED VAGINAL HYSTERECTOMY SALPINGO OOPHORECTOMY Bilateral     LAPAROSCOPY WITH LASER      NASAL POLYP EXCISION  2022    benign-caused nose bleeds    PERINEOPLASTY      POSTERIOR REPAIR      SACROCOLPOPEXY N/A 05/13/2021    Procedure: SACROCOLPOPEXY LAPAROSCOPIC WITH ROBOTIC WITH MID URETHRAL SLING  CYSTOSCOPY POSTERIOR REPAIR;  Surgeon: Thelma Mortensen MD;  Location:  [facility] OR;  Service: Robotics - DaVinci;  Laterality: N/A;    WISDOM TOOTH EXTRACTION       Social History:   Social History     Socioeconomic History    Marital status:    Tobacco Use    Smoking status: Never     Passive exposure: Past    Smokeless tobacco: Never   Vaping Use    Vaping status: Never Used   Substance and Sexual Activity    Alcohol use: Never    Drug use: Never    Sexual activity: Yes     Partners: Male     Birth control/protection: Post-menopausal, Hysterectomy     Family History:   Family History   Problem Relation Age of Onset    Stroke Mother         multiple strokes    Heart disease Mother         quad bypass    Hypertension Mother     Heart failure Father         CHF    Thyroid disease Sister     Thyroid disease Sister        Objective   /83 (BP Location: Left arm, Patient Position: Lying)   Pulse 61   Temp 96.4 °F (35.8 °C) (Temporal)   Resp 16   Ht 172.7 cm (68\")   Wt 76.4 kg (168 lb 6.4 oz)   LMP  (LMP Unknown)   SpO2 92%   BMI 25.61 kg/m²   No intake or output data in the 24 hours ending 06/30/25 0911    PHYSICAL EXAM:  CONSTITUTIONAL: Well nourished, cooperative, in no acute distress  HEENT: Normocephalic, atraumatic, PERRLA, no JVD  CARDIOVASCULAR:  Regular rhythm and normal rate, no murmur, gallop, rub.   RESPIRATORY: Clear to auscultation, normal respiratory effort, no " wheezing, rales or ronchi, RA  EXTREMITIES: No gross deformities, no edema. Peripheral pulses are present and equal bilaterally  SKIN: Warm, dry. No bleeding, bruising or rash  NEUROLOGICAL: No focal deficits  PSYCHIATRIC: Normal mood and affect. Behavior is normal     Labs/Diagnostic Data    Lab Results   Component Value Date    WBC 5.24 06/23/2025    HGB 13.6 06/23/2025    HCT 41.5 06/23/2025    MCV 92.6 06/23/2025     06/23/2025     Lab Results   Component Value Date    GLUCOSE 91 06/23/2025    CALCIUM 8.8 06/23/2025     06/23/2025    K 3.9 06/23/2025    CO2 29.0 06/23/2025     06/23/2025    BUN 16.0 06/23/2025    CREATININE 0.63 06/23/2025    EGFR 99.8 06/23/2025    BCR 25.4 (H) 06/23/2025    ANIONGAP 9.0 06/23/2025         EKG/Telemetry: SR    Radiology Data:   No radiology results for the last day   Results for orders placed during the hospital encounter of 01/30/24    Adult Transthoracic Echo Complete W/ Cont if Necessary Per Protocol    Interpretation Summary    Left ventricular systolic function is normal. Estimated left ventricular EF = 60%    Left ventricular diastolic function was normal.    Trace mitral valve regurgitation is present.    Trace to mild tricuspid valve regurgitation is present.    Calculated right ventricular systolic pressure from tricuspid regurgitation is 24 mmHg.       Current Medications:  sodium chloride, 10 mL, Intravenous, Q12H           Assessment:     Atrial fibrillation  [id number] 2 (sex, HTN), anticoagulated with Eliquis  AAD: sotalol.   SCAD, 1/2024  Hypertension    Plan:     Patient is here today for pulmonary vein ablation with general anesthesia.  Patient has been on anticoagulation with no missed doses last 30 days.  Procedure, risks, and alternatives have been discussed with the patient and she is agreeable to proceed.  Further recommendations to follow.    Electronically signed by REE Parikh, 06/30/25, 8:42 AM EDT.     Please note that  portions of this note were dictated utilizing Dragon dictation.

## 2025-06-30 NOTE — ANESTHESIA PROCEDURE NOTES
Airway  Reason: elective    Date/Time: 6/30/2025 11:11 AM  Airway not difficult    General Information and Staff    Patient location during procedure: OR  CRNA/CAA: Connie Asencio CRNA    Indications and Patient Condition  Indications for airway management: airway protection    Preoxygenated: yes  MILS not maintained throughout    Mask difficulty assessment: 1 - vent by mask    Final Airway Details    Final airway type: endotracheal airway      Successful airway: ETT  Cuffed: yes   Successful intubation technique: video laryngoscopy  Endotracheal tube insertion site: oral  Blade: Joanne  Blade size: 3  ETT size (mm): 7.5  Cormack-Lehane Classification: grade I - full view of glottis  Placement verified by: chest auscultation and capnometry   Measured from: lips  ETT/EBT  to lips (cm): 20  Number of attempts at approach: 1  Assessment: lips, teeth, and gum same as pre-op and atraumatic intubation    Additional Comments  Negative epigastric sounds, Breath sound equal bilaterally with symmetric chest rise and fall

## 2025-06-30 NOTE — ANESTHESIA POSTPROCEDURE EVALUATION
Patient: Carolyn Chaparro    Procedure Summary       Date: 06/30/25 Room / Location: [facility] CATH/EP LAB F / [facility] EP INVASIVE LOCATION    Anesthesia Start: 1058 Anesthesia Stop: 1240    Procedure: Ablation atrial fibrillation with PFA and Ensite. Use prior CTA. Hold Eliquis the morning of the procedure. Hold Metoprolol 5 days prior to the procedure. She will need to start taking Eliquis BID 3-4 weeks prior to the procedure. Diagnosis:       PAF (paroxysmal atrial fibrillation)      (Afib)    Providers: Khris Morrison MD Provider: Cal Walsh MD    Anesthesia Type: general ASA Status: 3            Anesthesia Type: general    Vitals  No vitals data found for the desired time range.      /77, HR 87, RR 13, T 97.8, O2 SAT 98%    Post Anesthesia Care and Evaluation    Patient location during evaluation: PACU  Patient participation: complete - patient participated  Level of consciousness: awake and alert  Pain score: 0  Pain management: adequate    Airway patency: patent  Anesthetic complications: No anesthetic complications  PONV Status: none  Cardiovascular status: hemodynamically stable and acceptable  Respiratory status: nonlabored ventilation, acceptable and nasal cannula  Hydration status: acceptable

## 2025-07-01 ENCOUNTER — CALL CENTER PROGRAMS (OUTPATIENT)
Dept: CALL CENTER | Facility: HOSPITAL | Age: 65
End: 2025-07-01
Payer: COMMERCIAL

## 2025-07-01 ENCOUNTER — TELEPHONE (OUTPATIENT)
Dept: CARDIOLOGY | Facility: CLINIC | Age: 65
End: 2025-07-01
Payer: COMMERCIAL

## 2025-07-01 LAB
QT INTERVAL: 444 MS
QTC INTERVAL: 454 MS

## 2025-07-01 NOTE — TELEPHONE ENCOUNTER
Patient had a PVA with PFA done yesterday. She states that she has been experiencing a burning sensation in the middle of her chest today.    I explained to her that what she is feeling is normal and she may feel a burning sensation or dull ache in her chest for the next 2-3 days.     She said that she took Tylenol earlier and the pain resolved.    She will keep us updated.

## 2025-07-01 NOTE — OUTREACH NOTE
PCI/Device Survey      Flowsheet Row Responses   Facility patient discharged from? [facility]   Procedure date 06/30/25   Procedure (if device, specify in description) Ablation   Performing MD Other (annotate)  [Dr. Morrison]   Attempt successful? Yes   Call start time 0926   Call end time 0937   Symptom comments Pt is tolerating po intake WNL, per her report.   Is the patient taking prescribed medications: ASA, Apixaban   Nursing intervention Reminded to continue to take prescribed medications   Medication comments Pt v/u to stop Sotalol per AVS.   Does the patient have any of the following symptoms related to the cath/surgical site? --  [Right groin remains dry with dressing, pt denies any issues at this time.]   Nursing intervention Patient education provided   Does the patient have an appointment scheduled with the cardiologist? Yes   If the patient is a current smoker, are they able to teach back resources for cessation? Not a smoker   Did the patient feel prepared to go home on the same day as the procedure? Yes   Is the patient satisfied with the same day discharge process? Yes   PCI/Device call completed Yes            Lily KRISHNAMURTHY - Registered Nurse

## 2025-07-02 NOTE — TELEPHONE ENCOUNTER
Patient called back today and states that she has hives present in different areas all over her body. She states that they are worse on her right arm. She denies chest pain or tightness, shortness of breath, swelling or difficulty swallowing.    She is going to take Benadryl as needed tonight and let me know if she does not see any improvement.

## 2025-07-02 NOTE — TELEPHONE ENCOUNTER
Patient left voice mail message.  She states she is breaking out in a rash after an ablation on Monday, per Dr. Morrison

## 2025-07-11 LAB
ACT BLD: 326 SECONDS (ref 82–152)
ACT BLD: 371 SECONDS (ref 82–152)

## 2025-07-18 ENCOUNTER — OFFICE VISIT (OUTPATIENT)
Dept: CARDIOLOGY | Facility: CLINIC | Age: 65
End: 2025-07-18
Payer: COMMERCIAL

## 2025-07-18 VITALS
SYSTOLIC BLOOD PRESSURE: 116 MMHG | HEART RATE: 64 BPM | OXYGEN SATURATION: 96 % | WEIGHT: 169.6 LBS | BODY MASS INDEX: 25.7 KG/M2 | HEIGHT: 68 IN | DIASTOLIC BLOOD PRESSURE: 74 MMHG

## 2025-07-18 DIAGNOSIS — I25.42 SPONTANEOUS DISSECTION OF CORONARY ARTERY: Primary | ICD-10-CM

## 2025-07-18 DIAGNOSIS — E78.5 DYSLIPIDEMIA: ICD-10-CM

## 2025-07-18 DIAGNOSIS — I10 ESSENTIAL HYPERTENSION: ICD-10-CM

## 2025-07-18 PROCEDURE — 99214 OFFICE O/P EST MOD 30 MIN: CPT | Performed by: PHYSICIAN ASSISTANT

## 2025-07-18 NOTE — PROGRESS NOTES
[facility]    Date: 2025    Patient ID: Carolyn Chaparro is a 65 y.o. female   : 1960   Contact: [phone]         Chief Complaint:    Chief Complaint   Patient presents with   • Progressive angina       Problem List:  Paroxymal atrial fibrillation/frequent PVCs  [id number] 2 (sex, HTN)  Monitor, 2018: occasional PVC's, rare couplets, rare PAC's, brief atrial tachycardia, 13 beats.  Monitor, 2019: Occasional PVCs (1.8%), occasional couplets. Brief atach, longest 14 beats.  [facility] ED, 2023: Atrial fibrillation with RVR  MCOT, 2023: SB/SR. Occasional PACs/PVCs. Brief atach.  7-day Holter 2024: Predominantly normal sinus rhythm.  Occasional PACs.  Brief atrial tach.  Very frequent PVCs (22% burden).  A-fib ablation with PVI and atrial tachycardia ablation 2025 with Dr. Morrison  Coronary artery dissection  C/NSTEMI, 10/02/2018: Spontaneous circumflex dissection with improvement in flow initially following an attempted wiring of the vessel. With ongoing attempts at advancing the wire however the dissection propagated proximally. With the use of Cardene, nitroglycerin, and Integrilin flow was improved. No evidence of LAD or RCA disease.  Echo, 10/02/2018, 2019, Echo, 10/12/2022:  normal EF with no hemodynamically significant VHD  Echo, 2024: EF 60%. Trace MR. Trace to mild TR with RVSP 24 mmHg.   NSTEMI/C, 2024: Occluded D1 in a small distal segment, possibly related to SCAD given her history. Improved left circumflex flow, healed from prior angiogram in 2018 (related to SCAD). Otherwise minimal disease a diffusely tortuous right dominant system. Normal LVEDP of 12mmHg.  Hypertension  Hyperlipidemia  Snoring  GERD      Allergies   Allergen Reactions   • Erythromycin Diarrhea   • Bisoprolol Other (See Comments)     Ineffective in controlling palps         Current Outpatient Medications:   •  apixaban  (ELIQUIS) 5 MG tablet tablet, Take 1 tablet by mouth 2 (Two) Times a Day., Disp: 60 tablet, Rfl: 6  •  aspirin 81 MG EC tablet, Take 1 tablet by mouth Daily., Disp: , Rfl:   •  calcium carbonate (OS-MOIZ) 600 MG tablet, Take 1 tablet by mouth Daily., Disp: , Rfl:   •  cetirizine (zyrTEC) 10 MG tablet, Take 1 tablet by mouth Daily., Disp: , Rfl:   •  cholecalciferol (VITAMIN D3) 1000 units tablet, Take 1 tablet by mouth Daily., Disp: , Rfl:   •  ibuprofen (ADVIL,MOTRIN) 200 MG tablet, Take 2 tablets by mouth 2 (Two) Times a Day As Needed for Mild Pain (For headaches)., Disp: , Rfl:   •  MAGNESIUM GLYCINATE PO, Take 200 mg by mouth Daily., Disp: , Rfl:   •  Multiple Vitamins-Minerals (MULTIVITAMIN ADULT PO), Take 1 tablet by mouth Daily., Disp: , Rfl:   •  nitroglycerin (NITROSTAT) 0.4 MG SL tablet, 1 under the tongue as needed for angina, may repeat q5mins for up three doses, Disp: 25 tablet, Rfl: 11  •  NON FORMULARY, C pap nightly, Disp: , Rfl:   •  pantoprazole (PROTONIX) 40 MG EC tablet, Take 1 tablet by mouth Daily., Disp: , Rfl:   •  polyethylene glycol (MiraLax) 17 GM/SCOOP powder, Take 17 g by mouth As Needed., Disp: , Rfl:   •  Probiotic Product (PROBIOTIC ADVANCED PO), Take 1 tablet by mouth Daily., Disp: , Rfl:   •  rosuvastatin (CRESTOR) 40 MG tablet, Take 1 tablet by mouth Daily., Disp: 30 tablet, Rfl: 11  •  senna 8.6 MG tablet, Take 1 tablet by mouth Every Other Day., Disp: , Rfl:   •  terazosin (HYTRIN) 2 MG capsule, TAKE ONE CAPSULE BY MOUTH ONCE NIGHTLY, Disp: 90 capsule, Rfl: 3  •  vitamin B-12 (CYANOCOBALAMIN) 1000 MCG tablet, Take 1 tablet by mouth Daily., Disp: , Rfl:      History of Present Illness: Carolyn Chaparro is a 65 y.o. female who is here today for  ***      The following portions of the patient's history were reviewed and updated as appropriate: allergies, current medications and problem list.     Pertinent positives as listed in the HPI.  All other systems reviewed are  "negative.            Vitals:    07/18/25 1108   BP: 116/74   BP Location: Right arm   Patient Position: Sitting   Cuff Size: Adult   Pulse: 64   SpO2: 96%   Weight: 76.9 kg (169 lb 9.6 oz)   Height: 172.7 cm (68\")     Body mass index is 25.79 kg/m².    Physical Exam:  General: Alert and oriented.  Neck: Jugular venous pressure is within normal limits. Carotids have normal upstrokes without bruits.   Cardiovascular: Regular rate and rhythm. No murmur, gallop or rub.  Lungs: Clear, no rales or wheezes. Equal expansion is noted.   Extremities: No peripheral edema  Skin: Warm and dry.  Neurologic: Nonfocal.     Diagnostic data (reviewed with patient):  CMP:   Lab Results   Component Value Date    GLUCOSE 91 06/23/2025    BUN 16.0 06/23/2025    CREATININE 0.63 06/23/2025    EGFRIFNONA 95 02/09/2021    BCR 25.4 (H) 06/23/2025    K 3.9 06/23/2025    CO2 29.0 06/23/2025    CALCIUM 8.8 06/23/2025    ALBUMIN 4.4 04/09/2025    AST 40 (H) 04/09/2025    ALT 38 (H) 04/09/2025       CBC:    Lab Results   Component Value Date    WBC 5.24 06/23/2025    HGB 13.6 06/23/2025    HCT 41.5 06/23/2025    MCV 92.6 06/23/2025     06/23/2025       LIPIDS:    Lab Results   Component Value Date    CHOL 186 01/14/2025    TRIG 64 01/14/2025    HDL 53 01/14/2025     (H) 01/14/2025       HgbA1c:    Lab Results   Component Value Date    HGBA1C 5.20 01/29/2025       Smoking Cessation:   {time:12080}    Procedures     Advance Care Planning   {Advance Directive Status:50915}            Assessment:  No diagnosis found.         Plan:  ***  Continue all other current medications.  F/up in *** months, sooner if needed.      ***  "

## 2025-07-18 NOTE — PROGRESS NOTES
[facility] Cardiology    Date: 2025    Patient ID: Carolyn Chaparro is a 65 y.o. female   : 1960   Contact: [phone]         Chief Complaint: Follow-up coronary artery dissection      Problem List:  Coronary artery dissection:  LHC/NSTEMI, 10/02/2018: Spontaneous circumflex dissection with improvement in flow initially following an attempted wiring of the vessel. With ongoing attempts at advancing the wire however the dissection propagated proximally. With the use of Cardene, nitroglycerin, and Integrilin flow was improved. No evidence of LAD or RCA disease.  Echo, 10/02/2018: EF 60%. Trace Mr and TR.  24h Holter, 2018: occasional PVC's, rare couplets, rare PAC's, brief atrial tachycardia, 13 beats.  Echo, 2019: EF 60%. LV systolic function is normal. No wall motion normalities are identified.  Echo, 10/12/2022: EF 55%. Trace to mild MR/TR. Trace aortic insufficiency. RVSP 7 mmHg.  [facility] admission for Non-STEMI, 2024.  Echo, 2024: EF 60%. Trace MR. Trace to mild TR with RVSP 24 mmHg.   [facility], 2024: Occluded D1 in a small distal segment, possibly related to SCAD given her history. Improved left circumflex flow, healed from prior angiogram in 2018 (related to SCAD). Otherwise minimal disease a diffusely tortuous right dominant system. Normal LVEDP of 12mmHg.  Paroxymal atrial fibrillation  [facility] ED, 2023: Atrial fibrillation with RVR  CHADS-VASc=2 (female, HTN)  OT, 2023: SB/SR. Occasional PACs/PVCs. Brief atach.  Palpitations:  2 week monitor, 2019: Occasional PVCs (1.8%), occasional couplets. Brief atach, longest 14 beats.  1 week Holter, 2024; The predominant rhythm was normal sinus rhythm. Sinus bradycardia and sinus tachycardia noted. Occasional PACs. Brief atrial tachycardia. Very frequent PVCs at 22% of all beats (2 morphologies, but 1 dominant morphology noted). EP follow up to discuss tikosyn vs  ablation, if symptomatic.   Hypertension  Hyperlipidemia  Elevated LFTs/fatty liver  Snoring  GERD      Allergies   Allergen Reactions    Erythromycin Diarrhea    Bisoprolol Other (See Comments)     Ineffective in controlling palps         Current Outpatient Medications:     apixaban (ELIQUIS) 5 MG tablet tablet, Take 1 tablet by mouth 2 (Two) Times a Day., Disp: 60 tablet, Rfl: 6    aspirin 81 MG EC tablet, Take 1 tablet by mouth Daily., Disp: , Rfl:     calcium carbonate (OS-MOIZ) 600 MG tablet, Take 1 tablet by mouth Daily., Disp: , Rfl:     cetirizine (zyrTEC) 10 MG tablet, Take 1 tablet by mouth Daily., Disp: , Rfl:     cholecalciferol (VITAMIN D3) 1000 units tablet, Take 1 tablet by mouth Daily., Disp: , Rfl:     ibuprofen (ADVIL,MOTRIN) 200 MG tablet, Take 2 tablets by mouth 2 (Two) Times a Day As Needed for Mild Pain (For headaches)., Disp: , Rfl:     MAGNESIUM GLYCINATE PO, Take 200 mg by mouth Daily., Disp: , Rfl:     Multiple Vitamins-Minerals (MULTIVITAMIN ADULT PO), Take 1 tablet by mouth Daily., Disp: , Rfl:     nitroglycerin (NITROSTAT) 0.4 MG SL tablet, 1 under the tongue as needed for angina, may repeat q5mins for up three doses, Disp: 25 tablet, Rfl: 11    NON FORMULARY, C pap nightly, Disp: , Rfl:     pantoprazole (PROTONIX) 40 MG EC tablet, Take 1 tablet by mouth Daily., Disp: , Rfl:     polyethylene glycol (MiraLax) 17 GM/SCOOP powder, Take 17 g by mouth As Needed., Disp: , Rfl:     Probiotic Product (PROBIOTIC ADVANCED PO), Take 1 tablet by mouth Daily., Disp: , Rfl:     rosuvastatin (CRESTOR) 40 MG tablet, Take 1 tablet by mouth Daily., Disp: 30 tablet, Rfl: 11    senna 8.6 MG tablet, Take 1 tablet by mouth Every Other Day., Disp: , Rfl:     terazosin (HYTRIN) 2 MG capsule, TAKE ONE CAPSULE BY MOUTH ONCE NIGHTLY, Disp: 90 capsule, Rfl: 3    vitamin B-12 (CYANOCOBALAMIN) 1000 MCG tablet, Take 1 tablet by mouth Daily., Disp: , Rfl:      History of Present Illness: Carolyn Chaparro is a  "65 y.o. female who is here today for 6-month follow-up of spontaneous coronary artery dissection, hypertension and hyperlipidemia.  Since her last appointment with us patient has been following with Dr. Morrison for her frequent PVCs and atrial fibrillation.  She underwent ablation on 6/30.  She remains on Eliquis without interruption and out any bleeding problems.  She only has had a couple episodes of brief palpitations but otherwise is feeling fine.  The last time her cholesterol checked she had been off of her rosuvastatin due to elevated liver enzymes but restarted the medication and has not had a repeat lipid panel checked.  Patient also had a recent ER visit and had a CT scan showing aortic dilatation patient also states that in the future she is looking to possibly have a breast reduction and inquires about her cardiac risk for this.      The following portions of the patient's history were reviewed and updated as appropriate: allergies, current medications and problem list.     Pertinent positives as listed in the HPI.  All other systems reviewed are negative.            Vitals:    07/18/25 1108   BP: 116/74   BP Location: Right arm   Patient Position: Sitting   Cuff Size: Adult   Pulse: 64   SpO2: 96%   Weight: 76.9 kg (169 lb 9.6 oz)   Height: 172.7 cm (68\")     Body mass index is 25.79 kg/m².    Physical Exam:  General: Alert and oriented.  Neck: Jugular venous pressure is within normal limits. Carotids have normal upstrokes without bruits.   Cardiovascular: Regular rate and rhythm. No murmur, gallop or rub.  Lungs: Clear, no rales or wheezes. Equal expansion is noted.   Extremities: Trace bilateral lower extremity edema  Skin: Warm and dry.  Neurologic: Nonfocal.     Diagnostic data (reviewed with patient):  CMP:   Lab Results   Component Value Date    GLUCOSE 91 06/23/2025    BUN 16.0 06/23/2025    CREATININE 0.63 06/23/2025    EGFRIFNONA 95 02/09/2021    BCR 25.4 (H) 06/23/2025    K 3.9 06/23/2025    CO2 " 29.0 06/23/2025    CALCIUM 8.8 06/23/2025    ALBUMIN 4.4 04/09/2025    AST 40 (H) 04/09/2025    ALT 38 (H) 04/09/2025       CBC:    Lab Results   Component Value Date    WBC 5.24 06/23/2025    HGB 13.6 06/23/2025    HCT 41.5 06/23/2025    MCV 92.6 06/23/2025     06/23/2025       LIPIDS:    Lab Results   Component Value Date    CHOL 186 01/14/2025    TRIG 64 01/14/2025    HDL 53 01/14/2025     (H) 01/14/2025       HgbA1c:    Lab Results   Component Value Date    HGBA1C 5.20 01/29/2025       Procedures                 Assessment:  1. Spontaneous dissection of coronary artery    2. Dyslipidemia    3. Essential hypertension             Plan:  Patient has stable cardiac status and is feeling much better after her ablation.  She will continue to follow-up with Dr. Morrison for routine follow-up.  Check a CMP and FLP at the patient's earliest convenience and continue Crestor 40 mg nightly.  Continue terazosin 2 mg nightly for hypertension.  I discussed with the patient that she will need to discuss with Dr. Morrison about anticoagulation in the setting of her wanting to have a breast reduction soon.  I do know the patient needs to remain on anticoagulation for now but I do believe at this time once she follows up with him she will be able to proceed with the breast reduction with a low risk.  Continue all other current medications.  F/up in 12 months, sooner if needed.    Kary Jameson PA-C

## 2025-07-24 ENCOUNTER — OFFICE VISIT (OUTPATIENT)
Dept: CARDIOLOGY | Facility: HOSPITAL | Age: 65
End: 2025-07-24
Payer: COMMERCIAL

## 2025-07-24 VITALS
WEIGHT: 172.8 LBS | BODY MASS INDEX: 26.19 KG/M2 | HEART RATE: 65 BPM | SYSTOLIC BLOOD PRESSURE: 138 MMHG | HEIGHT: 68 IN | OXYGEN SATURATION: 100 % | DIASTOLIC BLOOD PRESSURE: 84 MMHG | RESPIRATION RATE: 20 BRPM

## 2025-07-24 DIAGNOSIS — R79.89 ELEVATED TSH: ICD-10-CM

## 2025-07-24 DIAGNOSIS — I48.0 PAF (PAROXYSMAL ATRIAL FIBRILLATION): Primary | ICD-10-CM

## 2025-07-24 DIAGNOSIS — I49.3 PVC'S (PREMATURE VENTRICULAR CONTRACTIONS): ICD-10-CM

## 2025-07-24 DIAGNOSIS — I10 ESSENTIAL HYPERTENSION: ICD-10-CM

## 2025-07-24 RX ORDER — METOPROLOL TARTRATE 25 MG/1
25 TABLET, FILM COATED ORAL EVERY 12 HOURS PRN
Start: 2025-07-24

## 2025-07-24 NOTE — PROGRESS NOTES
Chief Complaint  Atrial Fibrillation    Subjective      History of Present Illness {  Problem List  Visit  Diagnosis   Encounters  Notes  Medications  Labs  Result Review Imaging  Media :23}     Carolyn Chaparro, 65 y.o. female presents to [facility]rial Fibrillation/arrhythmia clinic for Atrial Fibrillation.    Problem List:  Coronary artery dissection:  C/NSTEMI, 10/02/2018: Spontaneous circumflex dissection with improvement in flow initially following an attempted wiring of the vessel. With ongoing attempts at advancing the wire however the dissection propagated proximally. With the use of Cardene, nitroglycerin, and Integrilin flow was improved. No evidence of LAD or RCA disease.  Echo, 10/02/2018: EF 60%. Trace Mr and TR.  24h Holter, 12/19/2018: occasional PVC's, rare couplets, rare PAC's, brief atrial tachycardia, 13 beats.  Echo, 07/18/2019: EF 60%. LV systolic function is normal. No wall motion normalities are identified.  Echo, 10/12/2022: EF 55%. Trace to mild MR/TR. Trace aortic insufficiency. RVSP 7 mmHg.  [facility] admission for Non-STEMI, 01/20/2024.  Echo, 01/31/2024: EF 60%. Trace MR. Trace to mild TR with RVSP 24 mmHg.   [facility], 01/31/2024: Occluded D1 in a small distal segment, possibly related to SCAD given her history. Improved left circumflex flow, healed from prior angiogram in 2018 (related to SCAD). Otherwise minimal disease a diffusely tortuous right dominant system. Normal LVEDP of 12mmHg.  Paroxymal atrial fibrillation  [facility] ED, 05/14/2023: Atrial fibrillation with RVR  CHADS-VASc=2 (female, HTN)  OT, 08/08/2023: SB/SR. Occasional PACs/PVCs. Brief atach.  Pulmonary vein isolation, atrial tachycardia ablation at base of JOHN by Dr. Morrison on 6/30/2025.   Palpitations:  2 week monitor, 09/20/2019: Occasional PVCs (1.8%), occasional couplets. Brief atach, longest 14 beats.  1 week Holter, 08/14/2024; The predominant rhythm was normal sinus rhythm. Sinus  "bradycardia and sinus tachycardia noted. Occasional PACs. Brief atrial tachycardia. Very frequent PVCs at 22% of all beats (2 morphologies, but 1 dominant morphology noted). EP follow up to discuss tikosyn vs ablation, if symptomatic.   Hypertension  Hyperlipidemia  Elevated LFTs/fatty liver  Snoring  GERD    HPI:  Patient recently underwent pulmonary vein isolation, atrial tachycardia ablation at base of JOHN by Dr. Morrison on 6/30/2025. Patient was instructed to continue uninterrupted anticoagulation.      Patient presents today doing well since their procedure. States no recurrence of atrial fibrillation symptoms.  Intermittent isolated palpitation consistent with her PVCs continues intermittently. Denies access site complications, or signs/symptoms of bleeding. She has continued uninterrupted anticoagulation as instructed with no bleeding complications; broken blood vessel in right eye and evaluated by optometry yesterday with discussion that this should self resolve.    Risk factor screening:  Thyroid disorder: Elevated TSH April 2025  Sleep apnea: CPAP-compliant with therapy  HTN: SBP well controlled in 120s  Weight: BMI 26.2  Alcohol: None      Objective     Vital Signs:   Vitals:    07/24/25 1209 07/24/25 1210 07/24/25 1248   BP: 161/74 161/74 138/84   BP Location: Left arm Left arm    Patient Position: Sitting Standing    Cuff Size: Adult Adult    Pulse: 61 65    Resp: 20     SpO2: 100% 100%    Weight: 78.4 kg (172 lb 12.8 oz)     Height: 172.7 cm (68\")       Body mass index is 26.27 kg/m².  Physical Exam  Vitals and nursing note reviewed.   Constitutional:       Appearance: Normal appearance.   HENT:      Head: Normocephalic.   Eyes:      Extraocular Movements: Extraocular movements intact.   Neck:      Vascular: No carotid bruit.   Cardiovascular:      Rate and Rhythm: Normal rate and regular rhythm.      Pulses: Normal pulses.      Heart sounds: Normal heart sounds, S1 normal and S2 normal. No murmur " heard.  Pulmonary:      Effort: Pulmonary effort is normal. No respiratory distress.      Breath sounds: Normal breath sounds.   Musculoskeletal:      Cervical back: Neck supple.      Right lower leg: No edema.      Left lower leg: No edema.   Skin:     General: Skin is warm and dry.   Neurological:      General: No focal deficit present.      Mental Status: She is alert.   Psychiatric:         Mood and Affect: Mood normal.         Behavior: Behavior normal.         Thought Content: Thought content normal.        Data Reviewed:{ Labs  Result Review  Imaging  Med Tab  Media :23}     EP/CRM Study (06/30/2025 12:14)  ECG 12 Lead Rhythm Change (06/30/2025 15:41)  Holter Monitor - 72 Hour Up To 15 Days (08/14/2024 09:18)   Adult Transthoracic Echo Complete W/ Cont if Necessary Per Protocol (01/31/2024 11:42)     Lab Results   Component Value Date    GLUCOSE 91 06/23/2025    CALCIUM 8.8 06/23/2025     06/23/2025    K 3.9 06/23/2025    CO2 29.0 06/23/2025     06/23/2025    BUN 16.0 06/23/2025    CREATININE 0.63 06/23/2025    EGFR 99.8 06/23/2025    BCR 25.4 (H) 06/23/2025    ANIONGAP 9.0 06/23/2025     Lab Results   Component Value Date    TSH 5.460 (H) 04/09/2025     Lab Results   Component Value Date    WBC 5.24 06/23/2025    HGB 13.6 06/23/2025    HCT 41.5 06/23/2025    MCV 92.6 06/23/2025     06/23/2025       Assessment & Plan   Assessment and Plan {CC Problem List  Visit Diagnosis  ROS  Review (Popup)  Health Maintenance  Quality  BestPractice  Medications  SmartSets  SnapShot Encounters  Media :23}     1. PAF (paroxysmal atrial fibrillation)  -s/p pulmonary vein isolation, atrial tachycardia ablation at base of JOHN by Dr. Morrison on 6/30/2025.   - Denies recurrent AF arrhythmia symptoms since recent procedure  - Regular rate/rhythm at time of exam today.  -[id number]: 2 (HTN, female)  -Continue anticoagulation with apixaban 5 Mg every 12 hours   -Discussed modifiable risk factors  including: Continue CPAP compliance, discussed follow-up with PCP for elevated TSH on lab work in April  -AF education provided in clinic today.  -Continue routine EP follow-up as scheduled     2. PVC's (premature ventricular contractions)  -History of symptomatic PVCs.  -Holter monitor August 2024; PVCs at 22%  -Reports continued intermittent PVCs.  Beta-blockers were discontinued after recent atrial fibrillation ablation.  -Regular rate and rhythm at time of exam, no premature contractions during physical exam today.  -Discussed different medication options on scheduled beta-blocker versus as needed.  She would prefer to restart metoprolol on an as-needed basis if she has worsened symptoms.  - metoprolol tartrate every 12 (Twelve) Hours As Needed (Palpitations/PVC).  - Continue routine EP follow-up as above    3. Elevated TSH  -TSH 5.4, T4 normal during lab work April 2025  - Discussed follow-up with PCP for further evaluation     4. Essential hypertension  -Well-controlled on home monitoring, reasonable control on recheck in clinic today.  - Continue routine home BP monitoring      Follow Up {Instructions Charge Capture  Follow-up Communications :23}     Return if symptoms worsen or fail to improve.    Time Spent: I spent 42 minutes caring for Carolyn Chaparro on this date of service. This time includes time spent by me in the following activities: preparing for the visit, reviewing tests, obtaining and/or reviewing a separately obtained history, performing a medically appropriate examination and/or evaluation, counseling and educating the patient/family/caregiver, documenting information in the medical record and independently interpreting results and communicating that information with the patient/family/caregiver. All time noted occurred on the date of service.    Patient was given instructions and counseling regarding her condition or for health maintenance advice. Please see specific information  pulled into the AVS if appropriate. Patient was instructed to call the [facility] with any questions, concerns, or worsening symptoms.    Dictated Utilizing Dragon Dictation   Please note that portions of this note were completed with a voice recognition program. Part of this note may be an electronic transcription/translation of spoken language to printed text using the Dragon Dictation System.

## 2025-07-24 NOTE — PATIENT INSTRUCTIONS
- Metoprolol every 12 hours as needed for palpitation    - Contact PCP for TSH recheck and further recommendation

## 2025-07-25 ENCOUNTER — LAB (OUTPATIENT)
Dept: LAB | Facility: HOSPITAL | Age: 65
End: 2025-07-25
Payer: COMMERCIAL

## 2025-07-25 DIAGNOSIS — E78.5 DYSLIPIDEMIA: ICD-10-CM

## 2025-07-25 LAB
ALBUMIN SERPL-MCNC: 4.4 G/DL (ref 3.5–5.2)
ALBUMIN/GLOB SERPL: 1.6 G/DL
ALP SERPL-CCNC: 79 U/L (ref 39–117)
ALT SERPL W P-5'-P-CCNC: 28 U/L (ref 1–33)
ANION GAP SERPL CALCULATED.3IONS-SCNC: 10.9 MMOL/L (ref 5–15)
AST SERPL-CCNC: 29 U/L (ref 1–32)
BILIRUB SERPL-MCNC: 0.4 MG/DL (ref 0–1.2)
BUN SERPL-MCNC: 12 MG/DL (ref 8–23)
BUN/CREAT SERPL: 16.2 (ref 7–25)
CALCIUM SPEC-SCNC: 9.6 MG/DL (ref 8.6–10.5)
CHLORIDE SERPL-SCNC: 104 MMOL/L (ref 98–107)
CHOLEST SERPL-MCNC: 125 MG/DL (ref 0–200)
CO2 SERPL-SCNC: 27.1 MMOL/L (ref 22–29)
CREAT SERPL-MCNC: 0.74 MG/DL (ref 0.57–1)
EGFRCR SERPLBLD CKD-EPI 2021: 91 ML/MIN/1.73
GLOBULIN UR ELPH-MCNC: 2.8 GM/DL
GLUCOSE SERPL-MCNC: 104 MG/DL (ref 65–99)
HDLC SERPL-MCNC: 52 MG/DL (ref 40–60)
LDLC SERPL CALC-MCNC: 59 MG/DL (ref 0–100)
LDLC/HDLC SERPL: 1.14 {RATIO}
POTASSIUM SERPL-SCNC: 4.3 MMOL/L (ref 3.5–5.2)
PROT SERPL-MCNC: 7.2 G/DL (ref 6–8.5)
SODIUM SERPL-SCNC: 142 MMOL/L (ref 136–145)
TRIGL SERPL-MCNC: 69 MG/DL (ref 0–150)
VLDLC SERPL-MCNC: 14 MG/DL (ref 5–40)

## 2025-07-25 PROCEDURE — 80053 COMPREHEN METABOLIC PANEL: CPT

## 2025-07-25 PROCEDURE — 36415 COLL VENOUS BLD VENIPUNCTURE: CPT

## 2025-07-25 PROCEDURE — 80061 LIPID PANEL: CPT

## 2025-08-18 RX ORDER — TERAZOSIN 2 MG/1
2 CAPSULE ORAL NIGHTLY
Qty: 90 CAPSULE | Refills: 3 | Status: SHIPPED | OUTPATIENT
Start: 2025-08-18

## (undated) DEVICE — KT POSTN TRENDELENBURG NBXL PAD WING STRAP TABL HDRST XLG

## (undated) DEVICE — PULSED FIELD ABLATION CATHETER: Brand: FARAWAVE™

## (undated) DEVICE — DRAPE,TOP,102X53,STERILE: Brand: MEDLINE

## (undated) DEVICE — SUT VIC 2/0 CT2 27IN J269H

## (undated) DEVICE — DECANT BG O JET

## (undated) DEVICE — PK CATH CARD 10

## (undated) DEVICE — SOL NACL 0.9PCT 1000ML

## (undated) DEVICE — INTRO SHEATH PRELUDE IDEAL SPRNG COIL .021 6F 16X80CM

## (undated) DEVICE — ST EXT IV SMRTSTE 2VLV FIX M LL 6ML 41

## (undated) DEVICE — GLIDESHEATH SLENDER STAINLESS STEEL KIT: Brand: GLIDESHEATH SLENDER

## (undated) DEVICE — GW LUGE .014 182 CM

## (undated) DEVICE — CVR HNDL LIGHT RIGID

## (undated) DEVICE — CATH DIAG EXPO M/ PK 6FR FL4/FR4 PIG 3PK

## (undated) DEVICE — ADHS SKIN PREMIERPRO EXOFIN TOPICAL HI/VISC .5ML

## (undated) DEVICE — CATH DIAG EXPO .056 FL3.5 6F 100CM

## (undated) DEVICE — ARM DRAPE

## (undated) DEVICE — GUIDE CATHETER: Brand: MACH1™

## (undated) DEVICE — PINNACLE INTRODUCER SHEATH: Brand: PINNACLE

## (undated) DEVICE — BLADELESS OBTURATOR: Brand: WECK VISTA

## (undated) DEVICE — ST INF PRI SMRTSTE 20DRP 2VLV 24ML 117

## (undated) DEVICE — CATH ULTRASND ECHO ACUNAV FOR ACUSON 8F 90CM

## (undated) DEVICE — Device: Brand: MEDEX

## (undated) DEVICE — PERCLOSE™ PROSTYLE™ SUTURE-MEDIATED CLOSURE AND REPAIR SYSTEM: Brand: PERCLOSE™ PROSTYLE™

## (undated) DEVICE — ELECTRD RETRN/GRND MEGADYNE SGL/PLT W/CORD 9FT DISP

## (undated) DEVICE — TR BAND RADIAL ARTERY COMPRESSION DEVICE: Brand: TR BAND

## (undated) DEVICE — ST EXT IV SMARTSITE 2VLV SP M LL 5ML IV1

## (undated) DEVICE — HI-TORQUE PILOT 150 GUIDE WIRE .014 STRAIGHT TIP 3.0 CM X 190 CM: Brand: HI-TORQUE PILOT

## (undated) DEVICE — GW TPR/CORE CERBRL HEP HN .035 15X260

## (undated) DEVICE — ADULT, W/LG. BACK PAD, RADIOTRANSPARENT ELEMENT AND LEAD WIRE COMPATIBLE W/: Brand: DEFIBRILLATION ELECTRODES

## (undated) DEVICE — STERILE (15.2 TAPERED TO 7.6 X 183CM) POLYETHYLENE ACCORDION-FOLDED COVER FOR USE WITH SIEMENS ACUNAV ULTRASOUND CATHETER FAMILY CONNECTOR: Brand: SWIFTLINK TRANSDUCER COVER

## (undated) DEVICE — ST EXT IV SMARTSITE PINCH/CLMP 5ML 46CM

## (undated) DEVICE — KT ELECTRD EP SURF ENSITE/X ADHS/PTCH 1P/U NS

## (undated) DEVICE — RADIFOCUS GLIDEWIRE: Brand: GLIDEWIRE

## (undated) DEVICE — SET PRIMARY GRVTY 10DP MALE LL 104IN

## (undated) DEVICE — COLUMN DRAPE

## (undated) DEVICE — SUT GORE THX26 CV2 36IN 2N05A

## (undated) DEVICE — DRP ADAPT ALLY UTER POSTN SYS 1P/U

## (undated) DEVICE — GW PERIPH GUIDERIGHT STD/EXCHNG/J/TIP SS 0.035IN 5X260CM

## (undated) DEVICE — CATH MAP CARD ADVISOR/HD GRIDX SENSOR/ENABLED STEER FLX STRL

## (undated) DEVICE — PK MAJ GYN DAVINCI 10

## (undated) DEVICE — DEV COMP RAD PRELUDESYNC 24CM

## (undated) DEVICE — MODEL AT P65, P/N 701554-001KIT CONTENTS: HAND CONTROLLER, 3-WAY HIGH-PRESSURE STOPCOCK WITH ROTATING END AND PREMIUM HIGH-PRESSURE TUBING: Brand: ANGIOTOUCH® KIT

## (undated) DEVICE — ACCESS SOLUTION: Brand: VERSACROSS™ ACCESS SOLUTION

## (undated) DEVICE — SUT MNCRYL PLS ANTIB UD 3/0 PS2 27IN

## (undated) DEVICE — Device: Brand: WEBSTER CS

## (undated) DEVICE — OCCL COLPO PNEUMO  STRL

## (undated) DEVICE — KT MANIFOLD CATHLAB CUST

## (undated) DEVICE — TIP COVER ACCESSORY

## (undated) DEVICE — GLV SURG DERMASSURE GRN LF PF 7.0

## (undated) DEVICE — ANTIBACTERIAL UNDYED BRAIDED (POLYGLACTIN 910), SYNTHETIC ABSORBABLE SUTURE: Brand: COATED VICRYL

## (undated) DEVICE — DEV INFL MONARCH 25W

## (undated) DEVICE — ST TBG CONN PNEUMOCLEAR EVAC SMOKE HEAT/HUMID

## (undated) DEVICE — CANNULA SEAL

## (undated) DEVICE — MODEL BT2000 P/N 700287-012KIT CONTENTS: MANIFOLD WITH SALINE AND CONTRAST PORTS, SALINE TUBING WITH SPIKE AND HAND SYRINGE, TRANSDUCER: Brand: BT2000 AUTOMATED MANIFOLD KIT

## (undated) DEVICE — INTENDED FOR TISSUE SEPARATION, AND OTHER PROCEDURES THAT REQUIRE A SHARP SURGICAL BLADE TO PUNCTURE OR CUT.: Brand: BARD-PARKER ® STAINLESS STEEL BLADES

## (undated) DEVICE — CATHETER CONNECTION CABLE: Brand: FARASTAR™

## (undated) DEVICE — HI-TORQUE WHISPER MS GUIDE WIRE .014 STRAIGHT TIP 3.0 CM X 190 CM: Brand: HI-TORQUE WHISPER

## (undated) DEVICE — INTRO STEER AGILIS/NXT DUAL/REACH 13F 18.4MM 71CM 0.038IN SM

## (undated) DEVICE — CATH DIAG EXPO M/ PK 5F FL4/FR4 PIG

## (undated) DEVICE — CATH DIAG EXPO .045 FL3.5 5F 100CM

## (undated) DEVICE — LEX ELECTRO PHYSIOLOGY: Brand: MEDLINE INDUSTRIES, INC.

## (undated) DEVICE — APPL CHLORAPREP TINTED 26ML TEAL

## (undated) DEVICE — ACUMEN IQ CUFF ADULT: Brand: ACUMEN IQ CUFF ADULT

## (undated) DEVICE — CYSTO/BLADDER IRRIGATION SET, REGULATING CLAMP